# Patient Record
Sex: FEMALE | Race: WHITE | NOT HISPANIC OR LATINO | Employment: FULL TIME | URBAN - METROPOLITAN AREA
[De-identification: names, ages, dates, MRNs, and addresses within clinical notes are randomized per-mention and may not be internally consistent; named-entity substitution may affect disease eponyms.]

---

## 2018-01-05 ENCOUNTER — ALLSCRIPTS OFFICE VISIT (OUTPATIENT)
Dept: OTHER | Facility: OTHER | Age: 38
End: 2018-01-05

## 2018-01-05 DIAGNOSIS — E03.9 HYPOTHYROIDISM: ICD-10-CM

## 2018-01-06 NOTE — CONSULTS
Assessment   1  Thyromegaly (240 9) (E01 0)   2  Hypothyroidism (244 9) (E03 9)   3  Weight gain (783 1) (R63 5)   4  Prediabetes (790 29) (R73 03)    Plan   Hypothyroidism    · (1) IGF-1; Status:Active; Requested CWS:[id number]; Perform:[facility]; OBU:[id number];PZTOEHN;JXV:GXJYMYFZYOVAFV; Ordered By:Marin Montiel;   · (1) SALIVARY CORTISOL, THREE SPECIMENS; Status:Active; Requested CVE:[id number]; Perform:[facility]; TTN:[id number];ONSAMZB;YFT:WCKQUYPQNKEBYO; Ordered By:Guillaume Montiel;   · (1) T4, FREE; Status:Active; Requested RCR:[id number]; Perform:[facility]; [id number];WYTGVCU;ZFG:LQCDGIVFDRQWPL; Ordered By:Marin Montiel;   · (1) TSH; Status:Active; Requested HKQ:[id number]; Perform:[facility]; JCQ:80GFK0574;DYMBFQO;RMO:TFYZPHZJDISWHH; Ordered By:Marin Montiel;   · [facility]) Thyroid Peroxidase (TPO) Ab; Status:Active; Requested SSL:[id number]; Perform:[facility]; NDS:49ECW5181;XVHXRRO;ZAHIRA:THMCEIKBVNNZDS; Ordered By:Marin Montiel;   · US THYROID; Status:Hold For - Scheduling; Requested UYH:[id number]; Perform:[facility]; BGS:[id number];ZMDZFIK;GIV:KXMMHIDMGIMSRJ; Ordered By:Marin Montiel;   · Follow-up visit in 6 months Evaluation and Treatment  Follow-up  Status: Hold For -    Scheduling  Requested for: [id number]   Ordered; For: Hypothyroidism; Ordered By: Chuck Gerardo Performed:  Due: 50PYG4565    Discussion/Summary   Discussion Summary:    1  Thyromegaly: Symptomatic early he does not describe significant depressive symptoms  There were very small thyroid nodules described on her ultrasound report  The multiple tiny nodules bilaterally could be related to her history of Hashimoto's  Will check another ultrasound in  of this year  Hypothyroidism secondary to Hashimoto's: Check TSH free T4 and TPO antibodies today  Will adjust if needed  Patient inquired into brand thyroid hormone and if she wishes she can try Synthroid  Weight gain and history of prediabetes: Screen for Cushing's and acromegaly  If her thyroid tests of appear appropriate and her screens for Cushing's in acromegaly are negative, in the setting of prediabetes can consider medical treatment for weight loss such as saxenda  Also discussed possibility of meeting with the nutritionist versus the medical nutrition therapy weight loss program    in 6 months  Counseling Documentation With Imm: The patient was counseled regarding diagnostic results,-- impressions,-- importance of compliance with treatment  Medication SE Review and Pt Understands Tx: The treatment plan was reviewed with the patient/guardian  The patient/guardian understands and agrees with the treatment plan      Chief Complaint   Chief Complaint Free Text Note Form: consult      History of Present Illness   HPI: 42-year-old female who was found to have thyromegaly and Hashimoto's in January 2016 presents to establish care  She reports thyromegaly and thyroid dysfunction was found after pregnancy in the setting of uncontrolled weight gain  She was started on levothyroxine 50 mcg daily and takes this appropriately  She had a repeat ultrasound done in June of 2017 showed an increase in size of her thyroid as well as some small nodules  Overall, she notes she feels okay  She does report fatigue  One of her main concerns is difficult to control weight gain as well as difficulty with weight loss  She also has a recent diagnosis of prediabetes with an A1c of 5 7  She denies family history of thyroid cancer, but does note her mother had an overactive thyroid had her thyroid removed  She denies dysphagia, odynophagia, hoarseness at baseline  Sometimes when she is lying on her back she does report feeling some compressive symptoms        Review of Systems   Endo Adult ROS Female New Patient:      Constitutional/General: no change in ring size,-- change in shoe size,-- chills,-- dizziness,-- no fainting,-- fatigue,-- no fever,-- forgetfulness,-- headache,-- no loss of sleep,-- no recent weight loss,-- nervousness,-- no numbness,-- no temperature intolerance,-- no excessive sweating-- and-- weight gain  Muscle/Joint/Bone: back pain-- and-- hand weakness, but-- no arm pain,-- no hip pain,-- no leg pain,-- no foot pain,-- no neck pain,-- no hand pain,-- no shoulder pain,-- no arm weakness,-- no back weakness,-- no hip weakness,-- no leg weakness,-- no foot weakness,-- no neck weakness,-- no shoulder weakness,-- no arm numbness,-- no back numbness,-- no hip numbness,-- no leg numbness,-- no foot numbness,-- no neck numbness,-- no hand numbness-- and-- no shoulder numbness  Gastrointestinal: no constipation,-- no diarrhea,-- no excessive hunger,-- no excessive thirst,-- no nausea,-- no poor appetite,-- no rectal bleeding,-- no stomach pain,-- no vomiting-- and-- no vomiting blood  Cardiovascular: no chest pain,-- no hypertension,-- no irregular heart beat,-- no hypotension,-- no poor circulation,-- no rapid heart beat-- and-- no ankle swelling  Eye/Ear/Nose/Throat: bleeding gums, but-- no blurred vision,-- no difficulty swallowing,-- no double vision,-- no gritty eyes,-- no hoarseness,-- no hearing loss,-- no persistent cough,-- no sinus problems,-- not seeing flashes-- and-- not seeing halos  Skin: bruising easily-- and-- itching, but-- no hives,-- no change in a mole,-- no rashes,-- no scar-- and-- no slow healing sores  Genitourinary no blood in urine,-- no frequent urination,-- no night time urination-- and-- no painful urination  Genitourinary - Reproductive 1 Children, but-- normal period,-- no bleeding between periods,-- no breast lump,-- no hot flashes,-- no nipple discharge,-- no vaginal discharge-- and-- not pregnant  date of last menstruation is unknown  the interval of the LMP is unknown periods usually lasts an unspecified duration             LASHELL Reviewed:    LASHELL reviewed  Past Medical History   Active Problems And Past Medical History Reviewed: The active problems and past medical history were reviewed and updated today  Surgical History   Surgical History Reviewed: The surgical history was reviewed and updated today  Family History   Mother    1  Family history of malignant neoplasm (V16 9) (Z80 9)  Grandmother    2  Family history of malignant neoplasm (V16 9) (Z80 9)  Grandfather    3  Family history of malignant neoplasm (V16 9) (Z80 9)  Aunt    4  Family history of diabetes mellitus (V18 0) (Z83 3)  Uncle    5  Family history of diabetes mellitus (V18 0) (Z83 3)   6  Family history of hypertension (V17 49) (Z82 49)  Family History Reviewed: The family history was reviewed and updated today  Social History    · Current smoker on some days (305 1) (F17 200)  Social History Reviewed: The social history was reviewed and updated today  Current Meds    1  Levothyroxine Sodium 50 MCG Oral Tablet; Therapy: (Recorded:05Jan2018) to Recorded   2  Mirena IUD; Therapy: (Recorded:05Jan2018) to Recorded   3  Omeprazole 20 MG Oral Tablet Delayed Release; Therapy: (Recorded:05Jan2018) to Recorded   4  Sertraline HCl - 25 MG Oral Tablet; Therapy: (Recorded:05Jan2018) to Recorded  Medication List Reviewed: The medication list was reviewed and updated today  Allergies   1  No Known Drug Allergies    Vitals   Vital Signs    Recorded: 01YLB8284 08:08AM   Heart Rate 64   Systolic 986   Diastolic 74   Height 5 ft 1 42 in   Weight 285 lb 2 oz   BMI Calculated 53 14   BSA Calculated 2 21     Physical Exam        Constitutional      General appearance: No acute distress, well appearing and well nourished  Eyes      Conjunctiva and lids: No swelling, erythema, or discharge  Pupils: Equal, round and reactive to light  The sclera are anicteric  Extraocular movements are intact         Ears, Nose, Mouth, and Throat      Neck: The neck is supple  The thyroid is normal in size with no palpable nodules  Pulmonary      Respiratory effort: No increased work of breathing or signs of respiratory distress  Auscultation of lungs: Clear to auscultation bilaterally with normal chest expansion  Cardiovascular      Auscultation of heart: Normal rate and rhythm with no murmurs, gallops or rubs  Examination of extremities for edema and/or varicosities: Normal        Abdomen      Abdomen: Abdomen is soft, non-tender with normal bowel sounds  Lymphatic      Palpation of lymph nodes: No supraclavicular or suboccipital lymphadenopathy  Skin      Skin and subcutaneous tissue: Normal skin temperature and color  Psychiatric      Orientation to person, place and time: Normal        Mood and affect: Affect and attention span are normal        Results/Data   Office Record Review: Recent labs show an A1c of 5 7 and normal CMP  CBC normal, CMP normal, free T4 1 12, TSH 1 78    thyroid ultrasound: Thyroid gland is markedly nodular in appearance and inhomogeneous in echogenicity  Gland is markedly enlarged measuring 7 4 x 2 x 1 9 cm on the right knee 8 x 1 8 x 1 8 cm on the left  No focal masses could be measure although there are multiple tiny nodules bilaterally        Future Appointments      Date/Time Provider Specialty Site   07/05/2018 08:15 AM Zander Patino, [address] [facility] 939     Signatures    Electronically signed by : RAMONA Dhillon ; Jan 5 2018  9:07AM EST                       (Author)

## 2018-01-11 ENCOUNTER — GENERIC CONVERSION - ENCOUNTER (OUTPATIENT)
Dept: OTHER | Facility: OTHER | Age: 38
End: 2018-01-11

## 2018-01-12 ENCOUNTER — GENERIC CONVERSION - ENCOUNTER (OUTPATIENT)
Dept: OTHER | Facility: OTHER | Age: 38
End: 2018-01-12

## 2018-01-12 LAB
INSULIN-LIKE GROWTH FACTOR-1 (HISTORICAL): 103 NG/ML (ref 69–227)
T4 FREE SERPL-MCNC: 1.03 NG/DL (ref 0.82–1.77)
THYROID MICROSOMAL ANTIBODY (HISTORICAL): 111 IU/ML (ref 0–34)
TSH SERPL DL<=0.05 MIU/L-ACNC: 2.26 UIU/ML (ref 0.45–4.5)

## 2018-01-15 ENCOUNTER — GENERIC CONVERSION - ENCOUNTER (OUTPATIENT)
Dept: OTHER | Facility: OTHER | Age: 38
End: 2018-01-15

## 2018-01-20 LAB
Lab: 0.02 UG/DL
Lab: 0.03 UG/DL
Lab: 0.04 UG/DL

## 2018-01-22 ENCOUNTER — GENERIC CONVERSION - ENCOUNTER (OUTPATIENT)
Dept: OTHER | Facility: OTHER | Age: 38
End: 2018-01-22

## 2018-01-22 VITALS
SYSTOLIC BLOOD PRESSURE: 114 MMHG | DIASTOLIC BLOOD PRESSURE: 74 MMHG | HEART RATE: 64 BPM | WEIGHT: 285.13 LBS | HEIGHT: 61 IN | BODY MASS INDEX: 53.83 KG/M2

## 2018-01-23 ENCOUNTER — GENERIC CONVERSION - ENCOUNTER (OUTPATIENT)
Dept: OTHER | Facility: OTHER | Age: 38
End: 2018-01-23

## 2018-01-23 LAB
Lab: NORMAL
Lab: NORMAL
Lab: NORMAL UG/DL
QUESTION/PROBLEM (HISTORICAL): NORMAL

## 2018-01-23 NOTE — RESULT NOTES
Verified Results  (1) IGF-1 [id number] 12:11PM Candido Santiago     Test Name Result Flag Reference   Insulin-Like Growth Factor I 103 ng/mL

## 2018-01-24 NOTE — RESULT NOTES
Verified Results  (LC) Salivary Cortisol X3, Timed [id number] 12:00AM [facility]als     Test Name Result Flag Reference   #3 Salivary Cortisol TNP     Test not performed   #2 Salivary Cortisol TNP     Test not performed   #1 Salivary Cortisol NFSR ug/dL     No frozen specimen received  [facility]) Request Problem [id number] 12:00AM [employer] [facility]     Test Name Result Flag Reference   Request Problem NFSR     No frozen specimen received          TEST:  [id number]  Salivary Cortisol X3, Timed

## 2018-01-24 NOTE — RESULT NOTES
Verified Results  (LC) Salivary Cortisol X3, Timed [id number] 10:00PM Mika Waddell     Test Name Result Flag Reference   #3 Salivary Cortisol 0 034 ug/dL     Draw date/time: 01/14/18 - 10:00   #2 Salivary Cortisol 0 040 ug/dL     Draw date/time: 01/13/18 - 10:00   #1 Salivary Cortisol 0 016 ug/dL     Draw date/time: 01/12/18 - 10:00  Reference Range:  Children and Adults:  8:00a  m :   0 025 - 0 600  Noon:      <0 010 - 0 330  4:00p m :   0 010 - 0 200  Midnight:  <0 010 - 0 090

## 2018-02-26 NOTE — RESULT NOTES
Verified Results  (1) TSH [id number] 12:11PM [facility]     Test Name Result Flag Reference   TSH 2 260 uIU/mL  0 450-4 500     [facility]) Thyroxine (T4) Free, Direct, S [id number] 12:11PM [facility]     Test Name Result Flag Reference   T4,Free(Direct) 1 03 ng/dL  0 82-1 77     (LC) Thyroid Peroxidase (TPO) Ab [id number] 12:09PM [facility]     Test Name Result Flag Reference   Thyroid Peroxidase (TPO) Ab 111 IU/mL H 0-34

## 2018-03-05 DIAGNOSIS — E03.9 HYPOTHYROIDISM: ICD-10-CM

## 2018-05-18 LAB
T4 FREE SERPL-MCNC: 1.02 NG/DL (ref 0.82–1.77)
TSH SERPL DL<=0.005 MIU/L-ACNC: 2.23 UIU/ML (ref 0.45–4.5)

## 2018-06-01 DIAGNOSIS — E03.9 HYPOTHYROIDISM: ICD-10-CM

## 2018-06-29 LAB — HBA1C MFR BLD HPLC: 5.8 %

## 2018-07-02 RX ORDER — LEVOTHYROXINE SODIUM 0.05 MG/1
TABLET ORAL
Refills: 5 | COMMUNITY
Start: 2018-05-23 | End: 2018-07-05 | Stop reason: SDUPTHER

## 2018-07-02 RX ORDER — OMEPRAZOLE 20 MG/1
20 CAPSULE, DELAYED RELEASE ORAL DAILY
Refills: 3 | COMMUNITY
Start: 2018-04-25 | End: 2022-04-18

## 2018-07-02 RX ORDER — SERTRALINE HYDROCHLORIDE 25 MG/1
25 TABLET, FILM COATED ORAL DAILY
Refills: 1 | COMMUNITY
Start: 2018-05-23

## 2018-07-05 ENCOUNTER — OFFICE VISIT (OUTPATIENT)
Dept: ENDOCRINOLOGY | Facility: HOSPITAL | Age: 38
End: 2018-07-05
Payer: COMMERCIAL

## 2018-07-05 VITALS
DIASTOLIC BLOOD PRESSURE: 68 MMHG | HEIGHT: 61 IN | BODY MASS INDEX: 55.32 KG/M2 | SYSTOLIC BLOOD PRESSURE: 126 MMHG | HEART RATE: 68 BPM | WEIGHT: 293 LBS

## 2018-07-05 DIAGNOSIS — R73.03 PRE-DIABETES: ICD-10-CM

## 2018-07-05 DIAGNOSIS — IMO0001 CLASS 3 OBESITY WITHOUT SERIOUS COMORBIDITY WITH BODY MASS INDEX (BMI) OF 50.0 TO 59.9 IN ADULT, UNSPECIFIED OBESITY TYPE: ICD-10-CM

## 2018-07-05 DIAGNOSIS — E06.3 HYPOTHYROIDISM DUE TO HASHIMOTO'S THYROIDITIS: Primary | ICD-10-CM

## 2018-07-05 DIAGNOSIS — E03.8 HYPOTHYROIDISM DUE TO HASHIMOTO'S THYROIDITIS: Primary | ICD-10-CM

## 2018-07-05 PROCEDURE — 99214 OFFICE O/P EST MOD 30 MIN: CPT | Performed by: NURSE PRACTITIONER

## 2018-07-05 RX ORDER — LEVOTHYROXINE SODIUM 0.05 MG/1
TABLET ORAL
Qty: 34 TABLET | Refills: 6 | Status: SHIPPED | OUTPATIENT
Start: 2018-07-05 | End: 2019-01-09 | Stop reason: SDUPTHER

## 2018-07-05 NOTE — PATIENT INSTRUCTIONS
Be mindful of diet  Exercise regularly  Stay hydrated  Continue levothyroxine at current dose  Consider follow up with Nutritionist and HNT program     Can discuss medications such as Qsymia, Contrave, Belviq and Saxenda

## 2018-07-05 NOTE — PROGRESS NOTES
Jamesonrandal Co 43 y o  female MRN: [id number]    Encounter: [id number]      Assessment/Plan     Assessment: This is a 43y o -year-old female with hypothyroidism, thyromegaly and weight gain/prediabetes  Plan:  1  Thyromegaly:  She denies any neck compressive symptoms at this time  Her most recent ultrasound of the thyroid shows a heterogeneous thyroid with no discrete nodules      2  Hypothyroidism secondary to Hashimoto's:  Her most recent TSH and free T4 were normal  Overall, she feels well  Continue levothyroxine at current dose of 1 tablet Monday through Saturday and 2 tablets on Sunday  Check thyroid function lab work prior to next visit      3  Weight gain and history of prediabetes:  Discussed the importance of diet and regular exercise  She will follow up with medical nutrition therapy and discuss options with the director of the HNT program and of our [facility] office  Briefly reviewed Qsymia, Belviq, Contrave and Saxenda as possible pharmacological therapies  CC:  Hypothyroidism follow-up    History of Present Illness     HPI:  26-year-old female who was found to have thyromegaly and Hashimoto's in January 2016 presents to establish care  She reports thyromegaly and thyroid dysfunction was found after pregnancy in the setting of uncontrolled weight gain  She takes levothyroxine 50 mcg daily Monday through Saturday with 2 tablets on Sunday consistently and appropriately  Her most recent TSH from May 17, 2018 was 2 230 with a free T4 of 1 02  Her most recent thyroid ultrasound from June 5, 2018 shows a grossly stable thyroid heterogeneous in nature with no discrete nodules  Overall, she notes she feels okay  She does report fatigue  One of her main concerns is difficult to control weight gain as well as difficulty with weight loss  She also has a recent diagnosis of prediabetes with an A1c of 5 7   She denies family history of thyroid cancer, but does note her mother had an overactive thyroid had her thyroid removed  She denies dysphagia, odynophagia, hoarseness at baseline  Sometimes when she is lying on her back she does report feeling some compressive symptoms  Review of Systems   Constitutional: Positive for fatigue (at times)  Negative for chills and fever  HENT: Negative  Eyes: Negative  Respiratory: Negative  Negative for chest tightness and shortness of breath  Cardiovascular: Negative  Negative for chest pain and palpitations  Gastrointestinal: Negative  Negative for abdominal pain, constipation, diarrhea and vomiting  Endocrine: Negative for cold intolerance, heat intolerance, polydipsia, polyphagia and polyuria  Genitourinary: Negative  Musculoskeletal: Negative  Skin: Negative  Allergic/Immunologic: Negative  Neurological: Negative  Negative for dizziness, syncope, light-headedness and headaches  Hematological: Negative  Psychiatric/Behavioral: Negative  All other systems reviewed and are negative  Historical Information   No past medical history on file  No past surgical history on file    Social History   History   Alcohol use Not on file     History   Drug use: Unknown     History   Smoking Status    Former Smoker    Packs/day: 0 25    Years: 10 00    Types: Cigarettes    Quit date: 7/5/2013   Smokeless Tobacco    Never Used     Family History:   Family History   Problem Relation Age of Onset    Hypothyroidism Mother     Lung cancer Mother     Hyperlipidemia Father     Diabetes type II Maternal Aunt     Diabetes type II Maternal Uncle     Diabetes type II Paternal Uncle     Diabetes type II Paternal Grandmother        Meds/Allergies   Current Outpatient Prescriptions   Medication Sig Dispense Refill    levonorgestrel (MIRENA, 52 MG,) 20 MCG/24HR IUD by Intrauterine route      levothyroxine 50 mcg tablet TAKE 1 TABLET BY MOUTH ONCE A DAY IN THE MORNING ON AN EMPTY STOMACH  2 TABLETS ON SUNDAY  5    omeprazole (PriLOSEC) 20 mg delayed release capsule Take 20 mg by mouth daily  3    sertraline (ZOLOFT) 25 mg tablet Take 25 mg by mouth daily  1     No current facility-administered medications for this visit  No Known Allergies    Objective   Vitals: Pulse 68, height 5' 1" (1 549 m), weight 134 kg (295 lb 3 2 oz)  Physical Exam   Constitutional: She is oriented to person, place, and time  She appears well-developed and well-nourished  No distress  Morbidly obese   HENT:   Head: Normocephalic and atraumatic  Mouth/Throat: Oropharynx is clear and moist    Eyes: Conjunctivae and EOM are normal  Pupils are equal, round, and reactive to light  Right eye exhibits no discharge  Left eye exhibits no discharge  Wears glasses   Neck: Normal range of motion  Neck supple  No JVD present  No tracheal deviation present  Thyromegaly (slight) present  Cardiovascular: Normal rate, regular rhythm, normal heart sounds and intact distal pulses  Pulmonary/Chest: Effort normal and breath sounds normal  No stridor  No respiratory distress  She has no wheezes  She has no rales  She exhibits no tenderness  Abdominal: Soft  Bowel sounds are normal  She exhibits no distension  There is no tenderness  Musculoskeletal: Normal range of motion  She exhibits no edema, tenderness or deformity  Lymphadenopathy:     She has no cervical adenopathy  Neurological: She is alert and oriented to person, place, and time  She displays normal reflexes  No cranial nerve deficit  Coordination normal    Skin: Skin is warm and dry  No rash noted  No erythema  No pallor  Psychiatric: She has a normal mood and affect  Her behavior is normal  Judgment and thought content normal    Vitals reviewed        Lab Results:   Lab Results   Component Value Date/Time    [facility] 01/11/2018 12:11 PM    Free T4 1 03 01/11/2018 12:11 PM    Free t4 1 02 05/17/2018 12:07 PM       Imaging Studies:   Results for orders placed in visit on 06/05/18 US thyroid     Portions of the record may have been created with voice recognition software  Occasional wrong word or "sound a like" substitutions may have occurred due to the inherent limitations of voice recognition software  Read the chart carefully and recognize, using context, where substitutions have occurred

## 2018-09-29 LAB
ALBUMIN SERPL-MCNC: 4.2 G/DL (ref 3.5–5.5)
ALBUMIN/GLOB SERPL: 1.8 {RATIO} (ref 1.2–2.2)
ALP SERPL-CCNC: 64 IU/L (ref 39–117)
ALT SERPL-CCNC: 17 IU/L (ref 0–32)
AST SERPL-CCNC: 19 IU/L (ref 0–40)
BILIRUB SERPL-MCNC: 0.3 MG/DL (ref 0–1.2)
BUN SERPL-MCNC: 13 MG/DL (ref 6–20)
BUN/CREAT SERPL: 19 (ref 9–23)
CALCIUM SERPL-MCNC: 9.1 MG/DL (ref 8.7–10.2)
CHLORIDE SERPL-SCNC: 103 MMOL/L (ref 96–106)
CO2 SERPL-SCNC: 22 MMOL/L (ref 20–29)
CREAT SERPL-MCNC: 0.7 MG/DL (ref 0.57–1)
EST. AVERAGE GLUCOSE BLD GHB EST-MCNC: 123 MG/DL
GLOBULIN SER-MCNC: 2.4 G/DL (ref 1.5–4.5)
GLUCOSE SERPL-MCNC: 107 MG/DL (ref 65–99)
HBA1C MFR BLD: 5.9 % (ref 4.8–5.6)
POTASSIUM SERPL-SCNC: 4.4 MMOL/L (ref 3.5–5.2)
PROT SERPL-MCNC: 6.6 G/DL (ref 6–8.5)
SL AMB EGFR AFRICAN AMERICAN: 126 ML/MIN/1.73
SL AMB EGFR NON AFRICAN AMERICAN: 109 ML/MIN/1.73
SODIUM SERPL-SCNC: 138 MMOL/L (ref 134–144)
T4 FREE SERPL-MCNC: 1.02 NG/DL (ref 0.82–1.77)
TSH SERPL DL<=0.005 MIU/L-ACNC: 2.95 UIU/ML (ref 0.45–4.5)

## 2018-10-02 ENCOUNTER — TELEPHONE (OUTPATIENT)
Dept: ENDOCRINOLOGY | Facility: HOSPITAL | Age: 38
End: 2018-10-02

## 2018-10-02 NOTE — TELEPHONE ENCOUNTER
----- Message from Serene Eisenberg, [address] sent at 10/2/2018  4:34 AM EDT -----  Please call the patient regarding abnormal result  TSH and free T4 are normal  Hemoglobin A1c is 5 9  Fasting glucose is elevated on comprehensive metabolic panel

## 2018-10-02 NOTE — PROGRESS NOTES
Please call the patient regarding abnormal result  TSH and free T4 are normal  Hemoglobin A1c is 5 9  Fasting glucose is elevated on comprehensive metabolic panel

## 2018-10-31 ENCOUNTER — OFFICE VISIT (OUTPATIENT)
Dept: ENDOCRINOLOGY | Facility: HOSPITAL | Age: 38
End: 2018-10-31
Payer: COMMERCIAL

## 2018-10-31 VITALS
SYSTOLIC BLOOD PRESSURE: 124 MMHG | HEART RATE: 74 BPM | HEIGHT: 61 IN | DIASTOLIC BLOOD PRESSURE: 70 MMHG | BODY MASS INDEX: 55.32 KG/M2 | WEIGHT: 293 LBS

## 2018-10-31 DIAGNOSIS — E66.01 CLASS 3 SEVERE OBESITY DUE TO EXCESS CALORIES WITHOUT SERIOUS COMORBIDITY WITH BODY MASS INDEX (BMI) OF 50.0 TO 59.9 IN ADULT (HCC): ICD-10-CM

## 2018-10-31 DIAGNOSIS — E03.8 HYPOTHYROIDISM DUE TO HASHIMOTO'S THYROIDITIS: Primary | ICD-10-CM

## 2018-10-31 DIAGNOSIS — E06.3 HYPOTHYROIDISM DUE TO HASHIMOTO'S THYROIDITIS: Primary | ICD-10-CM

## 2018-10-31 DIAGNOSIS — R73.03 PRE-DIABETES: ICD-10-CM

## 2018-10-31 PROCEDURE — 99214 OFFICE O/P EST MOD 30 MIN: CPT | Performed by: NURSE PRACTITIONER

## 2018-10-31 NOTE — PROGRESS NOTES
Randol Scale 43 y o  female MRN: [id number]    Encounter: [id number]      Assessment/Plan     Assessment: This is a 43y o -year-old female with hypothyroidism due to Hashimoto's thyroiditis, pre diabetes and obesity  Plan:  1  Thyromegaly:  She denies any neck compressive symptoms at this time  Her most recent ultrasound of the thyroid shows a heterogeneous thyroid with no discrete nodules      2  Hypothyroidism secondary to Hashimoto's thyroiditis:  She complains of some fatigue  Her most recent TSH is 2 9  I have asked her to increase her dose of levothyroxine slightly to 2 tablets on Saturday and Sunday and continue 50 mcg of levothyroxine Monday through Friday  Recheck TSH and free T4 in 6 weeks to reassess  Further titration of her levothyroxine dose may take place upon review of updated lab work  Goal for her TSH is between 1 0 and 2 0      3  Weight gain and history of prediabetes:  Discussed the importance of diet and regular exercise  She is having difficulty with her insurance and finding a nutritionist   She also is having difficulty with her insurance and procuring Saxenda  She will forward insurance documentation to the office for review  CC:   Hypothyroidism follow-up    History of Present Illness     HPI:  26-year-old female who was found to have thyromegaly and Hashimoto's in January 2016 presents to establish care  She reports thyromegaly and thyroid dysfunction was found after pregnancy in the setting of uncontrolled weight gain  She takes levothyroxine 50 mcg daily Monday through Saturday with 2 tablets on Sunday consistently and appropriately  Her most recent TSH from September 28, 2018 was 2 950 with a free T4 of 1 02  Her most recent thyroid ultrasound from June 5, 2018 shows a grossly stable thyroid heterogeneous in nature with no discrete nodules  Overall, she notes she feels okay  She does report fatigue   One of her main concerns is difficult to control weight gain as well as difficulty with weight loss  She also had diagnosis of prediabetes earlier this year with a recent hemoglobin A1c of 5 9  She denies family history of thyroid cancer, but does note her mother had an overactive thyroid had her thyroid removed  She denies any anterior neck discomfort, dysphagia or dysphonia      Review of Systems   Constitutional: Positive for fatigue (At times)  Negative for chills and fever  HENT: Negative  Negative for trouble swallowing and voice change  Eyes: Negative  Respiratory: Negative  Negative for chest tightness and shortness of breath  Cardiovascular: Negative  Negative for chest pain  Gastrointestinal: Negative  Negative for abdominal pain, constipation, diarrhea and vomiting  Endocrine: Negative for cold intolerance, heat intolerance, polydipsia, polyphagia and polyuria  Genitourinary: Negative  Musculoskeletal: Negative  Skin: Negative  Allergic/Immunologic: Negative  Neurological: Negative  Negative for dizziness, syncope, light-headedness and headaches  Hematological: Negative  Psychiatric/Behavioral: Negative  All other systems reviewed and are negative  Historical Information   No past medical history on file  No past surgical history on file    Social History   History   Alcohol use Not on file     History   Drug use: Unknown     History   Smoking Status    Former Smoker    Packs/day: 0 25    Years: 10 00    Types: Cigarettes    Quit date: 7/5/2013   Smokeless Tobacco    Never Used     Family History:   Family History   Problem Relation Age of Onset    Hypothyroidism Mother     Lung cancer Mother     Hyperlipidemia Father     Diabetes type II Maternal Aunt     Diabetes type II Maternal Uncle     Diabetes type II Paternal Uncle     Diabetes type II Paternal Grandmother        Meds/Allergies   Current Outpatient Prescriptions   Medication Sig Dispense Refill    levonorgestrel (MIRENA, 52 MG,) 20 MCG/24HR IUD by Intrauterine route      levothyroxine 50 mcg tablet Take one tablet in the morning on an empty stomach and 2 tablets on Sunday  34 tablet 6    omeprazole (PriLOSEC) 20 mg delayed release capsule Take 20 mg by mouth daily  3    sertraline (ZOLOFT) 25 mg tablet Take 25 mg by mouth daily  1     No current facility-administered medications for this visit  No Known Allergies    Objective   Vitals: Blood pressure 124/70, pulse 74, height 5' 1" (1 549 m), weight 135 kg (296 lb 12 8 oz)  Physical Exam   Constitutional: She is oriented to person, place, and time  She appears well-developed and well-nourished  No distress  Morbidly obese   HENT:   Head: Normocephalic and atraumatic  Mouth/Throat: Oropharynx is clear and moist    Eyes: Pupils are equal, round, and reactive to light  Conjunctivae and EOM are normal  Right eye exhibits no discharge  Left eye exhibits no discharge  No scleral icterus  Wears glasses   Neck: Normal range of motion  Neck supple  No JVD present  No tracheal deviation present  No thyromegaly present  Cardiovascular: Normal rate, regular rhythm, normal heart sounds and intact distal pulses  Exam reveals no gallop and no friction rub  No murmur heard  Pulmonary/Chest: Effort normal and breath sounds normal  No stridor  No respiratory distress  She has no wheezes  She has no rales  She exhibits no tenderness  Abdominal: Soft  Bowel sounds are normal  She exhibits no distension  There is no tenderness  Musculoskeletal: Normal range of motion  She exhibits no edema, tenderness or deformity  Lymphadenopathy:     She has no cervical adenopathy  Neurological: She is alert and oriented to person, place, and time  She displays normal reflexes  No cranial nerve deficit  She exhibits normal muscle tone  Coordination normal    Skin: Skin is warm and dry  No rash noted  No erythema  No pallor  Dry skin   Psychiatric: She has a normal mood and affect   Her behavior is normal  Judgment and thought content normal    Vitals reviewed  Lab Results:   Lab Results   Component Value Date/Time    TSH 3RD GENERATON 2 260 01/11/2018 12:11 PM    Free T4 1 03 01/11/2018 12:11 PM    Free t4 1 02 09/28/2018 07:37 AM    Free t4 1 02 05/17/2018 12:07 PM       Imaging Studies:   Results for orders placed in visit on 06/05/18   US thyroid       Portions of the record may have been created with voice recognition software  Occasional wrong word or "sound a like" substitutions may have occurred due to the inherent limitations of voice recognition software  Read the chart carefully and recognize, using context, where substitutions have occurred

## 2018-10-31 NOTE — PATIENT INSTRUCTIONS
Be mindful of diet  Exercise regularly  Stay hydrated  Increase her dose of levothyroxine to 2 tablets on Saturday and Sunday with 1 tablet Monday through Friday  Obtain thyroid function lab work in 6 weeks to reassess  Your levothyroxine dose may change upon review of updated lab work  Follow up with Nutritionist and HNT program, as discussed  Send us your insurance paperwork for PAYAL

## 2018-11-01 DIAGNOSIS — E66.01 MORBID OBESITY (HCC): Primary | ICD-10-CM

## 2018-11-01 NOTE — TELEPHONE ENCOUNTER
Pt needs us to send her Saxenda to [facility] so they can run the claim  Then we can try the Prior Auth to get it covered  I set up the refill but please double check it

## 2018-11-05 ENCOUNTER — TELEPHONE (OUTPATIENT)
Dept: ENDOCRINOLOGY | Facility: HOSPITAL | Age: 38
End: 2018-11-05

## 2018-11-05 NOTE — TELEPHONE ENCOUNTER
I did the prior auth for the Saxenda was denied  I'm going to try to do the appeal but I don't know if that will work

## 2018-11-08 NOTE — TELEPHONE ENCOUNTER
We can try the appeal but it does not appear that she has any cardiovascular problems that would preclude her from utilizing a stimulant medication  I will get to the paperwork today    Thank you for your help

## 2018-12-20 ENCOUNTER — TELEPHONE (OUTPATIENT)
Dept: ENDOCRINOLOGY | Facility: HOSPITAL | Age: 38
End: 2018-12-20

## 2019-01-09 DIAGNOSIS — E03.8 HYPOTHYROIDISM DUE TO HASHIMOTO'S THYROIDITIS: ICD-10-CM

## 2019-01-09 DIAGNOSIS — E06.3 HYPOTHYROIDISM DUE TO HASHIMOTO'S THYROIDITIS: ICD-10-CM

## 2019-01-09 RX ORDER — LEVOTHYROXINE SODIUM 0.05 MG/1
TABLET ORAL
Qty: 36 TABLET | Refills: 5 | Status: SHIPPED | OUTPATIENT
Start: 2019-01-09 | End: 2019-02-13 | Stop reason: SDUPTHER

## 2019-02-13 DIAGNOSIS — E03.8 HYPOTHYROIDISM DUE TO HASHIMOTO'S THYROIDITIS: ICD-10-CM

## 2019-02-13 DIAGNOSIS — E03.8 HYPOTHYROIDISM DUE TO HASHIMOTO'S THYROIDITIS: Primary | ICD-10-CM

## 2019-02-13 DIAGNOSIS — E06.3 HYPOTHYROIDISM DUE TO HASHIMOTO'S THYROIDITIS: Primary | ICD-10-CM

## 2019-02-13 DIAGNOSIS — E06.3 HYPOTHYROIDISM DUE TO HASHIMOTO'S THYROIDITIS: ICD-10-CM

## 2019-02-13 LAB
ALBUMIN SERPL-MCNC: 4.3 G/DL (ref 3.5–5.5)
ALBUMIN/GLOB SERPL: 1.7 {RATIO} (ref 1.2–2.2)
ALP SERPL-CCNC: 68 IU/L (ref 39–117)
ALT SERPL-CCNC: 16 IU/L (ref 0–32)
AST SERPL-CCNC: 17 IU/L (ref 0–40)
BILIRUB SERPL-MCNC: 0.3 MG/DL (ref 0–1.2)
BUN SERPL-MCNC: 14 MG/DL (ref 6–20)
BUN/CREAT SERPL: 21 (ref 9–23)
CALCIUM SERPL-MCNC: 9.4 MG/DL (ref 8.7–10.2)
CHLORIDE SERPL-SCNC: 102 MMOL/L (ref 96–106)
CO2 SERPL-SCNC: 24 MMOL/L (ref 20–29)
CREAT SERPL-MCNC: 0.67 MG/DL (ref 0.57–1)
EST. AVERAGE GLUCOSE BLD GHB EST-MCNC: 126 MG/DL
GLOBULIN SER-MCNC: 2.6 G/DL (ref 1.5–4.5)
GLUCOSE SERPL-MCNC: 106 MG/DL (ref 65–99)
HBA1C MFR BLD: 6 % (ref 4.8–5.6)
POTASSIUM SERPL-SCNC: 4.5 MMOL/L (ref 3.5–5.2)
PROT SERPL-MCNC: 6.9 G/DL (ref 6–8.5)
SL AMB EGFR AFRICAN AMERICAN: 128 ML/MIN/1.73
SL AMB EGFR NON AFRICAN AMERICAN: 111 ML/MIN/1.73
SODIUM SERPL-SCNC: 141 MMOL/L (ref 134–144)
T4 FREE SERPL-MCNC: 0.98 NG/DL (ref 0.82–1.77)
TSH SERPL DL<=0.005 MIU/L-ACNC: 4.27 UIU/ML (ref 0.45–4.5)

## 2019-02-13 RX ORDER — LEVOTHYROXINE SODIUM 0.05 MG/1
TABLET ORAL
Qty: 40 TABLET | Refills: 5 | Status: SHIPPED | OUTPATIENT
Start: 2019-02-13 | End: 2019-07-19 | Stop reason: SDUPTHER

## 2019-02-13 NOTE — RESULT ENCOUNTER NOTE
Please call the patient regarding abnormal result  Hemoglobin A1c is stable at 6 0   TSH is in the high normal range at 4 2 with a normal free T4  I would suggest taking levothyroxine 50 mcg Monday through Thursday and 2 tablets on Friday, Saturday and Sunday  Recheck TSH and free T4 in 4 weeks just prior to next office visit  Goal for TSH is between 1 0 and 2  0

## 2019-03-13 LAB
T4 FREE SERPL-MCNC: 0.97 NG/DL (ref 0.82–1.77)
TSH SERPL DL<=0.005 MIU/L-ACNC: 2.23 UIU/ML (ref 0.45–4.5)

## 2019-03-28 ENCOUNTER — TELEPHONE (OUTPATIENT)
Dept: ENDOCRINOLOGY | Facility: HOSPITAL | Age: 39
End: 2019-03-28

## 2019-03-28 DIAGNOSIS — E03.8 HYPOTHYROIDISM DUE TO HASHIMOTO'S THYROIDITIS: Primary | ICD-10-CM

## 2019-03-28 DIAGNOSIS — E06.3 HYPOTHYROIDISM DUE TO HASHIMOTO'S THYROIDITIS: Primary | ICD-10-CM

## 2019-03-28 NOTE — TELEPHONE ENCOUNTER
Patient is being rescheduled 5 weeks out  Can you look at labs that were done and see if there is anything you'd like done before may

## 2019-05-08 LAB
ALBUMIN SERPL-MCNC: 4.4 G/DL (ref 3.5–5.5)
ALBUMIN/GLOB SERPL: 1.7 {RATIO} (ref 1.2–2.2)
ALP SERPL-CCNC: 66 IU/L (ref 39–117)
ALT SERPL-CCNC: 17 IU/L (ref 0–32)
AST SERPL-CCNC: 18 IU/L (ref 0–40)
BILIRUB SERPL-MCNC: 0.2 MG/DL (ref 0–1.2)
BUN SERPL-MCNC: 13 MG/DL (ref 6–24)
BUN/CREAT SERPL: 21 (ref 9–23)
CALCIUM SERPL-MCNC: 9.4 MG/DL (ref 8.7–10.2)
CHLORIDE SERPL-SCNC: 101 MMOL/L (ref 96–106)
CO2 SERPL-SCNC: 24 MMOL/L (ref 20–29)
CREAT SERPL-MCNC: 0.61 MG/DL (ref 0.57–1)
GLOBULIN SER-MCNC: 2.6 G/DL (ref 1.5–4.5)
GLUCOSE SERPL-MCNC: 90 MG/DL (ref 65–99)
POTASSIUM SERPL-SCNC: 4.6 MMOL/L (ref 3.5–5.2)
PROT SERPL-MCNC: 7 G/DL (ref 6–8.5)
SL AMB EGFR AFRICAN AMERICAN: 131 ML/MIN/1.73
SL AMB EGFR NON AFRICAN AMERICAN: 114 ML/MIN/1.73
SODIUM SERPL-SCNC: 139 MMOL/L (ref 134–144)
T4 FREE SERPL-MCNC: 1.12 NG/DL (ref 0.82–1.77)
TSH SERPL DL<=0.005 MIU/L-ACNC: 1.44 UIU/ML (ref 0.45–4.5)

## 2019-05-10 ENCOUNTER — OFFICE VISIT (OUTPATIENT)
Dept: ENDOCRINOLOGY | Facility: HOSPITAL | Age: 39
End: 2019-05-10
Payer: COMMERCIAL

## 2019-05-10 VITALS
BODY MASS INDEX: 55.32 KG/M2 | SYSTOLIC BLOOD PRESSURE: 118 MMHG | WEIGHT: 293 LBS | HEART RATE: 70 BPM | DIASTOLIC BLOOD PRESSURE: 80 MMHG | HEIGHT: 61 IN

## 2019-05-10 DIAGNOSIS — E06.3 HYPOTHYROIDISM DUE TO HASHIMOTO'S THYROIDITIS: Primary | ICD-10-CM

## 2019-05-10 DIAGNOSIS — E03.8 HYPOTHYROIDISM DUE TO HASHIMOTO'S THYROIDITIS: Primary | ICD-10-CM

## 2019-05-10 DIAGNOSIS — E66.01 CLASS 3 SEVERE OBESITY DUE TO EXCESS CALORIES WITHOUT SERIOUS COMORBIDITY WITH BODY MASS INDEX (BMI) OF 50.0 TO 59.9 IN ADULT (HCC): ICD-10-CM

## 2019-05-10 DIAGNOSIS — R73.03 PRE-DIABETES: ICD-10-CM

## 2019-05-10 PROCEDURE — 99214 OFFICE O/P EST MOD 30 MIN: CPT | Performed by: NURSE PRACTITIONER

## 2019-07-19 DIAGNOSIS — E03.8 HYPOTHYROIDISM DUE TO HASHIMOTO'S THYROIDITIS: ICD-10-CM

## 2019-07-19 DIAGNOSIS — E06.3 HYPOTHYROIDISM DUE TO HASHIMOTO'S THYROIDITIS: ICD-10-CM

## 2019-07-19 RX ORDER — LEVOTHYROXINE SODIUM 0.05 MG/1
TABLET ORAL
Qty: 40 TABLET | Refills: 5 | Status: SHIPPED | OUTPATIENT
Start: 2019-07-19 | End: 2020-01-13

## 2019-07-19 NOTE — TELEPHONE ENCOUNTER
Pt needs refill of her Levothyroxine please  She saw you last in May and has a follow up in September

## 2019-10-24 LAB
ALBUMIN SERPL-MCNC: 4.4 G/DL (ref 3.5–5.5)
ALBUMIN/GLOB SERPL: 1.8 {RATIO} (ref 1.2–2.2)
ALP SERPL-CCNC: 66 IU/L (ref 39–117)
ALT SERPL-CCNC: 14 IU/L (ref 0–32)
AST SERPL-CCNC: 16 IU/L (ref 0–40)
BILIRUB SERPL-MCNC: 0.3 MG/DL (ref 0–1.2)
BUN SERPL-MCNC: 13 MG/DL (ref 6–24)
BUN/CREAT SERPL: 18 (ref 9–23)
CALCIUM SERPL-MCNC: 10 MG/DL (ref 8.7–10.2)
CHLORIDE SERPL-SCNC: 100 MMOL/L (ref 96–106)
CO2 SERPL-SCNC: 25 MMOL/L (ref 20–29)
CREAT SERPL-MCNC: 0.73 MG/DL (ref 0.57–1)
EST. AVERAGE GLUCOSE BLD GHB EST-MCNC: 123 MG/DL
GLOBULIN SER-MCNC: 2.5 G/DL (ref 1.5–4.5)
GLUCOSE SERPL-MCNC: 101 MG/DL (ref 65–99)
HBA1C MFR BLD: 5.9 % (ref 4.8–5.6)
POTASSIUM SERPL-SCNC: 4.5 MMOL/L (ref 3.5–5.2)
PROT SERPL-MCNC: 6.9 G/DL (ref 6–8.5)
SL AMB EGFR AFRICAN AMERICAN: 119 ML/MIN/1.73
SL AMB EGFR NON AFRICAN AMERICAN: 103 ML/MIN/1.73
SODIUM SERPL-SCNC: 139 MMOL/L (ref 134–144)
T4 FREE SERPL-MCNC: 1.07 NG/DL (ref 0.82–1.77)
TSH SERPL DL<=0.005 MIU/L-ACNC: 1.66 UIU/ML (ref 0.45–4.5)

## 2020-01-10 DIAGNOSIS — E06.3 HYPOTHYROIDISM DUE TO HASHIMOTO'S THYROIDITIS: ICD-10-CM

## 2020-01-10 DIAGNOSIS — E03.8 HYPOTHYROIDISM DUE TO HASHIMOTO'S THYROIDITIS: ICD-10-CM

## 2020-01-14 RX ORDER — LEVOTHYROXINE SODIUM 0.05 MG/1
TABLET ORAL
Qty: 40 TABLET | Refills: 5 | Status: SHIPPED | OUTPATIENT
Start: 2020-01-14 | End: 2020-06-10 | Stop reason: SDUPTHER

## 2020-05-27 DIAGNOSIS — E06.3 HYPOTHYROIDISM DUE TO HASHIMOTO'S THYROIDITIS: ICD-10-CM

## 2020-05-27 DIAGNOSIS — E03.8 HYPOTHYROIDISM DUE TO HASHIMOTO'S THYROIDITIS: ICD-10-CM

## 2020-05-27 RX ORDER — LEVOTHYROXINE SODIUM 0.05 MG/1
TABLET ORAL
Qty: 40 TABLET | Refills: 5 | OUTPATIENT
Start: 2020-05-27

## 2020-05-29 ENCOUNTER — TELEPHONE (OUTPATIENT)
Dept: ENDOCRINOLOGY | Facility: HOSPITAL | Age: 40
End: 2020-05-29

## 2020-06-02 DIAGNOSIS — E03.8 HYPOTHYROIDISM DUE TO HASHIMOTO'S THYROIDITIS: ICD-10-CM

## 2020-06-02 DIAGNOSIS — E06.3 HYPOTHYROIDISM DUE TO HASHIMOTO'S THYROIDITIS: ICD-10-CM

## 2020-06-02 DIAGNOSIS — R73.03 PRE-DIABETES: Primary | ICD-10-CM

## 2020-06-10 ENCOUNTER — TELEMEDICINE (OUTPATIENT)
Dept: ENDOCRINOLOGY | Facility: HOSPITAL | Age: 40
End: 2020-06-10
Payer: COMMERCIAL

## 2020-06-10 ENCOUNTER — TELEPHONE (OUTPATIENT)
Dept: ENDOCRINOLOGY | Facility: HOSPITAL | Age: 40
End: 2020-06-10

## 2020-06-10 DIAGNOSIS — E06.3 HYPOTHYROIDISM DUE TO HASHIMOTO'S THYROIDITIS: ICD-10-CM

## 2020-06-10 DIAGNOSIS — R73.03 PRE-DIABETES: Primary | ICD-10-CM

## 2020-06-10 DIAGNOSIS — E66.01 CLASS 3 SEVERE OBESITY DUE TO EXCESS CALORIES WITHOUT SERIOUS COMORBIDITY WITH BODY MASS INDEX (BMI) OF 50.0 TO 59.9 IN ADULT (HCC): ICD-10-CM

## 2020-06-10 DIAGNOSIS — E03.8 HYPOTHYROIDISM DUE TO HASHIMOTO'S THYROIDITIS: ICD-10-CM

## 2020-06-10 LAB
ALBUMIN SERPL-MCNC: 4.2 G/DL (ref 3.8–4.8)
ALBUMIN/GLOB SERPL: 1.7 {RATIO} (ref 1.2–2.2)
ALP SERPL-CCNC: 74 IU/L (ref 39–117)
ALT SERPL-CCNC: 17 IU/L (ref 0–32)
AST SERPL-CCNC: 19 IU/L (ref 0–40)
BILIRUB SERPL-MCNC: 0.3 MG/DL (ref 0–1.2)
BUN SERPL-MCNC: 14 MG/DL (ref 6–24)
BUN/CREAT SERPL: 22 (ref 9–23)
CALCIUM SERPL-MCNC: 9.2 MG/DL (ref 8.7–10.2)
CHLORIDE SERPL-SCNC: 101 MMOL/L (ref 96–106)
CO2 SERPL-SCNC: 23 MMOL/L (ref 20–29)
CREAT SERPL-MCNC: 0.65 MG/DL (ref 0.57–1)
EST. AVERAGE GLUCOSE BLD GHB EST-MCNC: 134 MG/DL
GLOBULIN SER-MCNC: 2.5 G/DL (ref 1.5–4.5)
GLUCOSE SERPL-MCNC: 108 MG/DL (ref 65–99)
HBA1C MFR BLD: 6.3 % (ref 4.8–5.6)
POTASSIUM SERPL-SCNC: 4.3 MMOL/L (ref 3.5–5.2)
PROT SERPL-MCNC: 6.7 G/DL (ref 6–8.5)
SL AMB EGFR AFRICAN AMERICAN: 128 ML/MIN/1.73
SL AMB EGFR NON AFRICAN AMERICAN: 111 ML/MIN/1.73
SODIUM SERPL-SCNC: 138 MMOL/L (ref 134–144)
T4 FREE SERPL-MCNC: 1.01 NG/DL (ref 0.82–1.77)
TSH SERPL DL<=0.005 MIU/L-ACNC: 2.08 UIU/ML (ref 0.45–4.5)

## 2020-06-10 PROCEDURE — 99214 OFFICE O/P EST MOD 30 MIN: CPT | Performed by: NURSE PRACTITIONER

## 2020-06-10 RX ORDER — LEVOTHYROXINE SODIUM 0.05 MG/1
TABLET ORAL
Qty: 40 TABLET | Refills: 5 | Status: SHIPPED | OUTPATIENT
Start: 2020-06-10 | End: 2020-12-02

## 2020-12-02 DIAGNOSIS — E03.8 HYPOTHYROIDISM DUE TO HASHIMOTO'S THYROIDITIS: ICD-10-CM

## 2020-12-02 DIAGNOSIS — E06.3 HYPOTHYROIDISM DUE TO HASHIMOTO'S THYROIDITIS: ICD-10-CM

## 2020-12-02 RX ORDER — LEVOTHYROXINE SODIUM 0.05 MG/1
TABLET ORAL
Qty: 40 TABLET | Refills: 5 | Status: SHIPPED | OUTPATIENT
Start: 2020-12-02 | End: 2021-05-24

## 2021-05-21 DIAGNOSIS — E06.3 HYPOTHYROIDISM DUE TO HASHIMOTO'S THYROIDITIS: ICD-10-CM

## 2021-05-21 DIAGNOSIS — E03.8 HYPOTHYROIDISM DUE TO HASHIMOTO'S THYROIDITIS: ICD-10-CM

## 2021-05-24 RX ORDER — LEVOTHYROXINE SODIUM 0.05 MG/1
TABLET ORAL
Qty: 40 TABLET | Refills: 5 | Status: SHIPPED | OUTPATIENT
Start: 2021-05-24 | End: 2021-11-03 | Stop reason: SDUPTHER

## 2021-11-03 ENCOUNTER — TELEPHONE (OUTPATIENT)
Dept: ENDOCRINOLOGY | Facility: HOSPITAL | Age: 41
End: 2021-11-03

## 2021-11-03 DIAGNOSIS — E03.8 HYPOTHYROIDISM DUE TO HASHIMOTO'S THYROIDITIS: ICD-10-CM

## 2021-11-03 DIAGNOSIS — E06.3 HYPOTHYROIDISM DUE TO HASHIMOTO'S THYROIDITIS: ICD-10-CM

## 2021-11-03 RX ORDER — LEVOTHYROXINE SODIUM 0.05 MG/1
TABLET ORAL
Qty: 40 TABLET | Refills: 5 | Status: SHIPPED | OUTPATIENT
Start: 2021-11-03 | End: 2022-01-10 | Stop reason: SDUPTHER

## 2021-11-04 DIAGNOSIS — R73.03 PRE-DIABETES: ICD-10-CM

## 2021-11-04 DIAGNOSIS — E03.8 HYPOTHYROIDISM DUE TO HASHIMOTO'S THYROIDITIS: Primary | ICD-10-CM

## 2021-11-04 DIAGNOSIS — E06.3 HYPOTHYROIDISM DUE TO HASHIMOTO'S THYROIDITIS: Primary | ICD-10-CM

## 2021-11-13 LAB
ALBUMIN SERPL-MCNC: 4.2 G/DL (ref 3.8–4.8)
ALBUMIN/GLOB SERPL: 1.6 {RATIO} (ref 1.2–2.2)
ALP SERPL-CCNC: 72 IU/L (ref 44–121)
ALT SERPL-CCNC: 18 IU/L (ref 0–32)
AST SERPL-CCNC: 19 IU/L (ref 0–40)
BASOPHILS # BLD AUTO: 0.1 X10E3/UL (ref 0–0.2)
BASOPHILS NFR BLD AUTO: 1 %
BILIRUB SERPL-MCNC: 0.2 MG/DL (ref 0–1.2)
BUN SERPL-MCNC: 15 MG/DL (ref 6–24)
BUN/CREAT SERPL: 20 (ref 9–23)
CALCIUM SERPL-MCNC: 9.5 MG/DL (ref 8.7–10.2)
CHLORIDE SERPL-SCNC: 101 MMOL/L (ref 96–106)
CHOLEST SERPL-MCNC: 187 MG/DL (ref 100–199)
CO2 SERPL-SCNC: 26 MMOL/L (ref 20–29)
CREAT SERPL-MCNC: 0.76 MG/DL (ref 0.57–1)
EOSINOPHIL # BLD AUTO: 0.3 X10E3/UL (ref 0–0.4)
EOSINOPHIL NFR BLD AUTO: 4 %
ERYTHROCYTE [DISTWIDTH] IN BLOOD BY AUTOMATED COUNT: 13 % (ref 11.7–15.4)
GLOBULIN SER-MCNC: 2.6 G/DL (ref 1.5–4.5)
GLUCOSE SERPL-MCNC: 121 MG/DL (ref 65–99)
HCT VFR BLD AUTO: 41.1 % (ref 34–46.6)
HDLC SERPL-MCNC: 40 MG/DL
HGB BLD-MCNC: 13.7 G/DL (ref 11.1–15.9)
IMM GRANULOCYTES # BLD: 0 X10E3/UL (ref 0–0.1)
IMM GRANULOCYTES NFR BLD: 0 %
LDLC SERPL CALC-MCNC: 126 MG/DL (ref 0–99)
LYMPHOCYTES # BLD AUTO: 2.1 X10E3/UL (ref 0.7–3.1)
LYMPHOCYTES NFR BLD AUTO: 32 %
MCH RBC QN AUTO: 28.5 PG (ref 26.6–33)
MCHC RBC AUTO-ENTMCNC: 33.3 G/DL (ref 31.5–35.7)
MCV RBC AUTO: 85 FL (ref 79–97)
MONOCYTES # BLD AUTO: 0.4 X10E3/UL (ref 0.1–0.9)
MONOCYTES NFR BLD AUTO: 6 %
NEUTROPHILS # BLD AUTO: 3.8 X10E3/UL (ref 1.4–7)
NEUTROPHILS NFR BLD AUTO: 57 %
PLATELET # BLD AUTO: 289 X10E3/UL (ref 150–450)
POTASSIUM SERPL-SCNC: 4.7 MMOL/L (ref 3.5–5.2)
PROT SERPL-MCNC: 6.8 G/DL (ref 6–8.5)
RBC # BLD AUTO: 4.81 X10E6/UL (ref 3.77–5.28)
SL AMB EGFR AFRICAN AMERICAN: 112 ML/MIN/1.73
SL AMB EGFR NON AFRICAN AMERICAN: 97 ML/MIN/1.73
SL AMB VLDL CHOLESTEROL CALC: 21 MG/DL (ref 5–40)
SODIUM SERPL-SCNC: 139 MMOL/L (ref 134–144)
T4 FREE SERPL-MCNC: 1.18 NG/DL (ref 0.82–1.77)
TRIGL SERPL-MCNC: 117 MG/DL (ref 0–149)
TSH SERPL DL<=0.005 MIU/L-ACNC: 2.33 UIU/ML (ref 0.45–4.5)
WBC # BLD AUTO: 6.6 X10E3/UL (ref 3.4–10.8)

## 2021-11-17 ENCOUNTER — OFFICE VISIT (OUTPATIENT)
Dept: ENDOCRINOLOGY | Facility: HOSPITAL | Age: 41
End: 2021-11-17
Payer: COMMERCIAL

## 2021-11-17 VITALS — HEART RATE: 70 BPM | WEIGHT: 293 LBS | HEIGHT: 61 IN | BODY MASS INDEX: 55.32 KG/M2

## 2021-11-17 DIAGNOSIS — E03.8 HYPOTHYROIDISM DUE TO HASHIMOTO'S THYROIDITIS: Primary | ICD-10-CM

## 2021-11-17 DIAGNOSIS — E06.3 HYPOTHYROIDISM DUE TO HASHIMOTO'S THYROIDITIS: Primary | ICD-10-CM

## 2021-11-17 DIAGNOSIS — E66.01 CLASS 3 SEVERE OBESITY DUE TO EXCESS CALORIES WITHOUT SERIOUS COMORBIDITY WITH BODY MASS INDEX (BMI) OF 50.0 TO 59.9 IN ADULT (HCC): ICD-10-CM

## 2021-11-17 DIAGNOSIS — R73.03 PRE-DIABETES: ICD-10-CM

## 2021-11-17 LAB — SL AMB POCT HEMOGLOBIN AIC: 6 (ref ?–6.5)

## 2021-11-17 PROCEDURE — 83036 HEMOGLOBIN GLYCOSYLATED A1C: CPT | Performed by: NURSE PRACTITIONER

## 2021-11-17 PROCEDURE — 99214 OFFICE O/P EST MOD 30 MIN: CPT | Performed by: NURSE PRACTITIONER

## 2022-01-10 DIAGNOSIS — E06.3 HYPOTHYROIDISM DUE TO HASHIMOTO'S THYROIDITIS: Primary | ICD-10-CM

## 2022-01-10 DIAGNOSIS — E03.8 HYPOTHYROIDISM DUE TO HASHIMOTO'S THYROIDITIS: Primary | ICD-10-CM

## 2022-01-10 RX ORDER — LEVOTHYROXINE SODIUM 0.05 MG/1
TABLET ORAL
Qty: 120 TABLET | Refills: 3 | Status: SHIPPED | OUTPATIENT
Start: 2022-01-10 | End: 2022-05-17 | Stop reason: SDUPTHER

## 2022-04-18 ENCOUNTER — CONSULT (OUTPATIENT)
Dept: BARIATRICS | Facility: CLINIC | Age: 42
End: 2022-04-18
Payer: COMMERCIAL

## 2022-04-18 VITALS
RESPIRATION RATE: 14 BRPM | DIASTOLIC BLOOD PRESSURE: 78 MMHG | TEMPERATURE: 98 F | SYSTOLIC BLOOD PRESSURE: 122 MMHG | WEIGHT: 293 LBS | BODY MASS INDEX: 53.92 KG/M2 | HEIGHT: 62 IN | HEART RATE: 83 BPM

## 2022-04-18 DIAGNOSIS — E03.9 HYPOTHYROIDISM, UNSPECIFIED TYPE: ICD-10-CM

## 2022-04-18 DIAGNOSIS — R73.03 PREDIABETES: ICD-10-CM

## 2022-04-18 DIAGNOSIS — Z91.89 RISK FACTORS FOR OBSTRUCTIVE SLEEP APNEA: ICD-10-CM

## 2022-04-18 DIAGNOSIS — R73.03 PRE-DIABETES: ICD-10-CM

## 2022-04-18 DIAGNOSIS — E66.01 CLASS 3 SEVERE OBESITY DUE TO EXCESS CALORIES WITHOUT SERIOUS COMORBIDITY WITH BODY MASS INDEX (BMI) OF 50.0 TO 59.9 IN ADULT (HCC): Primary | ICD-10-CM

## 2022-04-18 PROBLEM — E66.813 CLASS 3 SEVERE OBESITY DUE TO EXCESS CALORIES WITHOUT SERIOUS COMORBIDITY WITH BODY MASS INDEX (BMI) OF 50.0 TO 59.9 IN ADULT (HCC): Status: ACTIVE | Noted: 2022-04-18

## 2022-04-18 PROBLEM — E78.5 HYPERLIPIDEMIA: Status: ACTIVE | Noted: 2022-04-18

## 2022-04-18 PROBLEM — F41.9 ANXIETY: Status: ACTIVE | Noted: 2022-04-18

## 2022-04-18 PROCEDURE — 99204 OFFICE O/P NEW MOD 45 MIN: CPT | Performed by: PHYSICIAN ASSISTANT

## 2022-04-18 NOTE — PROGRESS NOTES
Assessment/Plan:    Prediabetes  -should improve with weight loss, dietary, and lifestyle changes      Class 3 severe obesity due to excess calories without serious comorbidity with body mass index (BMI) of 50 0 to 59 9 in [facility])  -Discussed options of HealthyCORE-Intensive Lifestyle Intervention Program, Very Low Calorie Diet-VLCD, Conservative Program, Yesenia-En-Y Gastric Bypass and Vertical Sleeve Gastrectomy and the role of weight loss medications   -Initial weight loss goal of 5-10% weight loss for improved health  -Screening labs  Recommend checking lab coverage before having labs drawn  -NEEDS LABS or Labs reviewed from 10 all within acceptable limits  - STOP BANG--sleep study referred    -Patient is interested in pursuing healthy core        Hypothyroidism  On levothyroxine   Followed by endo    Risk factors for obstructive sleep apnea  STOP BAN/6  - Sleep medicine referral  -Discussed risks of untreated sleep apnea such as sudden cardiac death by arrhythmia, uncontrolled hypertension, difficulty with weight loss, decreased quality sleep, increased insulin resistance, and stroke  -Should improve with dietary and lifestyle changes          Follow up in approximately 2 weeks with Non-Surgical Dietician  Diagnoses and all orders for this visit:    Class 3 severe obesity due to excess calories without serious comorbidity with body mass index (BMI) of 50 0 to 59 9 in [facility])  -     Ambulatory referral to Weight Management  -     Ambulatory Referral to Sleep Medicine; Future    Pre-diabetes  -     Ambulatory referral to Weight Management  -     Ambulatory Referral to Sleep Medicine; Future    Prediabetes    Hypothyroidism, unspecified type    Risk factors for obstructive sleep apnea  -     Ambulatory Referral to Sleep Medicine; Future          Subjective:   Chief Complaint   Patient presents with    Consult     MWM Consult         Patient ID: Cornelius Marc  is a 36 y o  female with excess weight/obesity here to pursue weight management  Started noom 3/29 about 6 weeks ago  She is doing 1400 calories on noom  She avoids soy due to hypothyroidism    Her endo had recommended saxenda years ago but insurance did not cover it  History reviewed  No pertinent past medical history  HPI:  Obesity/Excess Weight:  Severity: Very Severe  Onset:  Whole life    Modifiers: Diet and Exercise and noom  Contributing factors: hypothyroidism  Associated symptoms: comorbid conditions and increased joint pain    Goals:180  Hydration:min  44 ounces water, 1 coffee with 2 splenda and 1 cream, sometimes green tea w/ sugar  Alcohol: less than monthly  Exercise:none  Occupation:    Colonoscopy-Not applicable    The following portions of the patient's history were reviewed and updated as appropriate:   She  has no past medical history on file  She   Patient Active Problem List    Diagnosis Date Noted    Anxiety 2022    Hyperlipidemia 2022    Prediabetes 2022    Class 3 severe obesity due to excess calories without serious comorbidity with body mass index (BMI) of 50 0 to 59 9 in adult [facility]) 2022    Risk factors for obstructive sleep apnea 2022    Hypothyroidism 2018     She  has a past surgical history that includes  section  Her family history includes Diabetes type II in her maternal aunt, maternal uncle, paternal grandmother, and paternal uncle; Hyperlipidemia in her father; Hypothyroidism in her mother; Lung cancer in her mother  She  reports that she quit smoking about 8 years ago  Her smoking use included cigarettes  She has a 2 50 pack-year smoking history  She has never used smokeless tobacco  She reports current alcohol use  She reports that she does not use drugs    Current Outpatient Medications   Medication Sig Dispense Refill    levonorgestrel (MIRENA, 52 MG,) 20 MCG/24HR IUD by Intrauterine route      levothyroxine 50 mcg tablet Take 1 tablet daily and take 2 pills Friday, Saturday and Sunday  120 tablet 3    sertraline (ZOLOFT) 25 mg tablet Take 25 mg by mouth daily  1     No current facility-administered medications for this visit  Current Outpatient Medications on File Prior to Visit   Medication Sig    levonorgestrel (MIRENA, 52 MG,) 20 MCG/24HR IUD by Intrauterine route    levothyroxine 50 mcg tablet Take 1 tablet daily and take 2 pills Friday, Saturday and Sunday   sertraline (ZOLOFT) 25 mg tablet Take 25 mg by mouth daily    [DISCONTINUED] liraglutide (SAXENDA) injection Inject 0 1 mL (0 6 mg total) under the skin daily Week 1 at 0 6 mg, week 2 at 1 2 mg, week 3 at 1 8 mg, week for at 2 4 mg and week 5 full dose of 3 0 mg (Patient not taking: Reported on 4/18/2022 )    [DISCONTINUED] omeprazole (PriLOSEC) 20 mg delayed release capsule Take 20 mg by mouth daily (Patient not taking: Reported on 11/17/2021 )     No current facility-administered medications on file prior to visit  She has No Known Allergies       Review of Systems   Constitutional: Negative for chills and fever  Respiratory: Positive for shortness of breath (sometimes awith exertion)  Cardiovascular: Positive for chest pain (anxiety related)  Negative for palpitations  Gastrointestinal: Negative for abdominal pain, constipation, diarrhea and vomiting  History GERD-controlled   Genitourinary: Negative for difficulty urinating  Musculoskeletal: Negative for arthralgias and back pain  Skin: Negative for rash  Neurological: Negative for headaches  Psychiatric/Behavioral: Negative for dysphoric mood  The patient is nervous/anxious  Objective:    /78   Pulse 83   Temp 98 °F (36 7 °C) (Tympanic)   Resp 14   Ht 5' 1 5" (1 562 m)   Wt (!) 144 kg (316 lb 11 2 oz)   BMI 58 87 kg/m²     Physical Exam  Vitals and nursing note reviewed  Constitutional:       General: She is not in acute distress  Appearance: She is well-developed   She is obese    HENT:      Head: Normocephalic and atraumatic  Eyes:      Conjunctiva/sclera: Conjunctivae normal    Neck:      Thyroid: No thyromegaly  Pulmonary:      Effort: Pulmonary effort is normal  No respiratory distress  Skin:     Findings: No rash (visible)  Neurological:      Mental Status: She is alert and oriented to person, place, and time     Psychiatric:         Mood and Affect: Mood normal          Behavior: Behavior normal

## 2022-04-18 NOTE — ASSESSMENT & PLAN NOTE
STOP BAN/6  - Sleep medicine referral  -Discussed risks of untreated sleep apnea such as sudden cardiac death by arrhythmia, uncontrolled hypertension, difficulty with weight loss, decreased quality sleep, increased insulin resistance, and stroke    -Should improve with dietary and lifestyle changes

## 2022-04-18 NOTE — ASSESSMENT & PLAN NOTE
-Discussed options of HealthyCORE-Intensive Lifestyle Intervention Program, Very Low Calorie Diet-VLCD, Conservative Program, Yesenia-En-Y Gastric Bypass and Vertical Sleeve Gastrectomy and the role of weight loss medications   -Initial weight loss goal of 5-10% weight loss for improved health  -Screening labs  Recommend checking lab coverage before having labs drawn    -NEEDS LABS or Labs reviewed from 10 all within acceptable limits  - STOP BANG-4/8-sleep study referred    -Patient is interested in pursuing healthy core

## 2022-05-09 LAB
ALBUMIN SERPL-MCNC: 4 G/DL (ref 3.6–5.1)
ALBUMIN/GLOB SERPL: 1.6 (CALC) (ref 1–2.5)
ALP SERPL-CCNC: 63 U/L (ref 31–125)
ALT SERPL-CCNC: 18 U/L (ref 6–29)
AST SERPL-CCNC: 16 U/L (ref 10–30)
BILIRUB SERPL-MCNC: 0.3 MG/DL (ref 0.2–1.2)
BUN SERPL-MCNC: 11 MG/DL (ref 7–25)
BUN/CREAT SERPL: ABNORMAL (CALC) (ref 6–22)
CALCIUM SERPL-MCNC: 8.9 MG/DL (ref 8.6–10.2)
CHLORIDE SERPL-SCNC: 104 MMOL/L (ref 98–110)
CHOLEST SERPL-MCNC: 195 MG/DL
CHOLEST/HDLC SERPL: 4.1 (CALC)
CO2 SERPL-SCNC: 26 MMOL/L (ref 20–32)
CREAT SERPL-MCNC: 0.59 MG/DL (ref 0.5–1.1)
EST. AVERAGE GLUCOSE BLD GHB EST-MCNC: 120 MG/DL
EST. AVERAGE GLUCOSE BLD GHB EST-SCNC: 6.6 MMOL/L
GLOBULIN SER CALC-MCNC: 2.5 G/DL (CALC) (ref 1.9–3.7)
GLUCOSE SERPL-MCNC: 111 MG/DL (ref 65–99)
HBA1C MFR BLD: 5.8 % OF TOTAL HGB
HDLC SERPL-MCNC: 48 MG/DL
LDLC SERPL CALC-MCNC: 120 MG/DL (CALC)
NONHDLC SERPL-MCNC: 147 MG/DL (CALC)
POTASSIUM SERPL-SCNC: 4.4 MMOL/L (ref 3.5–5.3)
PROT SERPL-MCNC: 6.5 G/DL (ref 6.1–8.1)
SL AMB EGFR AFRICAN AMERICAN: 130 ML/MIN/1.73M2
SL AMB EGFR NON AFRICAN AMERICAN: 112 ML/MIN/1.73M2
SODIUM SERPL-SCNC: 139 MMOL/L (ref 135–146)
T4 FREE SERPL-MCNC: 1.2 NG/DL (ref 0.8–1.8)
TRIGL SERPL-MCNC: 155 MG/DL
TSH SERPL-ACNC: 1.8 MIU/L

## 2022-05-17 ENCOUNTER — OFFICE VISIT (OUTPATIENT)
Dept: ENDOCRINOLOGY | Facility: HOSPITAL | Age: 42
End: 2022-05-17
Payer: COMMERCIAL

## 2022-05-17 VITALS
DIASTOLIC BLOOD PRESSURE: 74 MMHG | HEIGHT: 62 IN | WEIGHT: 293 LBS | BODY MASS INDEX: 53.92 KG/M2 | SYSTOLIC BLOOD PRESSURE: 124 MMHG | HEART RATE: 84 BPM

## 2022-05-17 DIAGNOSIS — R73.03 PRE-DIABETES: ICD-10-CM

## 2022-05-17 DIAGNOSIS — E06.3 HYPOTHYROIDISM DUE TO HASHIMOTO'S THYROIDITIS: Primary | ICD-10-CM

## 2022-05-17 DIAGNOSIS — E03.8 HYPOTHYROIDISM DUE TO HASHIMOTO'S THYROIDITIS: Primary | ICD-10-CM

## 2022-05-17 DIAGNOSIS — E66.01 CLASS 3 SEVERE OBESITY DUE TO EXCESS CALORIES WITHOUT SERIOUS COMORBIDITY WITH BODY MASS INDEX (BMI) OF 50.0 TO 59.9 IN ADULT (HCC): ICD-10-CM

## 2022-05-17 PROCEDURE — 99214 OFFICE O/P EST MOD 30 MIN: CPT | Performed by: NURSE PRACTITIONER

## 2022-05-17 RX ORDER — LEVOTHYROXINE SODIUM 0.05 MG/1
TABLET ORAL
Qty: 120 TABLET | Refills: 3 | Status: SHIPPED | OUTPATIENT
Start: 2022-05-17

## 2022-05-17 NOTE — PROGRESS NOTES
Weight Management Medical Nutrition Assessment  Mily Campos is here for menu planning  Seen by PA last month  Current wt: 316 4 lbs  Her weight is stable over the last month  Reports she has been using Noom for the last 2 months at around 1700 calories/day  She did initially lose 15 lbs at this amount  Based on recall she is actually not consuming near this on some days or is back ending the calories  Discussed importance of adequate intake as well as being consistent throughout the day  She would likely benefit from a protein shake in the morning due typically being rushed  Also identified that hydration is lacking  Meal plan along with other resources and food scale provided  She will f/u with a bundle in 1 month        Dislikes: fish, cottage cheese    Patient seen by Medical Provider in past 6 months:  yes  Requested to schedule appointment with Medical Provider: Yes    Anthropometric Measurements  Start Weight (#): 316 7 lbs   Current Weight (#):  316 4 lbs   TBW % Change from start weight: -  Ideal Body Weight (#): 134 1 lbs BMI 25 (61 5")  Goal Weight (#): STG <300 lbs;  lbs   Highest: 329 lbs   Lowest:  145 lbs early 20's    Weight Loss History  Previous weight loss attempts: Exercise  Self Created Diets (Portion Control, Healthy Food Choices, etc )  OTC medications  Weight Watchers     Food and Nutrition Related History  Wake up: 6:30   Bed Time: 9-11    Food Recall  Breakfast: 8:30-9:00: [address] [employer]   Snack: 11:00 skip OR sf jello OR fruit cup OR sf pudding  Lunch:12:30: salad w/ham/chicken, cheese, light caesar OR ~6 oz chicken  OR 2 egg omelet on rice cakes  Snack: as above   Dinner: 5:30-6:00: ~6 oz protein, 1/2-1 cup carb, ~1 cup veggies   Snack: sometimes ice cream OR skip     Beverages: water and coffee  Volume of beverage intake: 44 oz water, 1 cup coffee    Weekends: Worse, less structure  Cravings: ice cream, carbs  Trouble area of day: sometimes lunch/dinner     Frequency of Eating out: 1x/wk  Food restrictions: soy  Cooking: self   Food Shopping: self    Physical Activity Intake  Activity:just started walking  Frequency:3x/wk 30 minutes  Physical limitations/barriers to exercise: n/a    Estimated Needs  Energy  [place] Energy Needs: BMR : 2035  1-2# loss weekly sedentary:  8168-1260            1-2# loss weekly lightly active: 8199-0953  Maintenance calories for sedentary activity level: 2443  Protein: 59-73 gm      (1 2-1 5g/kg IBW)  Fluid: 85 oz     (35mL/kg adjusted IBW)    Nutrition Diagnosis  Yes;     Overweight/obesity  related to Excess energy intake as evidenced by  BMI more than normative standard for age and sex (obesity-grade III 36+)       Nutrition Intervention    Nutrition Prescription  Calories: 3520-7629  Protein:  gm  Carbs:  gm    Meal Plan (Esvin/Pro/Carb)  Breakfast: 200-250, 25, 22  Snack: 100-150, 5-10, 10-20  Lunch: 325-400, 25-30, 20-25  Snack: 100-150, 5-10, 10-20  Dinner: 350-400, 30, 30-40  Snack: 0-150, 0-10, 0-20    Nutrition Education:    Calorie controlled menu  Lean protein food choices  Healthy snack options  Food journaling tips    Nutrition Counseling:  Strategies: meal planning, portion sizes, healthy snack choices, hydration, fiber intake, protein intake, exercise, food journal      Monitoring and Evaluation:  Evaluation criteria:  Energy Intake  Meet protein needs  Maintain adequate hydration  Monitor weekly weight  Meal planning/preparation  Food journal   Decreased portions at mealtimes and snacks  Physical activity     Barriers to learning:none  Readiness to change: Action:  (Changing behavior)  Comprehension: very good  Expected Compliance: very good

## 2022-05-17 NOTE — PATIENT INSTRUCTIONS
Be mindful of diet  Exercise regularly  Stay hydrated  Continue levothyroxine 50 mcg daily Monday through Thursday with 2 tablets on Friday, Saturday and Sunday  Obtain thyroid function lab work prior to next office visit  Continue to follow up with weight management

## 2022-05-17 NOTE — PROGRESS NOTES
Marissa Pulido 36 y o  female MRN: [id number]    Encounter: [id number]      Assessment/Plan     Assessment: This is a 36y o -year-old female with hypothyroidism due to Hashimoto's thyroiditis, pre diabetes and obesity      Plan:  1  Thyromegaly:  She denies any neck compressive symptoms at this time   Her most recent ultrasound of the thyroid showed a heterogeneous thyroid with no discrete nodules      2  Hypothyroidism secondary to Hashimoto's thyroiditis:  She complains of some fatigue   Her most recent TSH is normal   For now, continue levothyroxine at current dose    Recheck TSH and free T4 prior to next office visit       3  Weight gain and history of prediabetes: Her hemoglobin A1c hemoglobin A1c was 5 8   Discussed the importance of diet and regular exercise  She is continuing to following up with Ashley Lara's weight management at length during the time of the visit  Check hemoglobin A1c and comprehensive metabolic panel prior to next visit  CC: Hypothyroidism due to Hashimoto's thyroiditis    History of Present Illness     HPI:  36 y o  female for follow-up of Hashimoto's thyroiditis, pre diabetes and obesity   She reports thyromegaly and thyroid dysfunction was found after pregnancy in the setting of uncontrolled weight gain  She takes levothyroxine 50 mcg daily Monday through Thursday with 2 tablets on Friday, Saturday and Sunday consistently and appropriately  Rondi Heading most recent TSH from May 9, 2022 was 1 80 with a free T4 of 1 2  Rondi Heading most recent thyroid ultrasound from June 5, 2018 shows a grossly stable thyroid heterogeneous in nature with no discrete nodules  Overall, she notes she feels okay  She does report fatigue  Carey Perez is concerned about her difficulty with weight loss   She also had diagnosis of prediabetes in 2018  She has recently followed up with weight management  Her hemoglobin A1c performed on May 9, 2022 is 5 8  She denies any polydipsia, polyuria or polyphagia   Helio Ann denies family history of thyroid cancer, but does note her mother had an overactive thyroid had her thyroid removed  She denies any anterior neck discomfort, dysphagia or dysphonia      Review of Systems   Constitutional: Positive for fatigue  Negative for chills and fever  HENT: Negative  Negative for trouble swallowing and voice change  Eyes: Negative  Negative for visual disturbance  Respiratory: Negative  Negative for chest tightness and shortness of breath  Cardiovascular: Negative  Negative for chest pain  Gastrointestinal: Negative  Negative for abdominal pain, constipation, diarrhea and vomiting  Endocrine: Negative for cold intolerance, heat intolerance, polydipsia, polyphagia and polyuria  Genitourinary: Negative  Musculoskeletal: Positive for arthralgias (Bilateral knees)  Skin: Negative  Allergic/Immunologic: Negative  Neurological: Negative  Negative for dizziness, syncope, light-headedness and headaches  Hematological: Negative  Psychiatric/Behavioral: Negative  All other systems reviewed and are negative  Historical Information   No past medical history on file    Past Surgical History:   Procedure Laterality Date     SECTION       Social History   Social History     Substance and Sexual Activity   Alcohol Use Yes    Comment: Social     Social History     Substance and Sexual Activity   Drug Use Never     Social History     Tobacco Use   Smoking Status Former Smoker    Packs/day: 0 25    Years: 10 00    Pack years: 2 50    Types: Cigarettes    Quit date: 2013    Years since quittin 8   Smokeless Tobacco Never Used     Family History:   Family History   Problem Relation Age of Onset    Hypothyroidism Mother     Lung cancer Mother     Hyperlipidemia Father     Diabetes type II Maternal Aunt     Diabetes type II Maternal Uncle     Diabetes type II Paternal Uncle     Diabetes type II Paternal Grandmother        Meds/Allergies   Current Outpatient Medications   Medication Sig Dispense Refill    levonorgestrel (MIRENA) 20 MCG/24HR IUD by Intrauterine route      levothyroxine 50 mcg tablet Take 1 tablet daily and take 2 pills Friday, Saturday and Sunday  120 tablet 3    sertraline (ZOLOFT) 25 mg tablet Take 25 mg by mouth daily  1     No current facility-administered medications for this visit  No Known Allergies    Objective   Vitals: Blood pressure 124/74, pulse 84, height 5' 1 5" (1 562 m), weight (!) 144 kg (318 lb 6 4 oz)  Physical Exam  Vitals reviewed  Constitutional:       Appearance: She is well-developed  She is obese  HENT:      Head: Normocephalic and atraumatic  Eyes:      Conjunctiva/sclera: Conjunctivae normal       Pupils: Pupils are equal, round, and reactive to light  Comments: Wears glasses   Cardiovascular:      Rate and Rhythm: Normal rate and regular rhythm  Heart sounds: Normal heart sounds  Pulmonary:      Effort: Pulmonary effort is normal       Breath sounds: Normal breath sounds  Abdominal:      General: Bowel sounds are normal       Palpations: Abdomen is soft  Musculoskeletal:         General: Normal range of motion  Cervical back: Normal range of motion and neck supple  Skin:     General: Skin is warm and dry  Neurological:      Mental Status: She is alert and oriented to person, place, and time  Psychiatric:         Behavior: Behavior normal          Thought Content: Thought content normal          Judgment: Judgment normal        Lab Results:   Lab Results   Component Value Date/Time    Free t4 1 2 05/09/2022 07:15 AM    Free t4 1 18 11/12/2021 07:37 AM       Imaging Studies:   Results for orders placed in visit on 06/05/18     thyroid    Portions of the record may have been created with voice recognition software  Occasional wrong word or "sound a like" substitutions may have occurred due to the inherent limitations of voice recognition software   Read the chart carefully and recognize, using context, where substitutions have occurred

## 2022-05-25 ENCOUNTER — OFFICE VISIT (OUTPATIENT)
Dept: BARIATRICS | Facility: CLINIC | Age: 42
End: 2022-05-25

## 2022-05-25 VITALS — HEIGHT: 62 IN | BODY MASS INDEX: 53.92 KG/M2 | WEIGHT: 293 LBS

## 2022-05-25 DIAGNOSIS — R63.5 ABNORMAL WEIGHT GAIN: ICD-10-CM

## 2022-05-25 PROCEDURE — WMDI60

## 2022-05-25 PROCEDURE — RECHECK

## 2022-06-21 NOTE — PROGRESS NOTES
Weight Management Medical Nutrition Assessment  Keysha Pratt is here for meal planning  Current wt: 319  lbs  She has gained 2 6 lbs over the last month  She is frustrated with this initially but then admits she has not been following recommendations  Tracking but doesn't feel its as she should, not measuring portions, poor water intake, less walking  Finances have been a concern lately so we addressed shopping on a budget and different ways she can save  Reinforced prior recommendations  She will f/u in 1 month       Dislikes: fish, cottage cheese    Patient seen by Medical Provider in past 6 months:  yes  Requested to schedule appointment with Medical Provider: No    Anthropometric Measurements  Start Weight (#): 316 7 lbs   Current Weight (#):  319 lbs   TBW % Change from start weight: -  Ideal Body Weight (#): 134 1 lbs BMI 25 (61 5")  Goal Weight (#): STG <300 lbs;  lbs   Highest: 329 lbs   Lowest:  145 lbs early 20's    Weight Loss History  Previous weight loss attempts: Exercise  Self Created Diets (Portion Control, Healthy Food Choices, etc )  OTC medications  Weight Watchers     Food and Nutrition Related History  Wake up: 6:30   Bed Time: 9-11    Food Recall  Breakfast: 7:30: premier, sometimes apple   Snack: 9:30-11:00  Apple  martinez peanut OR skip   Lunch:12:30: hot dog on bun, 130 chewy granola bar OR chicken, sometimes chicken   Snack: skip   Dinner: 5:30-6:00: ~6 oz protein, 1/2-1 cup carb, ~1 cup veggies   Snack: sometimes ice cream OR skip     Beverages: water and coffee  Volume of beverage intake: 44 oz water, 1 cup coffee    Weekends: Worse, less structure  Cravings: ice cream, carbs  Trouble area of day: sometimes lunch/dinner     Frequency of Eating out: 1x/wk  Food restrictions: soy  Cooking: self   Food Shopping: self    Physical Activity Intake  Activity: walking  Frequency:1-2x/wk, 30 min  Physical limitations/barriers to exercise: n/a    Estimated Needs  Energy  [employer] Needs: BMR : 2035  1-2# loss weekly sedentary:  8193-1590            1-2# loss weekly lightly active: 9333-4962  Maintenance calories for sedentary activity level: 2443  Protein: 59-73 gm      (1 2-1 5g/kg IBW)  Fluid: 85 oz     (35mL/kg adjusted IBW)    Nutrition Diagnosis  Yes;     Overweight/obesity  related to Excess energy intake as evidenced by  BMI more than normative standard for age and sex (obesity-grade III 36+)       Nutrition Intervention    Nutrition Prescription  Calories: 2454-7291  Protein:  gm  Carbs:  gm    Meal Plan (Esvin/Pro/Carb)  Breakfast: 200-250, 25, 22  Snack: 100-150, 5-10, 10-20  Lunch: 325-400, 25-30, 20-25  Snack: 100-150, 5-10, 10-20  Dinner: 350-400, 30, 30-40  Snack: 0-150, 0-10, 0-20    Nutrition Education:    Calorie controlled menu  Lean protein food choices  Healthy snack options  Food journaling tips    Nutrition Counseling:  Strategies: meal planning, portion sizes, healthy snack choices, hydration, fiber intake, protein intake, exercise, food journal      Monitoring and Evaluation:  Evaluation criteria:  Energy Intake  Meet protein needs  Maintain adequate hydration  Monitor weekly weight  Meal planning/preparation  Food journal   Decreased portions at mealtimes and snacks  Physical activity     Barriers to learning:none  Readiness to change: Preparation:  (Getting ready to change)  and Action:  (Changing behavior)  Comprehension: very good  Expected Compliance: good

## 2022-06-29 ENCOUNTER — OFFICE VISIT (OUTPATIENT)
Dept: BARIATRICS | Facility: CLINIC | Age: 42
End: 2022-06-29

## 2022-06-29 VITALS — WEIGHT: 293 LBS | HEIGHT: 62 IN | BODY MASS INDEX: 53.92 KG/M2

## 2022-06-29 DIAGNOSIS — R63.5 ABNORMAL WEIGHT GAIN: Primary | ICD-10-CM

## 2022-06-29 DIAGNOSIS — R63.5 ABNORMAL WEIGHT GAIN: ICD-10-CM

## 2022-06-29 PROCEDURE — WMDI30

## 2022-06-29 PROCEDURE — RECHECK

## 2022-06-29 NOTE — PROGRESS NOTES
Weight management bundle  Behavorial Health Support      Daughter (8) in day care and finances are impacted due to the extra expense  Daughter takes a packed lunch to day care with water  Dinner last night was hot doge on a bun  Tonight will be hambuger on a bun  Glenice Parkinson for  and daughter  She plans on having a salad  Works 5 mins from her home  multitasking at work but forgets a lot of things through out the day  Feels a lot is on her schedule for responsibility  Started on Wellbutrin  Stopped smoking  Goal is to lose weight by following the program      Discussed time management  Possible considering the bariatric surgery

## 2022-07-19 ENCOUNTER — OFFICE VISIT (OUTPATIENT)
Dept: BARIATRICS | Facility: CLINIC | Age: 42
End: 2022-07-19
Payer: COMMERCIAL

## 2022-07-19 VITALS
HEART RATE: 96 BPM | RESPIRATION RATE: 16 BRPM | WEIGHT: 293 LBS | BODY MASS INDEX: 55.32 KG/M2 | HEIGHT: 61 IN | SYSTOLIC BLOOD PRESSURE: 134 MMHG | DIASTOLIC BLOOD PRESSURE: 80 MMHG

## 2022-07-19 DIAGNOSIS — E66.01 CLASS 3 SEVERE OBESITY DUE TO EXCESS CALORIES WITHOUT SERIOUS COMORBIDITY WITH BODY MASS INDEX (BMI) OF 50.0 TO 59.9 IN ADULT (HCC): Primary | ICD-10-CM

## 2022-07-19 DIAGNOSIS — E06.3 HYPOTHYROIDISM DUE TO HASHIMOTO'S THYROIDITIS: ICD-10-CM

## 2022-07-19 DIAGNOSIS — R73.03 PREDIABETES: ICD-10-CM

## 2022-07-19 DIAGNOSIS — E03.8 HYPOTHYROIDISM DUE TO HASHIMOTO'S THYROIDITIS: ICD-10-CM

## 2022-07-19 PROCEDURE — 99214 OFFICE O/P EST MOD 30 MIN: CPT | Performed by: PHYSICIAN ASSISTANT

## 2022-07-19 RX ORDER — BUPROPION HYDROCHLORIDE 150 MG/1
150 TABLET ORAL EVERY MORNING
COMMUNITY
Start: 2022-07-05

## 2022-07-19 RX ORDER — METFORMIN HYDROCHLORIDE 750 MG/1
750 TABLET, EXTENDED RELEASE ORAL DAILY
Qty: 30 TABLET | Refills: 1 | Status: SHIPPED | OUTPATIENT
Start: 2022-07-19 | End: 2022-09-07

## 2022-07-19 NOTE — ASSESSMENT & PLAN NOTE
-Patient is pursuing Conservative Program with RD meal planning  -Initial weight loss goal of 5-10% weight loss for improved health  -Screening labs 5/9/22    Initial:316 8  Current:319 2  Change:+2 4  Goal:    Currently on wellbutrin but having mood changes  She is planning to contact PCP to restart zoloft  Will start metformin 750XR to help with weight loss  Side effects discussed  Also discussed possible phentermine and would need ekg prior  NO AOM coverage    She is interested in surgical  Consult since she has been unsuccessful so far    Did discuss so of the weight gain may be related to quitting smoking

## 2022-07-19 NOTE — PROGRESS NOTES
Assessment/Plan:    Class 3 severe obesity due to excess calories without serious comorbidity with body mass index (BMI) of 50 0 to 59 9 in adult [facility])  -Patient is pursuing Conservative Program with RD meal planning  -Initial weight loss goal of 5-10% weight loss for improved health  -Screening labs 5/9/22    Initial:316 8  Current:319 2  Change:+2 4  Goal:    Currently on wellbutrin but having mood changes  She is planning to contact PCP to restart zoloft  Will start metformin 750XR to help with weight loss  Side effects discussed  Also discussed possible phentermine and would need ekg prior  NO AOM coverage    She is interested in surgical  Consult since she has been unsuccessful so far  Did discuss so of the weight gain may be related to quitting smoking    Hypothyroidism  On levothyroxine   Followed by endo    Prediabetes  To start metformin         Follow up in approximately 1 month with Surgical Dietician, Surgical  and Surgical   Diagnoses and all orders for this visit:    Class 3 severe obesity due to excess calories without serious comorbidity with body mass index (BMI) of 50 0 to 59 9 in adult ([facility])  -     metFORMIN (GLUCOPHAGE-XR) 750 mg 24 hr tablet; Take 1 tablet (750 mg total) by mouth in the morning    Prediabetes  -     metFORMIN (GLUCOPHAGE-XR) 750 mg 24 hr tablet; Take 1 tablet (750 mg total) by mouth in the morning    Hypothyroidism due to Hashimoto's thyroiditis    Other orders  -     buPROPion (WELLBUTRIN XL) 150 mg 24 hr tablet; Take 150 mg by mouth every morning          Subjective:   Chief Complaint   Patient presents with    Follow-up     MWM 3mth f/u; waist 58in        Patient ID: Jim Wen  is a 36 y o  female with excess weight/obesity here to pursue weight managment  Patient is pursuing Conservative Program      HPI  She was switched from zoloft to wellbutrin  It helped her quit smoking but is not helping with weight loss or her mood   She is interested in surgery but it makes her nervous due to hearing complications  Wt Readings from Last 10 Encounters:   22 (!) 145 kg (319 lb 3 2 oz)   22 (!) 145 kg (319 lb)   22 (!) 144 kg (316 lb 6 4 oz)   22 (!) 144 kg (318 lb 6 4 oz)   22 (!) 144 kg (316 lb 11 2 oz)   21 (!) 147 kg (323 lb)   05/10/19 (!) 139 kg (305 lb 9 6 oz)   10/31/18 135 kg (296 lb 12 8 oz)   18 134 kg (295 lb 3 2 oz)   18 129 kg (285 lb 2 oz)       Food logging:yes but not measuring and just estimating portions  Increased appetite/cravings:no  Fruit/Vegetable servings:  Exercise:walking  Hydration:water 40-50 oz and coffee    Colonoscopy-Not applicable    The following portions of the patient's history were reviewed and updated as appropriate:   She  has no past medical history on file  She   Patient Active Problem List    Diagnosis Date Noted    Anxiety 2022    Hyperlipidemia 2022    Prediabetes 2022    Class 3 severe obesity due to excess calories without serious comorbidity with body mass index (BMI) of 50 0 to 59 9 in adult [facility]) 2022    Risk factors for obstructive sleep apnea 2022    Hypothyroidism 2018     She  has a past surgical history that includes  section  Her family history includes Diabetes type II in her maternal aunt, maternal uncle, paternal grandmother, and paternal uncle; Hyperlipidemia in her father; Hypothyroidism in her mother; Lung cancer in her mother  She  reports that she quit smoking about 9 years ago  Her smoking use included cigarettes  She has a 2 50 pack-year smoking history  She has never used smokeless tobacco  She reports current alcohol use  She reports that she does not use drugs    Current Outpatient Medications   Medication Sig Dispense Refill    buPROPion (WELLBUTRIN XL) 150 mg 24 hr tablet Take 150 mg by mouth every morning      levonorgestrel (MIRENA) 20 MCG/24HR IUD by Intrauterine route      levothyroxine 50 mcg tablet Take 1 tablet daily and take 2 pills Friday, Saturday and Sunday  120 tablet 3    metFORMIN (GLUCOPHAGE-XR) 750 mg 24 hr tablet Take 1 tablet (750 mg total) by mouth in the morning 30 tablet 1    sertraline (ZOLOFT) 25 mg tablet Take 25 mg by mouth daily (Patient not taking: Reported on 7/19/2022)  1     No current facility-administered medications for this visit  Current Outpatient Medications on File Prior to Visit   Medication Sig    buPROPion (WELLBUTRIN XL) 150 mg 24 hr tablet Take 150 mg by mouth every morning    levonorgestrel (MIRENA) 20 MCG/24HR IUD by Intrauterine route    levothyroxine 50 mcg tablet Take 1 tablet daily and take 2 pills Friday, Saturday and Sunday   sertraline (ZOLOFT) 25 mg tablet Take 25 mg by mouth daily (Patient not taking: Reported on 7/19/2022)     No current facility-administered medications on file prior to visit  She has No Known Allergies       Review of Systems   Constitutional: Negative for chills and fever  Respiratory: Negative for shortness of breath  Cardiovascular: Negative for chest pain and palpitations  Gastrointestinal: Negative for abdominal pain, constipation, diarrhea and vomiting  Rare GERD-controlled   Genitourinary: Negative for difficulty urinating  Musculoskeletal: Negative for arthralgias and back pain  Skin: Negative for rash  Neurological: Negative for headaches  Psychiatric/Behavioral: Positive for agitation (feels more easily irritated on wellbutrin) and dysphoric mood (more depressed on wellbutrin)  The patient is not nervous/anxious  Objective:    /80   Pulse 96   Resp 16   Ht 5' 1 25" (1 556 m)   Wt (!) 145 kg (319 lb 3 2 oz)   Breastfeeding No   BMI 59 82 kg/m²      Physical Exam  Vitals and nursing note reviewed  Constitutional:       General: She is not in acute distress  Appearance: She is well-developed  She is obese     HENT:      Head: Normocephalic and atraumatic  Eyes:      Conjunctiva/sclera: Conjunctivae normal    Neck:      Thyroid: No thyromegaly  Pulmonary:      Effort: Pulmonary effort is normal  No respiratory distress  Skin:     Findings: No rash (visible)  Neurological:      Mental Status: She is alert and oriented to person, place, and time     Psychiatric:         Behavior: Behavior normal

## 2022-08-22 NOTE — PROGRESS NOTES
Bariatric Nutrition Assessment Note    Type of surgery    Preop  Surgery Date: TBD  6 months   Surgeon: Dr Bertha Laureano  36 y o   female     Wt with BMI of 25: 133 4 lb  Pre-Op Excess Wt: 182 25  /90 (BP Location: Left arm, Patient Position: Sitting, Cuff Size: Large) Comment (BP Location): forearm  Pulse 83   Temp (!) 97 1 °F (36 2 °C) (Tympanic)   Ht 5' 1 5" (1 562 m)   Wt (!) 143 kg (315 lb 8 oz)   SpO2 97%   BMI 58 65 kg/m²      [facility]  Equation:    Estimated calories for weight loss 8276-8664 ( 1-2# per wk wt loss - sedentary )  Estimated protein needs 73-91 g (1 2-1 5 gms/kg IBW )   Estimated fluid needs 2118 mL/70 oz (35 ml/kg IBW )      Weight History   Onset of Obesity: Childhood -has increased since birth of luz  Family history of obesity: Yes-parents  Wt Loss Attempts: Exercise  Nutrition Counseling with RD  OTC meds/supplements  Weight watchers  Patient has tried the above for 6 months or more with insufficient weight loss or weight regain, which is why patient has requested to be evaluated for weight loss surgery today  Maximum Wt Lost: 60 lbs      Review of History and Medications   No past medical history on file    Past Surgical History:   Procedure Laterality Date     SECTION       Social History     Socioeconomic History    Marital status: /Civil Union     Spouse name: Not on file    Number of children: Not on file    Years of education: Not on file    Highest education level: Not on file   Occupational History    Not on file   Tobacco Use    Smoking status: Former Smoker     Packs/day: 0 25     Years: 10 00     Pack years: 2 50     Types: Cigarettes     Quit date: 2013     Years since quittin 1    Smokeless tobacco: Never Used   Vaping Use    Vaping Use: Never used   Substance and Sexual Activity    Alcohol use: Yes     Comment: Social    Drug use: Never    Sexual activity: Not on file   Other Topics Concern    Not on file   Social History Narrative    Not on file     Social Determinants of Health     Financial Resource Strain: Not on file   Food Insecurity: Not on file   Transportation Needs: Not on file   Physical Activity: Not on file   Stress: Not on file   Social Connections: Not on file   Intimate Partner Violence: Not on file   Housing Stability: Not on file       Current Outpatient Medications:     buPROPion (WELLBUTRIN XL) 150 mg 24 hr tablet, Take 150 mg by mouth every morning, Disp: , Rfl:     levonorgestrel (MIRENA) 20 MCG/24HR IUD, by Intrauterine route, Disp: , Rfl:     levothyroxine 50 mcg tablet, Take 1 tablet daily and take 2 pills Friday, Saturday and Sunday  , Disp: 120 tablet, Rfl: 3    metFORMIN (GLUCOPHAGE-XR) 750 mg 24 hr tablet, Take 1 tablet (750 mg total) by mouth in the morning, Disp: 30 tablet, Rfl: 1    sertraline (ZOLOFT) 25 mg tablet, Take 25 mg by mouth daily (Patient not taking: Reported on 7/19/2022), Disp: , Rfl: 1  Food Intake and Lifestyle Assessment   Food Intake Assessment completed via usual diet recall  Breakfast: 8 am premier protein shake, 12 oz coffee with two splenda and 1 creamer  Snack: 2x/week 11 am yogurt, fruit  Lunch: 12 pm salad, hot dog, whatever is convenient  Snack: occasionally- 4 pm no sugar added cup of fruit, or yogurt  Dinner: 5:30 leonel roast with potatoes (5 baby potatoes), corn  Chicken  Spaghetti   Snack: 0  Beverage intake: water and trina tea flavoring or another water flavor  Protein supplement: premier protein 1/day  Estimated protein intake per day: >68 g/d  Estimated fluid intake per day: >64 oz/d, has 32 oz water bottle refilled twice  Meals eaten away from home: minimal, maybe once in a great while  Typical meal pattern: 3 meals per day and 0-1 snacks per day  Eating Behaviors: Large portion sizes and Craves sweet foods  Food allergies or intolerances: No Known Allergies  Cultural or Faith considerations: No    Physical Assessment  Physical Activity  Types of exercise: Walking at lunch when weather permits  mowing lawn  Current physical limitations: joint pain    Psychosocial Assessment   Support systems: spouse  Socioeconomic factors: Lives a busy lifestyle between working full time and caring for daughter, leading to variable/infrequent eating habits  Struggles with money  Specifically mentioned in regards to eating out and being able to pay for surgery  Nutrition Diagnosis  Diagnosis: Overweight / Obesity (NC-3 3)  Related to: Physical inactivity and Excessive energy intake  As Evidenced by: BMI >25 and Unintentional weight gain     Nutrition Prescription: Recommend the following diet  1700 calories per day, 85 gm protein per day, 190 gm carbohydrate, 66 gm fat, 70 oz fluid per day, 20 gm fiber per day  Interventions and Teaching   Discussed pre-op and post-op nutrition guidelines  Patient educated and handouts provided    Capacity of post-surgery stomach  Diet progression  Adequate hydration  Sugar and fat restriction to decrease "dumping syndrome"  Fat restriction to decrease steatorrhea  Expected weight loss  Weight loss plateaus/ possibility of weight regain  Exercise  Suggestions for pre-op diet  Nutrition considerations after surgery  Protein supplements  Meal planning and preparation  Appropriate carbohydrate, protein, and fat intake, and food/fluid choices to maximize safe weight loss, nutrient intake, and tolerance   Dietary and lifestyle changes  Possible problems with poor eating habits  Intuitive eating  Techniques for self monitoring and keeping daily food journal  Potential for food intolerance after surgery, and ways to deal with them including: lactose intolerance, nausea, reflux, vomiting, diarrhea, food intolerance, appetite changes, gas  Vitamin / Mineral supplementation of Multivitamin with minerals, Calcium, Vitamin B12, Iron and Vitamin D    Patient is not currently pregnant and doesn't desire to become pregnant a minimum of one year post-op-pt has Mirena IUD    Education provided to: patient    Barriers to learning: No barriers identified    Readiness to change: contemplation    Prior research on procedure: discussed with provider and friends or family    Comprehension: verbalizes understanding     Expected Compliance: good  Recommendations  Pt is an appropriate candidate for surgery   Yes  Pt should make the following changes and then be reassessed for weight loss surgery: NA    Evaluation / Monitoring  Dietitian to Monitor: Eating pattern as discussed Tolerance of nutrition prescription Body weight Lab values Physical activity Bowel pattern    Goals  Eliminate sugar sweetened beverages, Food journal, Complete lession plans 1-6, Eat 3 meals per day and Eliminate mindless snacking   Take 2000 IU vit D along with multivitamin    Time Spent:   1 Hour

## 2022-08-23 ENCOUNTER — OFFICE VISIT (OUTPATIENT)
Dept: BARIATRICS | Facility: CLINIC | Age: 42
End: 2022-08-23

## 2022-08-23 VITALS
OXYGEN SATURATION: 97 % | HEIGHT: 62 IN | DIASTOLIC BLOOD PRESSURE: 90 MMHG | HEART RATE: 83 BPM | BODY MASS INDEX: 53.92 KG/M2 | SYSTOLIC BLOOD PRESSURE: 130 MMHG | WEIGHT: 293 LBS | TEMPERATURE: 97.1 F

## 2022-08-23 DIAGNOSIS — E66.01 OBESITY, CLASS III, BMI 40-49.9 (MORBID OBESITY) (HCC): Primary | ICD-10-CM

## 2022-08-23 DIAGNOSIS — E66.01 MORBID OBESITY (HCC): Primary | ICD-10-CM

## 2022-08-23 PROCEDURE — RECHECK

## 2022-08-23 RX ORDER — BUPROPION HYDROCHLORIDE 150 MG/1
150 TABLET ORAL EVERY 24 HOURS
COMMUNITY
Start: 2022-06-02

## 2022-08-23 NOTE — PATIENT INSTRUCTIONS
Goals   Keep food log   Recommend 2000 IU of vitamin D3 and one Flinestones complete    Bring your book to appointment in October with dietitian

## 2022-08-23 NOTE — PROGRESS NOTES
Bariatric Behavioral Health Evaluation    Presenting Problem:  Yola Haddad  is a 36 y o    female    :  1980   Patient presented with overall concerns of obesity  Stated that weight has impacted quality of life and concerned with lack of mobility, chronic pain, and overall health  Has attempted various weight loss plans in the past    Patient is Interested in exploring bariatric surgery as an option for  weight loss goals to improve health, increase activity and prevent family disease  Is the patient seeking Bariatric Surgery Eval? Yes  Realizes Post- Op Requirements? Yes  Pre-morbid level of function and history of present illness: Patient has health issues  Psychiatric/Psychological Treatment Diagnosis: Patient reports Mental Health diagnosis of Anxiety and Depression  Currently on Wellbutrin  Prescribed by her PCP at this time  Symptoms are stable at this time  Encouraged to discuss medication with practitioner post surgery  Outpatient Counselor no    Psychiatrist no    Have you had Inpatient Treatment? no    Risk Assessment: Patient denies any SI/HI, no audio or visual hallucination    Observation: This interview only  Access to weapons : no  Drug and/or Alcohol treatment history: no   Tobacco History: no stopped three months ago       Domestic Violence No    Abuse History: no       Physical/Psychological Assessment:     Appearance: appropriate  Sociability: average  Affect: appropriate  Mood: calm  Thought Process: coherent  Speech: normal  Content: no impairment  Orientation: person  Yes, place  Yes, time  Yes, normal attention span  Yes, normal memory  Yes   and normal judgement  Yes   Insight: emotional  good      Family constellation: The patient resides with her spouse and drt  Cooking is alternated she is aware she  craves sweets, but working on being more observant of what she is eating   This is the heaviest she has been in a long time and now that she has her thyroid under control she still has not been able to lose the weight  Family hx of MH/Substance abuse/medical : none reported     Work and work hours:  7:30-5 for an [employer] as an      Activity:  Mows the lawn     Additional comments/stressors related to family/relationships/peer support:  Family is supportive of her surgery  Finances are a concern  Review  Educated and handouts provided  Adequate hydration  Expected weight loss  Weight loss plateaus/ possibility of weight regain  Exercise  Nutrition considerations after surgery  Meal planning and preparation  Dietary and lifestyle changes  Possible problems with poor eating habits  Techniques for self monitoring and keeping daily food journal  Workflow        Recommendations: Recommended for surgery  yes     Note :  Patient presented for behavioral health evaluation for the bariatric program  Patient has Axis I diagnosis and treatment  Patient educated regarding the impact of nicotine and alcohol on the post bariatric surgery patient  Discussed preventing pregnancy for 18 months after bariatric surgery  Patient has a positive family history of obesity  Workflow reviewed  Patient meets criteria  for surgery at this program and is referred to the physician  The patient explained she was referred by her practitioner and was looking for options as she has been trying hard to lose weight and feels that all the diets and exercise activities she has tries have not worked  She knows acquaintances who have had the surgery and has heard different feedback from them  The patient explained she is currently not sure if she is pursuing to have the surgery, but was open to exploring what it could offer as she has tried hard to lose  the weight and has not been successful         Next appointment: 9/21/22 Dr Tiffany ALVA W   L S W   _____________________________      BARIATRIC SURGERY EDUCATION CHECKLIST     I have received education related to my bariatric surgery process and understand:     Patients may be required to complete a psychiatric evaluation and receive clearance for surgery from their psychiatrist      Patients who undergo weight loss surgery are at higher risk of increased mental health concerns and suicide attempts  Patients may be required to complete a full substance abuse evaluation and then complete all treatment recommendations prior to surgery  If diagnosis of abuse/dependence results, patient may be required to remain sober for one (1) year before having bariatric surgery  Patients on psychiatric medications should check with their provider to discuss psychiatric medications and the changes in absorption  Patient should discuss all time release medications with provider and take all medications as prescribed  The recommendation is that there is no use of  any tobacco products, Hookah or  vapes for the bariatric post-operation patient  Bariatric surgery patients should not consume alcohol as a post-operative patient as it may increase risk of numerous health conditions including but not limited to alcohol abuse and ulcers  There is a possibility of weight regain if patient does not follow all program guidelines and recommendations  Bariatric surgery patients should exercise thirty (30) to sixty (60) minutes per day to maintain post-surgical weight loss  Research indicates that bariatric patients are more successful when they see a therapist for up to two (2) years post-op  Patients will follow all medical and dietary recommendations provided  Patient will keep all scheduled appointments and follow up with their physician for a minimum of five (5) years  Patient will take all vitamins as recommended  Post-operative vitamins are life-long  Patient reviewed Bariatric Surgery Education Checklist and agrees they have received education on these issues

## 2022-09-07 DIAGNOSIS — R73.03 PREDIABETES: ICD-10-CM

## 2022-09-07 DIAGNOSIS — E66.01 CLASS 3 SEVERE OBESITY DUE TO EXCESS CALORIES WITHOUT SERIOUS COMORBIDITY WITH BODY MASS INDEX (BMI) OF 50.0 TO 59.9 IN ADULT (HCC): ICD-10-CM

## 2022-09-07 RX ORDER — METFORMIN HYDROCHLORIDE 750 MG/1
TABLET, EXTENDED RELEASE ORAL
Qty: 30 TABLET | Refills: 1 | Status: SHIPPED | OUTPATIENT
Start: 2022-09-07

## 2022-09-21 ENCOUNTER — OFFICE VISIT (OUTPATIENT)
Dept: BARIATRICS | Facility: CLINIC | Age: 42
End: 2022-09-21
Payer: COMMERCIAL

## 2022-09-21 VITALS
DIASTOLIC BLOOD PRESSURE: 76 MMHG | WEIGHT: 293 LBS | HEIGHT: 62 IN | SYSTOLIC BLOOD PRESSURE: 130 MMHG | BODY MASS INDEX: 53.92 KG/M2 | TEMPERATURE: 97.8 F | HEART RATE: 94 BPM

## 2022-09-21 DIAGNOSIS — E66.01 MORBID (SEVERE) OBESITY DUE TO EXCESS CALORIES (HCC): Primary | ICD-10-CM

## 2022-09-21 PROCEDURE — 99203 OFFICE O/P NEW LOW 30 MIN: CPT | Performed by: SURGERY

## 2022-09-21 RX ORDER — METFORMIN HYDROCHLORIDE 750 MG/1
TABLET, EXTENDED RELEASE ORAL EVERY 24 HOURS
COMMUNITY

## 2022-09-21 RX ORDER — BUPROPION HYDROCHLORIDE 150 MG/1
TABLET ORAL EVERY 24 HOURS
COMMUNITY

## 2022-09-21 NOTE — PROGRESS NOTES
BARIATRIC CONSULT-INITIAL - BARIATRIC SURGERY  Monalisa Daugherty 36 y o  female MRN: [id number]  Unit/Bed#:  Encounter: [id number]      HPI:  Monalisa Daugherty is a 36 y o  female who presents with morbid obesity to discuss weight loss options  Review of Systems    Historical Information   Past Medical History:   Diagnosis Date    Anxiety     Hashimoto's thyroiditis     Hyperlipidemia     Obesity      Past Surgical History:   Procedure Laterality Date     SECTION      WISDOM TOOTH EXTRACTION       Social History   Social History     Substance and Sexual Activity   Alcohol Use Yes    Comment: Social     Social History     Substance and Sexual Activity   Drug Use Never     Social History     Tobacco Use   Smoking Status Former Smoker    Packs/day: 0 25    Years: 10 00    Pack years: 2 50    Types: Cigarettes    Quit date: 2013    Years since quittin 2   Smokeless Tobacco Never Used     Family History: non-contributory    Meds/Allergies   all medications and allergies reviewed  No Known Allergies    Objective       Current Vitals:   Blood Pressure: 130/76 (22)  Pulse: 94 (22)  Temperature: 97 8 °F (36 6 °C) (22)  Temp Source: Tympanic (22)  Height: 5' 1 5" (156 2 cm) (22)  Weight - Scale: (!) 143 kg (315 lb 8 oz) (22)  Body mass index is 58 65 kg/m²  Invasive Devices  Report    None                 Physical Exam    Lab Results: I have personally reviewed pertinent lab results  Imaging: I have personally reviewed pertinent reports  EKG, Pathology, and Other Studies: I have personally reviewed pertinent reports  Code Status: [unfilled]  Advance Directive and Living Will:      Power of :    POLST:      Assessment/PLAN:            Patient has a long history of morbid obesity and is presenting to discuss the surgical weight loss options   Despite the patient best efforts patient was unable to lose any meaningful or sustainable weight using nonsurgical means  We had a long discussion regarding all the surgical weight-loss options at our disposal at this point and reviewed the risks and benefits of each procedure in details as it relates to her age, BMI and medical conditions  Patient elected to undergo AARON in one stage or two stages vs RYGB    Risks and benefits were explained to the patient  We also discussed the importance and need of a preoperative workup to make sure that the patient can undergo the procedure safely  Preoperative workup includes sleep apnea screening, cardiac evaluation, nutrition/psych and preoperative EGD  Risks and benefits of all the preoperative diagnostic tests were discussed with the patient including but not limited to the upper endoscopy  Alternatives to surgery and alternative forms of surgery were also explained  Postsurgical commitment and aftercare programs were discussed and explained to the patient in details   In terms of comorbidities patient suffers mostly of   Past Medical History:   Diagnosis Date    Anxiety     Hashimoto's thyroiditis     Hyperlipidemia     Obesity        I informed the patient that the rate of resolution of comorbid conditions following weight loss surgery is between 60 and 90% depending on the severity of the specific medical condition  I discussed and educated the patient regarding the different components of our multidisciplinary program and the importance of compliance and follow-up in the postoperative period  All questions answered  Patient understands risks and benefits  An image of the procedure was also shown to the patient  After showing the image we discussed all the technical aspects of the procedure and also the potential complications including but not limited to gastrointestinal perforation, leak, obstruction, stricture and hemorrhage   I spent 30 min with the patient more than 50% of the time was spent educating the patient and coordinating care

## 2022-10-21 ENCOUNTER — OFFICE VISIT (OUTPATIENT)
Dept: BARIATRICS | Facility: CLINIC | Age: 42
End: 2022-10-21

## 2022-10-21 VITALS — WEIGHT: 293 LBS | BODY MASS INDEX: 53.92 KG/M2 | HEIGHT: 62 IN

## 2022-10-21 DIAGNOSIS — E66.01 CLASS 3 SEVERE OBESITY DUE TO EXCESS CALORIES WITHOUT SERIOUS COMORBIDITY WITH BODY MASS INDEX (BMI) OF 50.0 TO 59.9 IN ADULT (HCC): Primary | ICD-10-CM

## 2022-10-21 PROCEDURE — RECHECK: Performed by: DIETITIAN, REGISTERED

## 2022-10-21 NOTE — PROGRESS NOTES
Bariatric Nutrition Assessment Note    Type of surgery    Preop  Surgery Date: TBD- pt has  6 months program requirement   Today is / of program   Surgeon: Dr Hazel Rojas  36 y o   female     Wt with BMI of 25: 133 4 lb  Pre-Op Excess Wt: 182 25  There were no vitals taken for this visit       Wt Readings from Last 3 Encounters:   22 (!) 143 kg (315 lb 8 oz)   22 (!) 143 kg (315 lb 8 oz)   22 (!) 145 kg (319 lb 3 2 oz)       [place]- [place] Equation:    Estimated calories for weight loss 1107-4138 ( 1-2# per wk wt loss - sedentary )  Estimated protein needs 73-91 g (1 2-1 5 gms/kg IBW )   Estimated fluid needs 2118 mL/70 oz (35 ml/kg IBW )      Weight History   Onset of Obesity: Childhood -has increased since birth of luz  Family history of obesity: Yes-parents  Wt Loss Attempts: Exercise  Nutrition Counseling with RD  OTC meds/supplements  Weight watchers  Patient has tried the above for 6 months or more with insufficient weight loss or weight regain, which is why patient has requested to be evaluated for weight loss surgery today  Maximum Wt Lost: 60 lbs      Review of History and Medications   Past Medical History:   Diagnosis Date   • Anxiety    • Hashimoto's thyroiditis    • Hyperlipidemia    • Obesity      Past Surgical History:   Procedure Laterality Date   •  SECTION     • WISDOM TOOTH EXTRACTION       Social History     Socioeconomic History   • Marital status: /Civil Union     Spouse name: Not on file   • Number of children: Not on file   • Years of education: Not on file   • Highest education level: Not on file   Occupational History   • Not on file   Tobacco Use   • Smoking status: Former Smoker     Packs/day: 0 25     Years: 10 00     Pack years: 2 50     Types: Cigarettes     Quit date: 2013     Years since quittin 3   • Smokeless tobacco: Never Used   Vaping Use   • Vaping Use: Never used   Substance and Sexual Activity   • Alcohol use: Yes     Comment: Social   • Drug use: Never   • Sexual activity: Not on file   Other Topics Concern   • Not on file   Social History Narrative   • Not on file     Social Determinants of Health     Financial Resource Strain: Not on file   Food Insecurity: Not on file   Transportation Needs: Not on file   Physical Activity: Not on file   Stress: Not on file   Social Connections: Not on file   Intimate Partner Violence: Not on file   Housing Stability: Not on file       Current Outpatient Medications:   •  buPROPion (WELLBUTRIN XL) 150 mg 24 hr tablet, Take 150 mg by mouth every morning, Disp: , Rfl:   •  buPROPion (WELLBUTRIN XL) 150 mg 24 hr tablet, Take 150 mg by mouth every 24 hours, Disp: , Rfl:   •  buPROPion (WELLBUTRIN XL) 150 mg 24 hr tablet, every 24 hours, Disp: , Rfl:   •  levonorgestrel (MIRENA) 20 MCG/24HR IUD, by Intrauterine route, Disp: , Rfl:   •  levothyroxine 50 mcg tablet, Take 1 tablet daily and take 2 pills Friday, Saturday and Sunday  , Disp: 120 tablet, Rfl: 3  •  metFORMIN (GLUCOPHAGE-XR) 750 mg 24 hr tablet, TAKE 1 TABLET BY MOUTH IN THE MORNING, Disp: 30 tablet, Rfl: 1  •  metFORMIN (GLUCOPHAGE-XR) 750 mg 24 hr tablet, every 24 hours, Disp: , Rfl:   •  sertraline (ZOLOFT) 25 mg tablet, Take 25 mg by mouth daily (Patient not taking: No sig reported), Disp: , Rfl: 1  Food Intake and Lifestyle Assessment   Food Intake Assessment completed via usual diet recall  Breakfast: 8 am premier protein shake, 12 oz coffee with two splenda and 1 creamer  Snack: 11 am , fruit  Lunch: 12 premier and one serving of baby carrots with 80 calories [place] Yogurt   Snack: 3 pm Quest bars   Dinner: 5:30 leonel roast with potatoes (5 baby potatoes), corn  Chicken or other lean protein   Snack: occasional snacking - mini PB cup   Beverage intake: water and trina tea flavoring or another water flavor  Protein supplement: premier protein 1/day  Estimated protein intake per day: >68 g/d  Estimated fluid intake per day: >55 oz/d, has 32 oz water bottle refilled twice  Meals eaten away from home: minimal, maybe once in a great while  Typical meal pattern: 3 meals per day and 0-1 snacks per day  Eating Behaviors: Large portion sizes and Craves sweet foods  Food allergies or intolerances: No Known Allergies  Cultural or Denominational considerations: No    Physical Assessment  Physical Activity  Types of exercise: Walking at lunch when weather permits- 30 minutes three times per week   Yard work - mowing lawn   Current physical limitations: joint pain    Psychosocial Assessment   Support systems: spouse  Socioeconomic factors: Lives a busy lifestyle between working full time and caring for daughter, leading to variable/infrequent eating habits  Struggles with money  Specifically mentioned in regards to eating out and being able to pay for surgery  Nutrition Diagnosis-continued   Diagnosis: Overweight / Obesity (NC-3 3)  Related to: Physical inactivity and Excessive energy intake  As Evidenced by: BMI >25 and Unintentional weight gain     Nutrition Prescription: Recommend the following diet  1700 calories per day, 85 gm protein per day, 190 gm carbohydrate, 66 gm fat, 70 oz fluid per day, 20 gm fiber per day  Interventions and Teaching   Discussed pre-op and post-op nutrition guidelines  Patient educated and handouts provided    Capacity of post-surgery stomach  Diet progression  Adequate hydration  Sugar and fat restriction to decrease "dumping syndrome"  Fat restriction to decrease steatorrhea  Expected weight loss  Weight loss plateaus/ possibility of weight regain  Exercise  Suggestions for pre-op diet  Nutrition considerations after surgery  Protein supplements  Meal planning and preparation  Appropriate carbohydrate, protein, and fat intake, and food/fluid choices to maximize safe weight loss, nutrient intake, and tolerance   Dietary and lifestyle changes  Possible problems with poor eating habits  Intuitive eating  Techniques for self monitoring and keeping daily food journal  Potential for food intolerance after surgery, and ways to deal with them including: lactose intolerance, nausea, reflux, vomiting, diarrhea, food intolerance, appetite changes, gas  Vitamin / Mineral supplementation of Multivitamin with minerals, Calcium, Vitamin B12, Iron and Vitamin D    Patient is not currently pregnant and doesn't desire to become pregnant a minimum of one year post-op-pt has Mirena IUD    Education provided to: patient    Barriers to learning: No barriers identified    Readiness to change: contemplation    Prior research on procedure: discussed with provider and friends or family    Comprehension: verbalizes understanding     Expected Compliance: good    Workflow:  • Psych and/or D+A Clearance: pending  • PCP Letter: no  • Support Group: no  • Surgeon Appt  done  • EGD ordered   • Cardiac Risk Assessment referral placed   • Sleep Studies referral placed   • Blood work ordered   • Nicotine test previous smoker   • 6 Month Pre-Operative Program: 2/6  • Weight Loss : goal 303# end of program  • Goal: 287 3 # day of surgery       Recommendations  Pt is an appropriate candidate for surgery  Yes  Pt should make the following changes and then be reassessed for weight loss surgery: 10% wt loss requirement for BMI >55    Evaluation / Monitoring  Dietitian to Monitor: Eating pattern as discussed Tolerance of nutrition prescription Body weight Lab values Physical activity Bowel pattern  Pt is taking vitamin D3 and one flinestones complete per day in preparation for surgery  She is drinking 2 shakes per day and 2 small snacks ( 150-200 calories ) and one sensible meal ( lean protein/ veggies and starch) for dinner   She lost pounds over the past month following the above meal plan  She is reading her book and completing her lesson plans       Goals  Eliminate sugar sweetened beverages, Food journal, Complete lession plans 1-6, Eat 3 meals per day and Eliminate mindless snacking   Continue with following meal plan    B  Shake    S sugar free fruit   L  Shake, plus 80 calories [place] Yogurt and one serving baby carrots    S;  Quest bar   D  - lean protein, vegetables and starch   Continue to work on increasing water intake to 64 ounces per day     Time Spent:   30 minutes

## 2022-10-26 LAB
EST. AVERAGE GLUCOSE BLD GHB EST-MCNC: 114 MG/DL
EST. AVERAGE GLUCOSE BLD GHB EST-SCNC: 6.3 MMOL/L
HBA1C MFR BLD: 5.6 % OF TOTAL HGB
T4 FREE SERPL-MCNC: 1.1 NG/DL (ref 0.8–1.8)
TSH SERPL-ACNC: 1.36 MIU/L

## 2022-11-10 DIAGNOSIS — R73.03 PREDIABETES: ICD-10-CM

## 2022-11-10 DIAGNOSIS — E66.01 CLASS 3 SEVERE OBESITY DUE TO EXCESS CALORIES WITHOUT SERIOUS COMORBIDITY WITH BODY MASS INDEX (BMI) OF 50.0 TO 59.9 IN ADULT (HCC): ICD-10-CM

## 2022-11-10 RX ORDER — METFORMIN HYDROCHLORIDE 750 MG/1
TABLET, EXTENDED RELEASE ORAL
Qty: 30 TABLET | Refills: 1 | Status: SHIPPED | OUTPATIENT
Start: 2022-11-10

## 2022-11-19 DIAGNOSIS — E66.01 CLASS 3 SEVERE OBESITY DUE TO EXCESS CALORIES WITHOUT SERIOUS COMORBIDITY WITH BODY MASS INDEX (BMI) OF 50.0 TO 59.9 IN ADULT (HCC): ICD-10-CM

## 2022-11-19 DIAGNOSIS — R73.03 PREDIABETES: ICD-10-CM

## 2022-11-21 RX ORDER — METFORMIN HYDROCHLORIDE 750 MG/1
TABLET, EXTENDED RELEASE ORAL
Qty: 30 TABLET | Refills: 1 | Status: SHIPPED | OUTPATIENT
Start: 2022-11-21

## 2022-11-21 NOTE — PROGRESS NOTES
Name           Mari Fiore      3/6 weight check  Starting weight 315 5  Last time weight 308 6  Today's weight 307 3      Surgery month:  March/April  Surgery deadline: July  Surgeon: Dr Shahab Man Follow Up Note:        Mental health and wellness - Patient presents to the office today  for a weight check and support visit  The pt shared she has a measuring plate for measuring her food  Support systems are his co-workers  Eating behaviors - Two protein drinks one for breakfast and one for lunch  Snacks in between  Weights food  Hydration-one cup coffee 34-44 oz of water  Discussed ways to remind the pt to drink water  Activity -  Walking at lunch time  1/2 an hour       Progress toward program requirements    Workflow:  • Psych and/or D+A Clearance: done  • PCP Letter: pending  • Support Group:pending  • Surgeon Appt  done  • EGD ordered   • Cardiac Risk Assessment referral placed   • Sleep Studies pending 12/20/22  • Blood work ordered   • Nicotine test previous smoker      Reviewed and discussed  Adequate hydration  Exercise  Meal planning and preparation  Lifestyle changes  Possible problems with poor eating habits  Techniques for self monitoring    Goal: 1--follow up on PCP referral 2-attend support class 3-schedule  cardio    Next appointment: 12/21/22 RD

## 2022-11-23 ENCOUNTER — OFFICE VISIT (OUTPATIENT)
Dept: BARIATRICS | Facility: CLINIC | Age: 42
End: 2022-11-23

## 2022-11-23 ENCOUNTER — OFFICE VISIT (OUTPATIENT)
Dept: ENDOCRINOLOGY | Facility: HOSPITAL | Age: 42
End: 2022-11-23

## 2022-11-23 VITALS — WEIGHT: 293 LBS | BODY MASS INDEX: 57.12 KG/M2

## 2022-11-23 VITALS — HEIGHT: 62 IN | HEART RATE: 92 BPM | WEIGHT: 293 LBS | BODY MASS INDEX: 53.92 KG/M2

## 2022-11-23 DIAGNOSIS — E06.3 HYPOTHYROIDISM DUE TO HASHIMOTO'S THYROIDITIS: Primary | ICD-10-CM

## 2022-11-23 DIAGNOSIS — E66.01 MORBID (SEVERE) OBESITY DUE TO EXCESS CALORIES (HCC): Primary | ICD-10-CM

## 2022-11-23 DIAGNOSIS — E03.8 HYPOTHYROIDISM DUE TO HASHIMOTO'S THYROIDITIS: Primary | ICD-10-CM

## 2022-11-23 DIAGNOSIS — R73.03 PRE-DIABETES: ICD-10-CM

## 2022-11-23 DIAGNOSIS — E66.01 CLASS 3 SEVERE OBESITY DUE TO EXCESS CALORIES WITHOUT SERIOUS COMORBIDITY WITH BODY MASS INDEX (BMI) OF 50.0 TO 59.9 IN ADULT (HCC): ICD-10-CM

## 2022-11-23 RX ORDER — LEVOTHYROXINE SODIUM 0.05 MG/1
TABLET ORAL
Qty: 120 TABLET | Refills: 3 | Status: SHIPPED | OUTPATIENT
Start: 2022-11-23

## 2022-11-23 NOTE — PROGRESS NOTES
Mary Mora 36 y o  female MRN: [id number]    Encounter: [id number]      Assessment/Plan     Assessment: This is a 36y o -year-old female with hypothyroidism due to Hashimoto's thyroiditis, pre diabetes and obesity      Plan:  1  Thyromegaly:  She denies any neck compressive symptoms at this time   Her most recent ultrasound of the thyroid showed a heterogeneous thyroid with no discrete nodules      2  Hypothyroidism secondary to Hashimoto's thyroiditis:  She complains of some fatigue   Her most recent TSH is normal   For now, continue levothyroxine at current dose    Recheck TSH and free T4 prior to next office visit       3  Weight gain and history of prediabetes: Her hemoglobin A1c hemoglobin A1c was 5  6   Discussed the importance of diet and regular exercise  She is continuing to following up with [facility] weight management at length during the time of the visit  Check hemoglobin A1c and comprehensive metabolic panel prior to next visit  CC: Hypothyroidism due to Hashimoto's thyroiditis    History of Present Illness     HPI:  41 y  o  female for follow-up of Hashimoto's thyroiditis, pre diabetes and obesity   She reports thyromegaly and thyroid dysfunction was found after pregnancy in the setting of uncontrolled weight gain  She takes levothyroxine 50 mcg daily Monday through Thursday with 2 tablets on Friday, Saturday and Sunday consistently and appropriately  [facility] most recent TSH from October 26, 2022 was 1 36 with a free T4 of 1 1  [facility] most recent thyroid ultrasound from June 5, 2018 shows a grossly stable thyroid heterogeneous in nature with no discrete nodules  Overall, she notes she feels okay  She does report fatigue  Betsey Ghotra is concerned about her difficulty with weight loss   She also had diagnosis of prediabetes in 2018    She has recently followed up with weight management   Her hemoglobin A1c performed on October 26, 2022 is 5 6   She denies any polydipsia, polyuria or polyphagia  Betsey Ghotra denies family history of thyroid cancer, but does note her mother had an overactive thyroid had her thyroid removed  She denies any anterior neck discomfort, dysphagia or dysphonia      Review of Systems   Constitutional: Positive for fatigue  Negative for chills and fever  HENT: Negative  Negative for trouble swallowing and voice change  Eyes: Negative  Negative for photophobia, pain, discharge, redness, itching and visual disturbance  Respiratory: Negative  Negative for chest tightness and shortness of breath  Cardiovascular: Negative  Negative for chest pain  Gastrointestinal: Negative  Negative for abdominal pain, constipation, diarrhea and vomiting  Endocrine: Negative for cold intolerance, heat intolerance, polydipsia, polyphagia and polyuria  Genitourinary: Negative  Musculoskeletal: Positive for arthralgias (Bilateral knees)  Skin: Negative  Allergic/Immunologic: Negative  Neurological: Negative  Negative for dizziness, syncope, light-headedness and headaches  Hematological: Negative  Psychiatric/Behavioral: Negative  All other systems reviewed and are negative        Historical Information   Past Medical History:   Diagnosis Date   • Anxiety    • Hashimoto's thyroiditis    • Hyperlipidemia    • Obesity      Past Surgical History:   Procedure Laterality Date   •  SECTION     • WISDOM TOOTH EXTRACTION       Social History   Social History     Substance and Sexual Activity   Alcohol Use Yes    Comment: Social     Social History     Substance and Sexual Activity   Drug Use Never     Social History     Tobacco Use   Smoking Status Former   • Packs/day: 0 25   • Years: 10 00   • Pack years: 2 50   • Types: Cigarettes   • Quit date: 2013   • Years since quittin 3   Smokeless Tobacco Never     Family History:   Family History   Problem Relation Age of Onset   • Hypothyroidism Mother    • Lung cancer Mother    • Hyperlipidemia Father    • Hypertension Father • Diabetes type II Paternal Grandmother    • Diabetes type II Maternal Aunt    • Diabetes type II Maternal Uncle    • Diabetes type II Paternal Uncle        Meds/Allergies   Current Outpatient Medications   Medication Sig Dispense Refill   • buPROPion (WELLBUTRIN XL) 150 mg 24 hr tablet Take 150 mg by mouth every morning     • buPROPion (WELLBUTRIN XL) 150 mg 24 hr tablet Take 150 mg by mouth every 24 hours     • buPROPion (WELLBUTRIN XL) 150 mg 24 hr tablet every 24 hours     • levonorgestrel (MIRENA) 20 MCG/24HR IUD by Intrauterine route     • levothyroxine 50 mcg tablet Take 1 tablet daily and take 2 pills Friday, Saturday and Sunday  120 tablet 3   • metFORMIN (GLUCOPHAGE-XR) 750 mg 24 hr tablet every 24 hours     • metFORMIN (GLUCOPHAGE-XR) 750 mg 24 hr tablet TAKE 1 TABLET BY MOUTH IN THE MORNING 30 tablet 1   • sertraline (ZOLOFT) 25 mg tablet Take 25 mg by mouth daily (Patient not taking: Reported on 7/19/2022)  1     No current facility-administered medications for this visit  No Known Allergies    Objective   Vitals: not currently breastfeeding  Physical Exam  Vitals reviewed  Constitutional:       Appearance: She is well-developed and well-nourished  She is obese  HENT:      Head: Normocephalic and atraumatic  Mouth/Throat:      Mouth: Oropharynx is clear and moist    Eyes:      Extraocular Movements: EOM normal       Conjunctiva/sclera: Conjunctivae normal       Pupils: Pupils are equal, round, and reactive to light  Comments: Wears glasses   Cardiovascular:      Rate and Rhythm: Normal rate and regular rhythm  Pulses: Intact distal pulses  Heart sounds: Normal heart sounds  Pulmonary:      Effort: Pulmonary effort is normal       Breath sounds: Normal breath sounds  Abdominal:      General: Bowel sounds are normal       Palpations: Abdomen is soft  Musculoskeletal:         General: Normal range of motion  Cervical back: Normal range of motion and neck supple  Skin:     General: Skin is warm and dry  Neurological:      Mental Status: She is alert and oriented to person, place, and time  Psychiatric:         Mood and Affect: Mood and affect normal          Behavior: Behavior normal          Thought Content: Thought content normal          Judgment: Judgment normal        Lab Results:   Lab Results   Component Value Date/Time    Free t4 1 1 10/26/2022 07:24 AM    Free t4 1 2 05/09/2022 07:15 AM       Imaging Studies:   Results for orders placed in visit on 06/05/18     thyroid      Portions of the record may have been created with voice recognition software  Occasional wrong word or "sound a like" substitutions may have occurred due to the inherent limitations of voice recognition software  Read the chart carefully and recognize, using context, where substitutions have occurred

## 2022-12-16 ENCOUNTER — PREP FOR PROCEDURE (OUTPATIENT)
Dept: BARIATRICS | Facility: CLINIC | Age: 42
End: 2022-12-16

## 2022-12-16 DIAGNOSIS — Z98.84 BARIATRIC SURGERY STATUS: Primary | ICD-10-CM

## 2022-12-20 ENCOUNTER — OFFICE VISIT (OUTPATIENT)
Dept: SLEEP CENTER | Facility: HOSPITAL | Age: 42
End: 2022-12-20

## 2022-12-20 VITALS
HEIGHT: 62 IN | BODY MASS INDEX: 53.92 KG/M2 | HEART RATE: 93 BPM | DIASTOLIC BLOOD PRESSURE: 80 MMHG | WEIGHT: 293 LBS | SYSTOLIC BLOOD PRESSURE: 130 MMHG | OXYGEN SATURATION: 94 %

## 2022-12-20 DIAGNOSIS — F32.A ANXIETY AND DEPRESSION: ICD-10-CM

## 2022-12-20 DIAGNOSIS — R73.03 PRE-DIABETES: ICD-10-CM

## 2022-12-20 DIAGNOSIS — R51.9 HEADACHE UPON AWAKENING: ICD-10-CM

## 2022-12-20 DIAGNOSIS — R40.0 DAYTIME SLEEPINESS: ICD-10-CM

## 2022-12-20 DIAGNOSIS — G47.33 OSA (OBSTRUCTIVE SLEEP APNEA): Primary | ICD-10-CM

## 2022-12-20 DIAGNOSIS — F41.9 ANXIETY AND DEPRESSION: ICD-10-CM

## 2022-12-20 DIAGNOSIS — E66.01 CLASS 3 SEVERE OBESITY DUE TO EXCESS CALORIES WITHOUT SERIOUS COMORBIDITY WITH BODY MASS INDEX (BMI) OF 50.0 TO 59.9 IN ADULT (HCC): ICD-10-CM

## 2022-12-20 DIAGNOSIS — Z91.89 RISK FACTORS FOR OBSTRUCTIVE SLEEP APNEA: ICD-10-CM

## 2022-12-20 NOTE — PROGRESS NOTES
Review of Systems      Genitourinary none   Cardiology none   Gastrointestinal none   Neurology frequent headaches, awaken with headache, forgetfulness and poor concentration or confusion,    Constitutional none   Integumentary none   Psychiatry anxiety, depression and mood change   Musculoskeletal joint pain   Pulmonary snoring   ENT none   Endocrine none   Hematological none

## 2022-12-20 NOTE — PROGRESS NOTES
Consultation - [facility]  36 y o  female  FUX:[id number]  GAU:[id number]  DOS:12/20/2022    Physician Requesting Consult: Brock Logan PA-C             Reason for Consult : At your kind request I saw Zaynab Ibarra for initial sleep evaluation today  The patient is planning bariatric surgery and is here to evaluate for suspected Obstructive Sleep Apnea  PFSH, Problem List, Medications & Allergies were reviewed in EMR  Saloni Mnauel  has a past medical history of Anxiety, Hashimoto's thyroiditis, Hyperlipidemia, and Obesity  She has a current medication list which includes the following prescription(s): bupropion, levonorgestrel, levothyroxine, metformin, bupropion, bupropion, metformin, and sertraline  HPI: She presents with complaints from her  of loud snoring of several years duration that she is not aware of  There is no report of breathing difficulties during sleep  Other Complaints: "Tired and lack of concentration "    Restless Leg Syndrome: reports no suggestive symptoms  Parasomnia: reports teeth grinding during sleep;   Sleep Routine (on average): Typical Bedtime: 9 PM gets OOB: 6:30 AM TIB:9 5 hrs  Sleep latency:<  30 minutes Sleep Interruptions:infrequent/nite  Awakens: needing an alarm  Upon awakening: is not always refreshed and awakens with headache 2-3 times a week  [She estimates getting 7-8 hrs sleep ] Saloni Manuel reports excessive daytime sleepiness [yawns [excessively]] but is not napping  She rated [herself] at Total score: 3 /24 on the [place] Sleepiness Scale  Habits:  reports that she quit smoking about 9 years ago  Her smoking use included cigarettes  She has a 2 50 pack-year smoking history  She has never used smokeless tobacco ; [  E-Cigarette/Vaping   • E-Cigarette Use Never User    ]; [ reports no history of drug use ];  reports current alcohol use ; Caffeine use:limited; Exercise routine: regular        Family History: Negative for sleep disturbance  ROS - reviewed and as attached  Significant for intentional weight reduction of around 20 pounds in the past few months  She reported no nasal, respiratory or cardiac symptoms  She is on treatment for anxiety and depression  Frutoso Spatz EXAM:  /80 (BP Location: Left arm, Cuff Size: Adult)   Pulse 93   Ht 5' 1 5" (1 562 m)   Wt (!) 140 kg (307 lb 12 8 oz)   LMP  (LMP Unknown)   SpO2 94%   BMI 57 22 kg/m²    General: Well groomed female, well appearing, in no apparent distress  Neurological: Alert and orientated; cooperative; Cranial nerves intact;    Psychiatric: Speech: Clear and coherent; normal mood, affect & thought   Skin: Warm and dry; Color& Hydration good; no facial rashes or lesions   HEENT:  Craniofacial anatomy: normal Sinuses: Non-tender  TMJ: Normal   Eyes: EOM's intact; conjunctiva/corneas clear   Ears: Externally appear normal     Nasal Airway: is patent Septum: Intact; Mucosa: Normal; Turbinates: Normal; Rhinorrhea: None  Mouth: Lips: Normal posture; Dentition: normal   Mucosa: Moist; Hard Palate:normal    Oropharryx: crowded and AP narrowing Tongue: Mallampati:Class IV and MobileSoft Palate:  redundant  Tonsils: present & normal  Neck:[is thick and excess fatty tissue;] [Neck Circumference: 17 5 ";] Supple; no abnormal masses; Thyroid: Normal  Trachea: Central     Lymph: No cervical or submandibular Lymhadenopathy  Heart: S1,S2 normal; RRR; no gallop; no murmur s  Lungs: Respiratory Effort: Normal  Air entry good bilaterally  No wheezes  No rales  Abdomen: Obese, soft & non-tender    Extremities: No pedal edema  No clubbing or cyanosis  Musculoskeletal:  Motor normal; Gait: Normal        CMP has been normal; no recent CBC result    IMPRESSION: Primary/Secondary Sleep Diagnoses (to Medical or Psych conditions) & Comorbidities   1  TAYLOR (obstructive sleep apnea)  Home Study      2  Headache upon awakening  Home Study      3  Daytime sleepiness        4   Anxiety and depression        5  Risk factors for obstructive sleep apnea  Ambulatory Referral to Sleep Medicine      6  Pre-diabetes  Ambulatory Referral to Sleep Medicine      7  Class 3 severe obesity due to excess calories without serious comorbidity with body mass index (BMI) of 50 0 to 59 9 in adult [facility])  Ambulatory Referral to Sleep Medicine           PLAN:  1  With respect to above conditions, comprehensive counseling provided on pathophysiology; effects on symptoms and comorbidities, diagnostic strategies & limitations; treatment options; risks or no treament; risks & benefits of the various therapeutic options; costs and insurance aspects  2  I advised weight reduction, avoiding sleeping supine, using alcohol or sedating medications close to bed time and on safe driving practices  3  Nocturnal polysomnography is indicated and HST will be scheduled  4   Patient is willing to try Positive airway pressure therapy and will be scheduled for a titration study if indicated  5  Follow-up to be scheduled after the studies to review results, further details of treatment options and to initiate/adjust therapy  Sincerely,      Authenticated electronically on 48/82/97   Board Certified Specialist     Portions of the record may have been created with voice recognition software  Occasional wrong word or "sound a like" substitutions may have occurred due to the inherent limitations of voice recognition software  There may also be notations and random deletions of words or characters from malfunctioning software  Read the chart carefully and recognize, using context, where substitutions/deletions have occurred

## 2022-12-20 NOTE — PATIENT INSTRUCTIONS
What is TAYLOR? Obstructive sleep apnea is a common and serious sleep disorder that causes you to stop breathing during sleep  The airway repeatedly becomes blocked, limiting the amount of air that reaches your lungs  When this happens, you may snore loudly or making choking noises as you try to breathe  Your brain and body becomes oxygen deprived and you may wake up  This may happen a few times a night, or in more severe cases, several hundred times a night  Sleep apnea can make you wake up in the morning feeling tired or unrefreshed even though you have had a full night of sleep  During the day, you may feel fatigued, have difficulty concentrating or you may even unintentionally fall asleep  This is because your body is waking up numerous times throughout the night, even though you might not be conscious of each awakening  The lack of oxygen your body receives can have negative long-term consequences for your health  This includes:  High blood pressure  Heart disease  Irregular heart rhythms  Stroke  Pre-diabetes and diabetes  Depression  Testing  An objective evaluation of your sleep may be needed before your board certified sleep physician can make a diagnosis  Options include:   In-lab overnight sleep study  This type of sleep study requires you to stay overnight at a sleep center, in a bed that may resemble a hotel room  You will sleep with sensors hooked up to various parts of your body  These sensors record your brain waves, heartbeat, breathing and movement  An overnight sleep study also provides your doctor with the most complete information about your sleep  Learn more about an overnight sleep study  Home sleep apnea test  Some patients with high risk factors for obstructive sleep apnea and no other medical disorders may be candidates for a home sleep apnea test  The testing equipment differs in that it is less complicated than what is used in an overnight sleep study   As such, does not give all the data an in-lab will and if negative, may not mean you do not have the problem  Treatment for sleep apnea includes using a continuous positive airway pressure (CPAP) machine to keep your airway open during sleep  A mask is placed over your nose and mouth, or just your nose  The mask is hooked to the CPAP machine that blows a gentle stream of air into the mask when you breathe  This helps keep your airway open so you can breathe more regularly  Extra oxygen may be given to you through the machine  You may be given a mouth device  It looks like a mouth guard or dental retainer and stops your tongue and mouth tissues from blocking your throat while you sleep  Surgery may be needed to remove extra tissues that block your mouth, throat, or nose  Manage sleep apnea:   Do not smoke  Nicotine and other chemicals in cigarettes and cigars can cause lung damage  Ask your healthcare provider for information if you currently smoke and need help to quit  E-cigarettes or smokeless tobacco still contain nicotine  Talk to your healthcare provider before you use these products  Do not drink alcohol or take sedative medicine before you go to sleep  Alcohol and sedatives can relax the muscles and tissues around your throat  This can block the airflow to your lungs  Maintain a healthy weight  Excess tissue around your throat may restrict your breathing  Ask your healthcare provider for information if you need to lose weight  Sleep on your side or use pillows designed to prevent sleep apnea  This prevents your tongue or other tissues from blocking your throat  You can also raise the head of your bed  Driving Safety  Refrain from driving when drowsy  Follow up with your healthcare provider as directed: Write down your questions so you remember to ask them during your visits  Go to AASM website for more information: Sleepeducation  org  What is TAYLOR?    Obstructive sleep apnea is a common and serious sleep disorder that causes you to stop breathing during sleep  The airway repeatedly becomes blocked, limiting the amount of air that reaches your lungs  When this happens, you may snore loudly or making choking noises as you try to breathe  Your brain and body becomes oxygen deprived and you may wake up  This may happen a few times a night, or in more severe cases, several hundred times a night  Sleep apnea can make you wake up in the morning feeling tired or unrefreshed even though you have had a full night of sleep  During the day, you may feel fatigued, have difficulty concentrating or you may even unintentionally fall asleep  This is because your body is waking up numerous times throughout the night, even though you might not be conscious of each awakening  The lack of oxygen your body receives can have negative long-term consequences for your health  This includes:  High blood pressure  Heart disease  Irregular heart rhythms  Stroke  Pre-diabetes and diabetes  Depression  Testing  An objective evaluation of your sleep may be needed before your board certified sleep physician can make a diagnosis  Options include:   In-lab overnight sleep study  This type of sleep study requires you to stay overnight at a sleep center, in a bed that may resemble a hotel room  You will sleep with sensors hooked up to various parts of your body  These sensors record your brain waves, heartbeat, breathing and movement  An overnight sleep study also provides your doctor with the most complete information about your sleep  Learn more about an overnight sleep study  Home sleep apnea test  Some patients with high risk factors for obstructive sleep apnea and no other medical disorders may be candidates for a home sleep apnea test  The testing equipment differs in that it is less complicated than what is used in an overnight sleep study  As such, does not give all the data an in-lab will and if negative, may not mean you do not have the problem    Treatment for sleep apnea includes using a continuous positive airway pressure (CPAP) machine to keep your airway open during sleep  A mask is placed over your nose and mouth, or just your nose  The mask is hooked to the CPAP machine that blows a gentle stream of air into the mask when you breathe  This helps keep your airway open so you can breathe more regularly  Extra oxygen may be given to you through the machine  You may be given a mouth device  It looks like a mouth guard or dental retainer and stops your tongue and mouth tissues from blocking your throat while you sleep  Surgery may be needed to remove extra tissues that block your mouth, throat, or nose  Manage sleep apnea:   Do not smoke  Nicotine and other chemicals in cigarettes and cigars can cause lung damage  Ask your healthcare provider for information if you currently smoke and need help to quit  E-cigarettes or smokeless tobacco still contain nicotine  Talk to your healthcare provider before you use these products  Do not drink alcohol or take sedative medicine before you go to sleep  Alcohol and sedatives can relax the muscles and tissues around your throat  This can block the airflow to your lungs  Maintain a healthy weight  Excess tissue around your throat may restrict your breathing  Ask your healthcare provider for information if you need to lose weight  Sleep on your side or use pillows designed to prevent sleep apnea  This prevents your tongue or other tissues from blocking your throat  You can also raise the head of your bed  Driving Safety  Refrain from driving when drowsy  Follow up with your healthcare provider as directed: Write down your questions so you remember to ask them during your visits  Go to AAS website for more information: Sleepeducation  org    Nursing Support:  When: Monday through Friday 7A-5PM except holidays  Where: Our direct line is [phone]  If you are having a true emergency please call 911    In the event that the line is busy or it is after hours please leave a voice message and we will return your call  Please speak clearly, leaving your full name, birth date, best number to reach you and the reason for your call  Medication refills: We will need the name of the medication, the dosage, the ordering provider, whether you get a 30 or 90 day refill, and the pharmacy name and address  Medications will be ordered by the provider only  Nurses cannot call in prescriptions  Please allow 7 days for medication refills  Physician requested updates: If your provider requested that you call with an update after starting medication, please be ready to provide us the medication and dosage, what time you take your medication, the time you attempt to fall asleep, time you fall asleep, when you wake up, and what time you get out of bed  Sleep Study Results: We will contact you with sleep study results and/or next steps after the physician has reviewed your testing

## 2022-12-21 ENCOUNTER — OFFICE VISIT (OUTPATIENT)
Dept: BARIATRICS | Facility: CLINIC | Age: 42
End: 2022-12-21

## 2022-12-21 VITALS — HEIGHT: 62 IN | WEIGHT: 293 LBS | BODY MASS INDEX: 53.92 KG/M2

## 2022-12-21 DIAGNOSIS — Z01.818 PRE-OPERATIVE CLEARANCE: Primary | ICD-10-CM

## 2022-12-21 DIAGNOSIS — R73.03 PREDIABETES: ICD-10-CM

## 2022-12-21 DIAGNOSIS — Z91.89 RISK FACTORS FOR OBSTRUCTIVE SLEEP APNEA: ICD-10-CM

## 2022-12-21 DIAGNOSIS — E66.01 OBESITY, CLASS III, BMI 40-49.9 (MORBID OBESITY) (HCC): ICD-10-CM

## 2022-12-21 DIAGNOSIS — R63.5 ABNORMAL WEIGHT GAIN: ICD-10-CM

## 2022-12-21 DIAGNOSIS — F17.211 CIGARETTE NICOTINE DEPENDENCE IN REMISSION: ICD-10-CM

## 2022-12-21 DIAGNOSIS — F41.9 ANXIETY: ICD-10-CM

## 2022-12-21 NOTE — PROGRESS NOTES
Bariatric Nutrition Assessment Note    Type of surgery    Preop  Surgery Date: TBD- pt has  6 months program requirement   Today is / of program   Surgeon: Dr Enrico Laureano  36 y o   female     Wt with BMI of 25: 133 4 lb  Pre-Op Excess Wt: 182 25  LMP  (LMP Unknown)    Ht 5' 1 5" (1 562 m)   Wt (!) 139 kg (305 lb 9 6 oz)   LMP  (LMP Unknown)   BMI 56 81 kg/m²    Pt lost 2# from last month  She has lost a total of 13 7# since starting the program ( 4% of her body weight )     Wt Readings from Last 3 Encounters:   22 (!) 140 kg (307 lb 12 8 oz)   22 (!) 139 kg (307 lb 4 8 oz)   22 (!) 139 kg (307 lb 3 2 oz)       Adilson Zhonger Equation:    Estimated calories for weight loss 1731-5896 ( 1-2# per wk wt loss - sedentary )  Estimated protein needs 73-91 g (1 2-1 5 gms/kg IBW )   Estimated fluid needs 2118 mL/70 oz (35 ml/kg IBW )      Weight History   Onset of Obesity: Childhood -has increased since birth of luz  Family history of obesity: Yes-parents  Wt Loss Attempts: Exercise  Nutrition Counseling with RD  OTC meds/supplements  Weight watchers  Patient has tried the above for 6 months or more with insufficient weight loss or weight regain, which is why patient has requested to be evaluated for weight loss surgery today  Maximum Wt Lost: 60 lbs      Review of History and Medications   Past Medical History:   Diagnosis Date   • Anxiety    • Hashimoto's thyroiditis    • Hyperlipidemia    • Obesity      Past Surgical History:   Procedure Laterality Date   •  SECTION     • WISDOM TOOTH EXTRACTION       Social History     Socioeconomic History   • Marital status: /Civil Union     Spouse name: Not on file   • Number of children: Not on file   • Years of education: Not on file   • Highest education level: Not on file   Occupational History   • Not on file   Tobacco Use   • Smoking status: Former     Packs/day: 0 25     Years: 10 00 Pack years: 2 50     Types: Cigarettes     Quit date: 2013     Years since quittin 4   • Smokeless tobacco: Never   Vaping Use   • Vaping Use: Never used   Substance and Sexual Activity   • Alcohol use: Yes     Comment: Social   • Drug use: Never   • Sexual activity: Not on file   Other Topics Concern   • Not on file   Social History Narrative   • Not on file     Social Determinants of Health     Financial Resource Strain: Not on file   Food Insecurity: Not on file   Transportation Needs: Not on file   Physical Activity: Not on file   Stress: Not on file   Social Connections: Not on file   Intimate Partner Violence: Not on file   Housing Stability: Not on file       Current Outpatient Medications:   •  buPROPion (WELLBUTRIN XL) 150 mg 24 hr tablet, Take 150 mg by mouth every morning, Disp: , Rfl:   •  buPROPion (WELLBUTRIN XL) 150 mg 24 hr tablet, Take 150 mg by mouth every 24 hours (Patient not taking: Reported on 2022), Disp: , Rfl:   •  buPROPion (WELLBUTRIN XL) 150 mg 24 hr tablet, every 24 hours (Patient not taking: Reported on 2022), Disp: , Rfl:   •  levonorgestrel (MIRENA) 20 MCG/24HR IUD, by Intrauterine route, Disp: , Rfl:   •  levothyroxine 50 mcg tablet, Take 1 tablet daily and take 2 pills Friday, Saturday and   , Disp: 120 tablet, Rfl: 3  •  metFORMIN (GLUCOPHAGE-XR) 750 mg 24 hr tablet, every 24 hours, Disp: , Rfl:   •  metFORMIN (GLUCOPHAGE-XR) 750 mg 24 hr tablet, TAKE 1 TABLET BY MOUTH IN THE MORNING (Patient not taking: Reported on 2022), Disp: 30 tablet, Rfl: 1  •  sertraline (ZOLOFT) 25 mg tablet, Take 25 mg by mouth daily (Patient not taking: Reported on 2022), Disp: , Rfl: 1  Food Intake and Lifestyle Assessment   Food Intake Assessment completed via usual diet recall  Breakfast: 8 am premier protein shake, 12 oz coffee with two splenda and 1 creamer  Snack: 11 am , fruit  Lunch: 12 premier and one serving of baby carrots with 80 calories [place] Yogurt Snack: 3 pm [employer] bars   Dinner: 5:30 leonel roast with potatoes (5 baby potatoes), corn  Chicken or other lean protein   Snack: occasional snacking - mini PB cup   Beverage intake: water and trina tea flavoring or another water flavor  Protein supplement: premier protein 1/day  Estimated protein intake per day: >68 g/d  Estimated fluid intake per day: >55 oz/d, has 32 oz water bottle refilled twice  Meals eaten away from home: minimal, maybe once in a great while  Typical meal pattern: 3 meals per day and 0-1 snacks per day  Eating Behaviors: Large portion sizes and Craves sweet foods  Food allergies or intolerances: No Known Allergies  Cultural or Episcopal considerations: No    Physical Assessment  Physical Activity  Types of exercise: Walking at lunch when weather permits- 30 minutes three times per week- on hold due to the weather    Is attending [employer] dance  class once per week at daughter's dance studio   Current physical limitations: joint pain    Psychosocial Assessment   Support systems: spouse  Socioeconomic factors: Lives a busy lifestyle between working full time and caring for daughter, leading to variable/infrequent eating habits  Struggles with money  Specifically mentioned in regards to eating out and being able to pay for surgery  Nutrition Diagnosis-continued   Diagnosis: Overweight / Obesity (NC-3 3)  Related to: Physical inactivity and Excessive energy intake  As Evidenced by: BMI >25 and Unintentional weight gain     Nutrition Prescription: Recommend the following diet  1700 calories per day, 85 gm protein per day, 190 gm carbohydrate, 66 gm fat, 70 oz fluid per day, 20 gm fiber per day  Interventions and Teaching   Discussed pre-op and post-op nutrition guidelines  Patient educated and handouts provided    Capacity of post-surgery stomach  Diet progression  Adequate hydration  Sugar and fat restriction to decrease "dumping syndrome"  Fat restriction to decrease steatorrhea  Expected weight loss  Weight loss plateaus/ possibility of weight regain  Exercise  Suggestions for pre-op diet  Nutrition considerations after surgery  Protein supplements  Meal planning and preparation  Appropriate carbohydrate, protein, and fat intake, and food/fluid choices to maximize safe weight loss, nutrient intake, and tolerance   Dietary and lifestyle changes  Possible problems with poor eating habits  Intuitive eating  Techniques for self monitoring and keeping daily food journal  Potential for food intolerance after surgery, and ways to deal with them including: lactose intolerance, nausea, reflux, vomiting, diarrhea, food intolerance, appetite changes, gas  Vitamin / Mineral supplementation of Multivitamin with minerals, Calcium, Vitamin B12, Iron and Vitamin D  Patient is not currently pregnant and doesn't desire to become pregnant a minimum of one year post-op-pt has Mirena IUD    Education provided to: patient    Barriers to learning: No barriers identified    Readiness to change: contemplation    Prior research on procedure: discussed with provider and friends or family    Comprehension: verbalizes understanding     Expected Compliance: good    Workflow:  • Psych and/or D+A Clearance: pending  • PCP Letter: 12/13/2022  • Support Group: 12/14/2022  • Surgeon Appt  done  • EGD ordered 1/18/2023  • Cardiac Risk Assessment 12/22/2022  • Sleep Studies 12/20/2022  • Blood work 10/26/2022  • Nicotine test Ordered   • 6 Month Pre-Operative Program: 4/6  • Weight Loss : goal 303# end of program  • Goal: 287 3 # day of surgery       Recommendations  Pt is an appropriate candidate for surgery   Yes  Pt should make the following changes and then be reassessed for weight loss surgery: 10% wt loss requirement for BMI >55    Evaluation / Monitoring  Dietitian to Monitor: Eating pattern as discussed Tolerance of nutrition prescription Body weight Lab values Physical activity Bowel pattern  Patient continues to drink 2 shakes per day and 2 small snacks ( 150-200 calories ) and one sensible meal ( lean protein/ veggies and starch) for dinner  She has been losing slowly over the past several months following this meal plan  She is still struggling to get her water intake up over 44 ounces per day She is eating off a smaller plate to reduce her portion sizes  Overall she is making lifestyle changes in preparation for surgery and has lost 4% of her body weight          Goals  Eliminate sugar sweetened beverages, Food journal, Complete lession plans 1-6, Eat 3 meals per day and Eliminate mindless snacking   Continue with following meal plan    B  Shake with coffee    S sugar free fruit   L  Shake, plus 80 calories [place] Yogurt and one serving baby carrots    S; sugar free fruit cup or protein bar    D  - lean protein, vegetables and starch   Continue to work on increasing water intake to 64 ounces per day  Practice 30/30 rule and progress to 30/60 rule   Time meals to take 15-20 minutes and chew food well      Time Spent:   30 minutes

## 2022-12-22 ENCOUNTER — CONSULT (OUTPATIENT)
Dept: CARDIOLOGY CLINIC | Facility: CLINIC | Age: 42
End: 2022-12-22

## 2022-12-22 VITALS
WEIGHT: 293 LBS | BODY MASS INDEX: 53.92 KG/M2 | HEIGHT: 62 IN | HEART RATE: 81 BPM | DIASTOLIC BLOOD PRESSURE: 78 MMHG | SYSTOLIC BLOOD PRESSURE: 134 MMHG

## 2022-12-22 DIAGNOSIS — Z01.810 PRE-OPERATIVE CARDIOVASCULAR EXAMINATION: Primary | ICD-10-CM

## 2022-12-22 DIAGNOSIS — E78.5 HYPERLIPIDEMIA, UNSPECIFIED HYPERLIPIDEMIA TYPE: ICD-10-CM

## 2022-12-22 DIAGNOSIS — I10 PRIMARY HYPERTENSION: ICD-10-CM

## 2022-12-22 DIAGNOSIS — E66.01 MORBID OBESITY (HCC): ICD-10-CM

## 2022-12-22 NOTE — PROGRESS NOTES
[address] [facility]  Office Consultation  Fabien East 36 y o  female MRN: [id number]        Chief Complaint    Chief Complaint   Patient presents with   • Pre-op Exam     Gastric bypass       Referring Provider: Edwin Andrade MD    Impression & Plan:    1  Morbid obesity ([address] )  - Ambulatory referral to Cardiology    2  Pre-operative cardiovascular examination  Low cardiac risk for bariatric surgery  Abimbola Quintanilla perioperative risk score is 0 0% for MI or sudden cardiac death, up to 30 days postop  No cardiovascular symptoms with modest exertion  Unremarkable cardiovascular physical exam   Nonischemic EKG  No further cardiac work-up is recommended prior to proceeding with bariatric surgery  - POCT ECG    3  Hyperlipidemia, unspecified hyperlipidemia type  The 10-year ASCVD risk score (Meg BURDICK, et al , 2019) is: 0 9%    Values used to calculate the score:      Age: 36 years      Sex: Female      Is Non- : No      Diabetic: No      Tobacco smoker: No      Systolic Blood Pressure: 918 mmHg      Is BP treated: No      HDL Cholesterol: 48 mg/dL      Total Cholesterol: 195 mg/dL    She has low risk for ASCVD  Her LDL is borderline  No pharmacotherapy indicated at this time  We will continue to monitor with her PCP  Did discuss prospect of cholesterol improvement with weight loss, although I emphasized that bariatric surgery is not a replacement for positive diet/lifestyle/exercise changes  4  Primary hypertension  She does meet criteria for stage I hypertension per the latest ACC/AHA guidelines  We did discuss lifestyle modification including diet, weight loss, exercise  Bariatric surgery will also be a significant part of this  Pharmacotherapy is not indicated at this time  We will continue to monitor with her PCP  We will see Fabien East back PRN       HPI: Fabien East is a 36y o  year old female with obesity presenting for preoperative risk evaluation for bariatric surgery  No cardiac symptoms  Relatively sedentary, but did recently start adult [employer] dance class  No dyspnea, chest pain, chest pressure  Review of Systems   Constitutional: Negative for activity change and fatigue  HENT: Negative for facial swelling  Eyes: Negative for visual disturbance  Respiratory: Negative for chest tightness and shortness of breath  Cardiovascular: Negative for chest pain, palpitations and leg swelling  Gastrointestinal: Negative for nausea and vomiting  Musculoskeletal: Negative for myalgias  Skin: Negative for pallor  Allergic/Immunologic: Negative for immunocompromised state  Neurological: Negative for dizziness, syncope and light-headedness  Psychiatric/Behavioral: Negative for agitation and confusion  The patient is not nervous/anxious  Past Medical History:   Diagnosis Date   • Anxiety    • Hashimoto's thyroiditis    • Hyperlipidemia    • Obesity      Past Surgical History:   Procedure Laterality Date   •  SECTION     • WISDOM TOOTH EXTRACTION       Social History     Substance and Sexual Activity   Alcohol Use Yes    Comment: Social     Social History     Substance and Sexual Activity   Drug Use Never     Social History     Tobacco Use   Smoking Status Former   • Packs/day: 0 25   • Years: 10 00   • Pack years: 2 50   • Types: Cigarettes   • Quit date: 2013   • Years since quittin 4   Smokeless Tobacco Never     Family History   Problem Relation Age of Onset   • Hypothyroidism Mother    • Lung cancer Mother    • Hyperlipidemia Father    • Hypertension Father    • Diabetes type II Paternal Grandmother    • Diabetes type II Maternal Aunt    • Diabetes type II Maternal Uncle    • Diabetes type II Paternal Uncle        Allergies:  No Known Allergies    Medications (as of START of this encounter):    Outpatient Medications Prior to Visit   Medication Sig Dispense Refill   • buPROPion (WELLBUTRIN XL) 150 mg 24 hr tablet Take 150 mg by mouth every morning     • levonorgestrel (MIRENA) 20 MCG/24HR IUD by Intrauterine route     • levothyroxine 50 mcg tablet Take 1 tablet daily and take 2 pills Friday, Saturday and Sunday  120 tablet 3   • metFORMIN (GLUCOPHAGE-XR) 750 mg 24 hr tablet every 24 hours     • buPROPion (WELLBUTRIN XL) 150 mg 24 hr tablet Take 150 mg by mouth every 24 hours (Patient not taking: Reported on 11/23/2022)     • buPROPion (WELLBUTRIN XL) 150 mg 24 hr tablet every 24 hours     • metFORMIN (GLUCOPHAGE-XR) 750 mg 24 hr tablet TAKE 1 TABLET BY MOUTH IN THE MORNING (Patient not taking: Reported on 11/23/2022) 30 tablet 1   • sertraline (ZOLOFT) 25 mg tablet Take 25 mg by mouth daily (Patient not taking: Reported on 7/19/2022)  1     No facility-administered medications prior to visit  Vitals:    12/22/22 1250   BP: 134/78   Pulse: 81     Weight (last 2 days)     Date/Time Weight    12/22/22 1250 139 (306)          General: Nirav Navarro is a morbidly obese but otherwise well appearing female, in no acute distress, sitting comfortably  HEENT: moist mucous membranes, EOMI  Neck:  No JVD, supple, trachea midline   Cardiovascular: unremarkable S1/S2, regular rate and rhythm, no murmurs, rubs or gallops   Pulmonary: normal respiratory effort, CTAB   Abdomen: soft and nondistended  Extremities: No lower extremity edema  Warm and well perfused extremities  Neuro: no focal motor deficits, AAOx3 (person, place, time)  Psych: Normal mood and affect, cooperative      Laboratory Studies:    Laboratory studies personally reviewed  Lipid profile 5/9/22 reviewed --  mg/dL  Hb A1c 10/26/22 reviewed 4 6%  Basic metabolic panel 6/7/2033 reviewed, within normal limits  LFTs 5/9/2022 reviewed, within normal limits        Cardiac testing:     EKG reviewed personally:   EKG in the office today NSR 81 bpm, normal axis, normal intervals  Normal study             Time Spent:  Total time (face-to-face and non-face-to-face) spent on today's visit was 40 minutes  This includes preparation for the visits (i e  reviewing test results), performance of a medically appropriate history and examination, and orders for medications, tests or other procedures  This time is exclusive of procedures performed and time spent teaching  Kristian Pineda MD    This note was completed in part utilizing [employer] direct voice recognition software  Grammatical errors, random word insertion, spelling mistakes, occasional wrong word or "sound-alike" substitutions and incomplete sentences may be an occasional consequence of the system secondary to software limitations, ambient noise and hardware issues  At the time of dictation, efforts were made to edit, clarify and /or correct errors  Please read the chart carefully and recognize, using context, where substitutions have occurred  If you have any questions or concerns about the context, text or information contained within the body of this dictation, please contact myself, the provider, for further clarification

## 2023-01-11 ENCOUNTER — HOSPITAL ENCOUNTER (OUTPATIENT)
Dept: SLEEP CENTER | Facility: HOSPITAL | Age: 43
Discharge: HOME | End: 2023-01-11
Attending: INTERNAL MEDICINE

## 2023-01-11 DIAGNOSIS — R51.9 HEADACHE UPON AWAKENING: ICD-10-CM

## 2023-01-11 DIAGNOSIS — G47.33 OSA (OBSTRUCTIVE SLEEP APNEA): ICD-10-CM

## 2023-01-12 NOTE — PROGRESS NOTES
Home Sleep Study Documentation    HOME STUDY DEVICE: Noxturnal yes                                           Milly G3 no      Pre-Sleep Home Study:    Set-up and instructions performed by: PONCE Siddiqi    Technician performed demonstration for Patient: yes    Return demonstration performed by Patient: yes    Written instructions provided to Patient: yes    Patient signed consent form: yes        Post-Sleep Home Study:    Additional comments by Patient: pending    Home Sleep Study Failed:pending    Failure reason: pending    Reported or Detected: pending    Scored by: pending

## 2023-01-17 DIAGNOSIS — G47.34 SLEEP RELATED HYPOXIA: Primary | ICD-10-CM

## 2023-01-17 DIAGNOSIS — G47.33 OSA (OBSTRUCTIVE SLEEP APNEA): ICD-10-CM

## 2023-01-17 RX ORDER — ONDANSETRON 2 MG/ML
4 INJECTION INTRAMUSCULAR; INTRAVENOUS EVERY 6 HOURS PRN
OUTPATIENT
Start: 2023-01-17

## 2023-01-17 RX ORDER — SODIUM CHLORIDE 9 MG/ML
125 INJECTION, SOLUTION INTRAVENOUS CONTINUOUS
OUTPATIENT
Start: 2023-04-24

## 2023-01-18 ENCOUNTER — PREP FOR PROCEDURE (OUTPATIENT)
Dept: BARIATRICS | Facility: CLINIC | Age: 43
End: 2023-01-18

## 2023-01-18 DIAGNOSIS — Z98.84 BARIATRIC SURGERY STATUS: Primary | ICD-10-CM

## 2023-01-19 ENCOUNTER — TELEPHONE (OUTPATIENT)
Dept: SLEEP CENTER | Facility: CLINIC | Age: 43
End: 2023-01-19

## 2023-01-19 NOTE — TELEPHONE ENCOUNTER
Moderate to severe TAYLOR with hypoxia Dr Ortiz Fraction ordered CPAP study     Patient needs to schedule CPAP study   Left call back message

## 2023-01-23 NOTE — TELEPHONE ENCOUNTER
Advised patient sleep study results  Explained CPAP study and scheduled first available CPAP study   Patient placed on wait list

## 2023-01-27 ENCOUNTER — OFFICE VISIT (OUTPATIENT)
Dept: BARIATRICS | Facility: CLINIC | Age: 43
End: 2023-01-27

## 2023-01-27 VITALS — BODY MASS INDEX: 55.77 KG/M2 | WEIGHT: 293 LBS

## 2023-01-27 DIAGNOSIS — E66.01 MORBID (SEVERE) OBESITY DUE TO EXCESS CALORIES (HCC): Primary | ICD-10-CM

## 2023-01-27 NOTE — PROGRESS NOTES
Name             Susu Padilla    5/6 weight check  Starting weight 315 5  Last time weight 305 6  Today's weight 300 0  BMI:55 77    Surgery month:  March/April  Surgery deadline: July  Surgeon: Dr Steve Aparicio Follow Up Note:    pt has  6 months program requirement     Mental health and wellness -   Patient presents to the office today for weight check and support visit  The pt reports work stress is manageable  Some concerns   were about being "put under" for her EDG and bariatric surgery  Hx of her brother having gull bladder surgery and reported he flatlined for ten seconds  Processed concerns and discussed reviewing worries with surgeon  The pt also reports some stress with her almost completing weight checks and when scheduling sleep study for CPAP  the earliest appointment was on  Aug 27, 2023 but placed self on wait list       Eating behaviors - The pt explained she started to add healthy snacks between meals  Three meals a day-a protein drink in the morning, lunch-a protein drink and a meal at dinner  Practicing 30/60 rule, working on chewing food  Pre-plans meals   Hydration- 44 oz of water a day  12 oz of coffee a day  Activity -  Increased activity exercising to  videos and taking a dance class on thursdays for one hour  Progress toward program requirements    Workflow:  • Psych and/or D+A Clearance: done  • PCP Letter: 12/13/2022  • Support Group: 12/14/2022  • Surgeon Appt  done  • EGD ordered 1/18/2023  • Cardiac Risk Assessment 12/22/2022  • Sleep Studies 12/20/2022 on 1/19/23 note states pt needs to schedule for CPAP study    • Blood work 10/26/2022  • Nicotine test- Done on 1 27 23  • 6 Month Pre-Operative Program: 4/6  • Weight Loss : goal 303# end of program     Reviewed and discussed  Adequate hydration  Exercise  Meal planning and preparation  Lifestyle changes  Possible problems with poor eating habits  Continue Techniques for self monitoring    Goal: chewing food slowly  Next appointment: 2/17/23 RD

## 2023-01-31 LAB
COTININE SERPL-MCNC: <2 NG/ML
NICOTINE SERPL-MCNC: <2 NG/ML

## 2023-02-03 ENCOUNTER — TELEPHONE (OUTPATIENT)
Dept: SLEEP CENTER | Facility: CLINIC | Age: 43
End: 2023-02-03

## 2023-02-03 NOTE — TELEPHONE ENCOUNTER
Isabel Elmore was denied per meritain ref [id number]  There is no documentation of Apnea Hypopnea Index (AHI) is greater than 15 in first 2 hours of a diagnostic sleep study  If you would like to do a peer to peer, its needs to be done with in the next 10 business days, please call [phone]

## 2023-02-06 ENCOUNTER — TELEPHONE (OUTPATIENT)
Dept: BARIATRICS | Facility: CLINIC | Age: 43
End: 2023-02-06

## 2023-02-13 RX ORDER — SODIUM CHLORIDE 9 MG/ML
125 INJECTION, SOLUTION INTRAVENOUS CONTINUOUS
Status: CANCELLED | OUTPATIENT
Start: 2023-02-13

## 2023-02-15 ENCOUNTER — ANESTHESIA (OUTPATIENT)
Dept: GASTROENTEROLOGY | Facility: HOSPITAL | Age: 43
End: 2023-02-15

## 2023-02-15 ENCOUNTER — HOSPITAL ENCOUNTER (OUTPATIENT)
Dept: GASTROENTEROLOGY | Facility: HOSPITAL | Age: 43
Setting detail: OUTPATIENT SURGERY
Discharge: HOME | End: 2023-02-15
Attending: SURGERY | Admitting: SURGERY

## 2023-02-15 ENCOUNTER — ANESTHESIA EVENT (OUTPATIENT)
Dept: GASTROENTEROLOGY | Facility: HOSPITAL | Age: 43
End: 2023-02-15

## 2023-02-15 VITALS
OXYGEN SATURATION: 97 % | DIASTOLIC BLOOD PRESSURE: 71 MMHG | HEART RATE: 82 BPM | RESPIRATION RATE: 16 BRPM | HEIGHT: 62 IN | BODY MASS INDEX: 53.92 KG/M2 | SYSTOLIC BLOOD PRESSURE: 131 MMHG | WEIGHT: 293 LBS | TEMPERATURE: 98.8 F

## 2023-02-15 DIAGNOSIS — Z98.84 BARIATRIC SURGERY STATUS: ICD-10-CM

## 2023-02-15 LAB
EXT PREGNANCY TEST URINE: NEGATIVE
EXT. CONTROL: NORMAL
GLUCOSE SERPL-MCNC: 98 MG/DL (ref 65–140)

## 2023-02-15 RX ORDER — LIDOCAINE HYDROCHLORIDE 20 MG/ML
INJECTION, SOLUTION EPIDURAL; INFILTRATION; INTRACAUDAL; PERINEURAL AS NEEDED
Status: DISCONTINUED | OUTPATIENT
Start: 2023-02-15 | End: 2023-02-15

## 2023-02-15 RX ORDER — GLYCOPYRROLATE 0.2 MG/ML
INJECTION INTRAMUSCULAR; INTRAVENOUS AS NEEDED
Status: DISCONTINUED | OUTPATIENT
Start: 2023-02-15 | End: 2023-02-15

## 2023-02-15 RX ORDER — SODIUM CHLORIDE 9 MG/ML
125 INJECTION, SOLUTION INTRAVENOUS CONTINUOUS
Status: DISCONTINUED | OUTPATIENT
Start: 2023-02-15 | End: 2023-02-19 | Stop reason: HOSPADM

## 2023-02-15 RX ORDER — PROPOFOL 10 MG/ML
INJECTION, EMULSION INTRAVENOUS AS NEEDED
Status: DISCONTINUED | OUTPATIENT
Start: 2023-02-15 | End: 2023-02-15

## 2023-02-15 RX ORDER — OMEPRAZOLE 20 MG/1
20 CAPSULE, DELAYED RELEASE ORAL DAILY
Qty: 30 CAPSULE | Refills: 3 | Status: SHIPPED | OUTPATIENT
Start: 2023-02-15

## 2023-02-15 RX ADMIN — LIDOCAINE HYDROCHLORIDE 100 MG: 20 INJECTION, SOLUTION EPIDURAL; INFILTRATION; INTRACAUDAL; PERINEURAL at 10:32

## 2023-02-15 RX ADMIN — SODIUM CHLORIDE: 0.9 INJECTION, SOLUTION INTRAVENOUS at 10:17

## 2023-02-15 RX ADMIN — PROPOFOL 50 MG: 10 INJECTION, EMULSION INTRAVENOUS at 10:34

## 2023-02-15 RX ADMIN — PROPOFOL 200 MG: 10 INJECTION, EMULSION INTRAVENOUS at 10:32

## 2023-02-15 RX ADMIN — GLYCOPYRROLATE 0.2 MG: 0.2 INJECTION INTRAMUSCULAR; INTRAVENOUS at 10:29

## 2023-02-15 NOTE — PROGRESS NOTES
Bariatric Nutrition Assessment Note    Type of surgery    Preop  Surgery Date: TBD- pt has  6 months program requirement   Today is  of program   Surgeon: Dr Tomy Christian  36 y o   female     Wt with BMI of 25: 133 4 lb  Pre-Op Excess Wt: 182 25  LMP 2023 (Approximate)       Pt lost 2# from last month  She has lost a total of 13 7# since starting the program ( 4% of her body weight )     Wt Readings from Last 3 Encounters:   02/15/23 134 kg (296 lb)   23 136 kg (300 lb)   22 (!) 139 kg (306 lb)       [place] Equation:    Estimated calories for weight loss [id number] ( 1-2# per wk wt loss - sedentary )  Estimated protein needs 73-91 g (1 2-1 5 gms/kg IBW )   Estimated fluid needs 2118 mL/70 oz (35 ml/kg IBW )      Weight History   Onset of Obesity: Childhood -has increased since birth of luz  Family history of obesity: Yes-parents  Wt Loss Attempts: Exercise  Nutrition Counseling with RD  OTC meds/supplements  Weight watchers  Patient has tried the above for 6 months or more with insufficient weight loss or weight regain, which is why patient has requested to be evaluated for weight loss surgery today  Maximum Wt Lost: 60 lbs      Review of History and Medications   Past Medical History:   Diagnosis Date   • Anxiety    • Hashimoto's thyroiditis    • Hyperlipidemia    • Obesity      Past Surgical History:   Procedure Laterality Date   •  SECTION     • WISDOM TOOTH EXTRACTION       Social History     Socioeconomic History   • Marital status: /Civil Union     Spouse name: Not on file   • Number of children: Not on file   • Years of education: Not on file   • Highest education level: Not on file   Occupational History   • Not on file   Tobacco Use   • Smoking status: Former     Packs/day: 0 25     Years: 10 00     Pack years: 2 50     Types: Cigarettes     Quit date: 2013     Years since quittin 6   • Smokeless tobacco: Never   Vaping Use   • Vaping Use: Never used   Substance and Sexual Activity   • Alcohol use: Yes     Comment: Social   • Drug use: Never   • Sexual activity: Not on file   Other Topics Concern   • Not on file   Social History Narrative   • Not on file     Social Determinants of Health     Financial Resource Strain: Not on file   Food Insecurity: Not on file   Transportation Needs: Not on file   Physical Activity: Not on file   Stress: Not on file   Social Connections: Not on file   Intimate Partner Violence: Not on file   Housing Stability: Not on file       Current Outpatient Medications:   •  buPROPion (WELLBUTRIN XL) 150 mg 24 hr tablet, Take 150 mg by mouth every morning, Disp: , Rfl:   •  levonorgestrel (MIRENA) 20 MCG/24HR IUD, by Intrauterine route, Disp: , Rfl:   •  levothyroxine 50 mcg tablet, Take 1 tablet daily and take 2 pills Friday, Saturday and Sunday  , Disp: 120 tablet, Rfl: 3  •  metFORMIN (GLUCOPHAGE-XR) 750 mg 24 hr tablet, every 24 hours, Disp: , Rfl:   •  omeprazole (PriLOSEC) 20 mg delayed release capsule, Take 1 capsule (20 mg total) by mouth daily, Disp: 30 capsule, Rfl: 3  No current facility-administered medications for this visit      Facility-Administered Medications Ordered in Other Visits:   •  sodium chloride 0 9 % infusion, 125 mL/hr, Intravenous, Continuous, Mauricio Yanes DO, Last Rate: 100 mL/hr at 02/15/23 1017, Restarted at 02/15/23 1037  Food Intake and Lifestyle Assessment   Food Intake Assessment completed via usual diet recall  Breakfast: 8 am premier protein shake, 12 oz coffee with two splenda and 1 creamer  Snack: 11 am , fruit  Lunch: 12 premier and one serving of baby carrots with 80 calories [place] Yogurt   Snack: 3 pm Quest bars   Dinner: 5:30 leonel roast with potatoes (5 baby potatoes), corn  Chicken or other lean protein   Snack: occasional snacking - mini PB cup   Beverage intake: water and trina tea flavoring or another water flavor  Protein supplement: premier protein 1/day  Estimated protein intake per day: >68 g/d  Estimated fluid intake per day: >55 oz/d, has 32 oz water bottle refilled twice  Meals eaten away from home: minimal, maybe once in a great while  Typical meal pattern: 3 meals per day and 0-1 snacks per day  Eating Behaviors: Large portion sizes and Craves sweet foods  Food allergies or intolerances: No Known Allergies  Cultural or Jain considerations: No    Physical Assessment  Physical Activity  Types of exercise: Walking at lunch when weather permits- 30 minutes three times per week- on hold due to the weather    Is attending [employer] dance  class once per week at daughter's dance studio   Current physical limitations: joint pain    Psychosocial Assessment   Support systems: spouse  Socioeconomic factors: Lives a busy lifestyle between working full time and caring for daughter, leading to variable/infrequent eating habits  Struggles with money  Specifically mentioned in regards to eating out and being able to pay for surgery  Nutrition Diagnosis-continued   Diagnosis: Overweight / Obesity (NC-3 3)  Related to: Physical inactivity and Excessive energy intake  As Evidenced by: BMI >25 and Unintentional weight gain     Nutrition Prescription: Recommend the following diet  1700 calories per day, 85 gm protein per day, 190 gm carbohydrate, 66 gm fat, 70 oz fluid per day, 20 gm fiber per day  Interventions and Teaching   Discussed pre-op and post-op nutrition guidelines  Patient educated and handouts provided    Capacity of post-surgery stomach  Diet progression  Adequate hydration  Sugar and fat restriction to decrease "dumping syndrome"  Fat restriction to decrease steatorrhea  Expected weight loss  Weight loss plateaus/ possibility of weight regain  Exercise  Suggestions for pre-op diet  Nutrition considerations after surgery  Protein supplements  Meal planning and preparation  Appropriate carbohydrate, protein, and fat intake, and food/fluid choices to maximize safe weight loss, nutrient intake, and tolerance   Dietary and lifestyle changes  Possible problems with poor eating habits  Intuitive eating  Techniques for self monitoring and keeping daily food journal  Potential for food intolerance after surgery, and ways to deal with them including: lactose intolerance, nausea, reflux, vomiting, diarrhea, food intolerance, appetite changes, gas  Vitamin / Mineral supplementation of Multivitamin with minerals, Calcium, Vitamin B12, Iron and Vitamin D  Patient is not currently pregnant and doesn't desire to become pregnant a minimum of one year post-op-pt has Mirena IUD    Education provided to: patient    Barriers to learning: No barriers identified    Readiness to change: contemplation    Prior research on procedure: discussed with provider and friends or family    Comprehension: verbalizes understanding     Expected Compliance: good    Workflow:  • Psych and/or D+A Clearance: pending  • PCP Letter: 12/13/2022  • Support Group: 12/14/2022  • Surgeon Appt  done  • EGD ordered 1/18/2023  • Cardiac Risk Assessment 12/22/2022  • Sleep Studies 12/20/2022  • Blood work 10/26/2022  • Nicotine test Ordered   • 6 Month Pre-Operative Program: 6/6 wt ck   • Weight Loss : goal 303# end of program  • Goal: 287 3 # day of surgery       Recommendations  Pt is an appropriate candidate for surgery  Yes  Pt should make the following changes and then be reassessed for weight loss surgery: 10% wt loss requirement for BMI >55    Evaluation / Monitoring  Dietitian to Monitor: Eating pattern as discussed Tolerance of nutrition prescription Body weight Lab values Physical activity Bowel pattern  Patient continues to drink 2 shakes per day and 2 small snacks ( 150-200 calories ) and one sensible meal ( lean protein/ veggies and starch) for dinner  She has been losing slowly over the past several months following this meal plan    She is still struggling to get her water intake up over 44 ounces per day She is eating off a smaller plate to reduce her portion sizes  Overall she is making lifestyle changes in preparation for surgery and has lost 4% of her body weight          Goals  Eliminate sugar sweetened beverages, Food journal, Complete lession plans 1-6, Eat 3 meals per day and Eliminate mindless snacking   Continue with following meal plan    B  Shake with coffee    S sugar free fruit   L  Shake, plus 80 calories [place] Yogurt and one serving baby carrots    S; sugar free fruit cup or protein bar    D  - lean protein, vegetables and starch   Continue to work on increasing water intake to 64 ounces per day  Practice 30/30 rule and progress to 30/60 rule   Time meals to take 15-20 minutes and chew food well      Time Spent:   30 minutes

## 2023-02-15 NOTE — ANESTHESIA PREPROCEDURE EVALUATION
Procedure:  EGD    Relevant Problems   CARDIO   (+) Hyperlipidemia      ENDO   (+) Hypothyroidism      NEURO/PSYCH   (+) Anxiety      PULMONARY   (+) TAYLOR (obstructive sleep apnea)      Other   (+) Class 3 severe obesity due to excess calories without serious comorbidity with body mass index (BMI) of 50 0 to 59 9 in adult (HCC)   (+) Prediabetes        Physical Exam    Airway    Mallampati score: III  TM Distance: <3 FB  Neck ROM: full     Dental   No notable dental hx     Cardiovascular  Rhythm: regular, Rate: normal,     Pulmonary  Breath sounds clear to auscultation,     Other Findings        Anesthesia Plan  ASA Score- 3     Anesthesia Type- IV sedation with anesthesia with ASA Monitors  Additional Monitors:   Airway Plan:           Plan Factors-Exercise tolerance (METS): >4 METS  Chart reviewed  Patient is not a current smoker  Obstructive sleep apnea risk education given perioperatively  Induction- intravenous  Postoperative Plan-     Informed Consent- Anesthetic plan and risks discussed with patient

## 2023-02-15 NOTE — ANESTHESIA POSTPROCEDURE EVALUATION
Post-Op Assessment Note    CV Status:  Stable    Pain management: adequate     Mental Status:  Awake   Hydration Status:  Stable   PONV Controlled:  Controlled   Airway Patency:  Patent      Post Op Vitals Reviewed: Yes      Staff: Anesthesiologist         No notable events documented      /71 (02/15/23 1040)    Temp      Pulse 95 (02/15/23 1040)   Resp 20 (02/15/23 1040)    SpO2 97 % (02/15/23 1040)

## 2023-02-15 NOTE — H&P
H&P EXAM - Outpatient Endoscopy  [facility] [address] [facility] INTRA   Christopher Rivera 36 y o  female MRN: [id number]  Unit/Bed#:  Encounter: [id number]        Impression: Morbid obesity    Plan:Upper endoscopy and a biopsy to rule out H  Pylori    Chief Complaint: Morbid obesity and preoperative endoscopy    Physical Exam: Normal not in acute distress   Chest: Clear to auscultation   Heart: Normal S1 and S2

## 2023-02-17 ENCOUNTER — OFFICE VISIT (OUTPATIENT)
Dept: BARIATRICS | Facility: CLINIC | Age: 43
End: 2023-02-17

## 2023-02-17 VITALS — WEIGHT: 293 LBS | BODY MASS INDEX: 55.39 KG/M2

## 2023-02-17 DIAGNOSIS — E66.01 MORBID (SEVERE) OBESITY DUE TO EXCESS CALORIES (HCC): Primary | ICD-10-CM

## 2023-02-17 NOTE — PROGRESS NOTES
Name           Brigid Lombarod      6/6 weight check  Starting weight 315 5  Last time weight 300  Today's weight 298 0  BMI:55 39    Surgery month:  March/April   Surgery deadline: July  Surgeon:Dr Cornelius Yu  Type of Surgery: [address] Follow Up Note:        Mental health and wellness -   Patient presents to the office today for weight check and support visit  The pt reported her mood is stable  The pt explained she rely's on herself as her support  Spouse will be taking off work to be supportive after her surgery  Reviewed concerns with CPAP and discussed with   Eating behaviors - The pt shared she continues to have protein drink in the morning and a cup of coffee  Fruit cup in between lunch and breakfast and later a fruit cup  Then a normal dinner  Uses a small plate to have portion control  Working on chewing slow and practicing 30/60 rule  Hydration- 44 oz of water  Activity -  [employer] class one time a week for a half hour  Will start walking 1/2 hour three days a week  Progress toward program requirements    Workflow:  • Psych and/or D+A Clearance: done  • PCP Letter: 12/13/2022  • Support Group: 12/14/2022  • Surgeon Appt  done  • EGD ordered 1/18/2023  • Cardiac Risk Assessment 12/22/2022  • Sleep Studies 12/20/2022 on 1/19/23 note states pt needs to schedule for CPAP study    • Blood work 10/26/2022  • Nicotine test- Done on 1 27 23  • Weight Loss : goal 303# end of program  Reviewed and discussed    Adequate hydration  Exercise  Meal planning and preparation  Lifestyle changes  Possible problems with poor eating habits  Techniques for self monitoring and keeping daily food journal    Goal: 1-Maintain healthy weight loss 2-start a healthy routine with exercise  Next appointment: 3/17/23 RD

## 2023-02-22 ENCOUNTER — HOSPITAL ENCOUNTER (OUTPATIENT)
Dept: SLEEP CENTER | Facility: CLINIC | Age: 43
Discharge: HOME | End: 2023-02-22

## 2023-02-22 DIAGNOSIS — G47.33 OSA (OBSTRUCTIVE SLEEP APNEA): ICD-10-CM

## 2023-02-22 DIAGNOSIS — G47.34 SLEEP RELATED HYPOXIA: ICD-10-CM

## 2023-02-23 DIAGNOSIS — G47.33 OSA (OBSTRUCTIVE SLEEP APNEA): Primary | ICD-10-CM

## 2023-02-23 NOTE — PROGRESS NOTES
Sleep Study Documentation    Pre-Sleep Study       Sleep testing procedure explained to patient:YES    Patient napped prior to study:NO    Caffeine:Dayshift worker after 12PM   Caffeine use:YES- coffee  6 ounces    Alcohol:Dayshift workers after 5PM: Alcohol use:NO    Typical day for patient:YES       Study Documentation    Sleep Study Indications: TAYLOR, sleep related hypoxia, anxiety, class 3 obesity,    Sleep Study: Treatment   Optimal PAP pressure: 14cm  Leak:Small  Snore:Eliminated  REM Obtained:no  Supplemental O2: no    Minimum SaO2 88%  Baseline SaO2 95%  PAP mask tried (list all) small resmed airfit F20  PAP mask choice (final) small resmed airfit F20  PAP mask type:full face  PAP pressure at which snoring was eliminated 8cm  Minimum SaO2 at final PAP pressure 91%  Mode of Therapy:CPAP  ETCO2:No  CPAP changed to BiPAP:No    Mode of Therapy:CPAP    EKG abnormalities: no     EEG abnormalities: no    Sleep Study Recorded < 2 hours: N/A    Sleep Study Recorded > 2 hours but incomplete study: N/A    Sleep Study Recorded 6 hours but no sleep obtained: NO    Patient classification: employed       Post-Sleep Study    Medication used at bedtime or during sleep study:YES other prescription medications    Patient reports time it took to fall asleep:greater than 60 minutes    Patient reports waking up during study:3 or more times  Patient reports returning to sleep in greater than 30 minutes  Patient reports sleeping 2 to 4 hours without dreaming  Patient reports sleep during study:worse than usual    Patient rated sleepiness: Somewhat sleepy or tired    PAP treatment:yes: Post PAP treatment patient reports feeling unsure if a change is noted and  would wear PAP mask at home

## 2023-03-02 ENCOUNTER — TELEPHONE (OUTPATIENT)
Dept: SLEEP CENTER | Facility: CLINIC | Age: 43
End: 2023-03-02

## 2023-03-02 NOTE — TELEPHONE ENCOUNTER
CPAP study completed and Dr Renard Lennox ordered APAP     Patient needs DME set up and compliance appointments scheduled     Left call back message

## 2023-03-07 DIAGNOSIS — E66.01 MORBID (SEVERE) OBESITY DUE TO EXCESS CALORIES (HCC): ICD-10-CM

## 2023-03-07 DIAGNOSIS — R73.03 PREDIABETES: ICD-10-CM

## 2023-03-07 RX ORDER — METFORMIN HYDROCHLORIDE 750 MG/1
TABLET, EXTENDED RELEASE ORAL
Qty: 30 TABLET | Refills: 1 | Status: SHIPPED | OUTPATIENT
Start: 2023-03-07

## 2023-03-09 NOTE — TELEPHONE ENCOUNTER
Returned the patient's call  Advised CPAP study completed and Dr Madeline Reyes ordered APAP   Patient scheduled for DME set up and compliance and placed on wait list  Patient needs compliance between 4/29/2023 and 6/26/2023    RX and clinicals sent to [address] [place] [address]

## 2023-03-10 LAB
DME PARACHUTE DELIVERY DATE REQUESTED: NORMAL
DME PARACHUTE ITEM DESCRIPTION: NORMAL
DME PARACHUTE ORDER STATUS: NORMAL
DME PARACHUTE SUPPLIER NAME: NORMAL
DME PARACHUTE SUPPLIER PHONE: NORMAL

## 2023-03-14 LAB — HBA1C MFR BLD HPLC: 5.6 %

## 2023-03-17 ENCOUNTER — OFFICE VISIT (OUTPATIENT)
Dept: BARIATRICS | Facility: CLINIC | Age: 43
End: 2023-03-17

## 2023-03-17 VITALS — WEIGHT: 293 LBS | BODY MASS INDEX: 53.92 KG/M2 | HEIGHT: 62 IN

## 2023-03-17 DIAGNOSIS — Z91.89 RISK FACTORS FOR OBSTRUCTIVE SLEEP APNEA: ICD-10-CM

## 2023-03-17 DIAGNOSIS — E66.01 OBESITY, CLASS III, BMI 40-49.9 (MORBID OBESITY) (HCC): ICD-10-CM

## 2023-03-17 DIAGNOSIS — F17.211 CIGARETTE NICOTINE DEPENDENCE IN REMISSION: ICD-10-CM

## 2023-03-17 DIAGNOSIS — Z01.818 PRE-OPERATIVE CLEARANCE: Primary | ICD-10-CM

## 2023-03-17 DIAGNOSIS — E03.8 HYPOTHYROIDISM DUE TO HASHIMOTO'S THYROIDITIS: ICD-10-CM

## 2023-03-17 DIAGNOSIS — R63.5 ABNORMAL WEIGHT GAIN: ICD-10-CM

## 2023-03-17 DIAGNOSIS — E78.5 HYPERLIPIDEMIA, UNSPECIFIED HYPERLIPIDEMIA TYPE: ICD-10-CM

## 2023-03-17 DIAGNOSIS — F41.9 ANXIETY: ICD-10-CM

## 2023-03-17 DIAGNOSIS — E06.3 HYPOTHYROIDISM DUE TO HASHIMOTO'S THYROIDITIS: ICD-10-CM

## 2023-03-17 DIAGNOSIS — R73.03 PREDIABETES: ICD-10-CM

## 2023-03-17 NOTE — PROGRESS NOTES
Bariatric Nutrition Assessment Note    Type of surgery    Preop  Surgery Date: TBD- pt has  6 months program requirement   Today is  of program   Surgeon: Dr Pito Valentine  36 y o   female     Wt with BMI of 25: 133 4 lb  Pre-Op Excess Wt: 182 25  Ht 5' 1 5" (1 562 m)   Wt 134 kg (294 lb 12 8 oz)   BMI 54 80 kg/m²       She has lost a total of 17 7# ( 7% of her body weight ) since starting the program; 4# since last visit with RD      Wt Readings from Last 3 Encounters:   23 135 kg (298 lb)   02/15/23 134 kg (296 lb)   23 136 kg (300 lb)       [facility] Equation:    Estimated calories for weight loss 0903-8418 ( 1-2# per wk wt loss - sedentary )  Estimated protein needs 73-91 g (1 2-1 5 gms/kg IBW )   Estimated fluid needs 2118 mL/70 oz (35 ml/kg IBW )      Weight History   Onset of Obesity: Childhood -has increased since birth of daughter  Family history of obesity: Yes-parents  Wt Loss Attempts: Exercise  Nutrition Counseling with RD  OTC meds/supplements  Weight watchers  Patient has tried the above for 6 months or more with insufficient weight loss or weight regain, which is why patient has requested to be evaluated for weight loss surgery today  Maximum Wt Lost: 60 lbs      Review of History and Medications   Past Medical History:   Diagnosis Date   • Anxiety    • Hashimoto's thyroiditis    • Hyperlipidemia    • Obesity      Past Surgical History:   Procedure Laterality Date   •  SECTION     • WISDOM TOOTH EXTRACTION       Social History     Socioeconomic History   • Marital status: /Civil Union     Spouse name: Not on file   • Number of children: Not on file   • Years of education: Not on file   • Highest education level: Not on file   Occupational History   • Not on file   Tobacco Use   • Smoking status: Former     Packs/day: 0 25     Years: 10 00     Pack years: 2 50     Types: Cigarettes     Quit date: 2013     Years since quittin 7   • Smokeless tobacco: Never   Vaping Use   • Vaping Use: Never used   Substance and Sexual Activity   • Alcohol use: Yes     Comment: Social   • Drug use: Never   • Sexual activity: Not on file   Other Topics Concern   • Not on file   Social History Narrative   • Not on file     Social Determinants of Health     Financial Resource Strain: Not on file   Food Insecurity: Not on file   Transportation Needs: Not on file   Physical Activity: Not on file   Stress: Not on file   Social Connections: Not on file   Intimate Partner Violence: Not on file   Housing Stability: Not on file       Current Outpatient Medications:   •  buPROPion (WELLBUTRIN XL) 150 mg 24 hr tablet, Take 150 mg by mouth every morning, Disp: , Rfl:   •  levonorgestrel (MIRENA) 20 MCG/24HR IUD, by Intrauterine route, Disp: , Rfl:   •  levothyroxine 50 mcg tablet, Take 1 tablet daily and take 2 pills Friday, Saturday and   , Disp: 120 tablet, Rfl: 3  •  metFORMIN (GLUCOPHAGE-XR) 750 mg 24 hr tablet, TAKE 1 TABLET BY MOUTH IN THE MORNING, Disp: 30 tablet, Rfl: 1  •  omeprazole (PriLOSEC) 20 mg delayed release capsule, Take 1 capsule (20 mg total) by mouth daily, Disp: 30 capsule, Rfl: 3  Food Intake and Lifestyle Assessment      Food Intake Assessment completed via usual diet recall    Breakfast: 8 am premier protein shake, 12 oz coffee with two splenda and 1 creamer, sometimes will have 2 eggs over weekend  Snack: 11 am , fruit - sugar-free dole cup - peaches, mixed fruit, pear  Lunch: 12 premier and another fruit cup   Snack: 3 pm 80 calories [place] yogurt  Dinner: 5:30 leonel roast with potatoes (5 baby potatoes), corn  Chicken or other lean protein   Salad with ground beef and Justin dressing  White rice with ground beef  Snack: 0  Beverages: Water with low martinez flavoring - getting close to 44 oz  Protein supplement: premier protein 2/day, no longer purchasing Quest bars d/t cost  Estimated protein intake per day: >55 g/d  Estimated fluid intake per day: ~44 oz/d has 32 oz water bottle refilled twice, doesn't drink second one all the way  Meals eaten away from home: minimal, maybe once in a great while, packs lunch everyday  Typical meal pattern: 3 meals per day and 0-1 snacks per day, sticks to 8-9" plate except on occasion   Eating Behaviors: Large portion sizes and Craves sweet foods  Food allergies or intolerances: No Known Allergies  Cultural or Taoism considerations: No    Physical Assessment  Physical Activity  Types of exercise: Walking 1 5 miles at lunch three-four times per week; attending [employer] dance  class once per week at daughter's dance studio   Current physical limitations: joint pain    Psychosocial Assessment   Support systems: spouse  Socioeconomic factors: Lives a busy lifestyle between working full time and caring for daughter, leading to variable/infrequent eating habits  Struggles with money  Specifically mentioned in regards to eating out and being able to pay for surgery  Nutrition Diagnosis-continued   Diagnosis: Overweight / Obesity (NC-3 3)  Related to: Physical inactivity and Excessive energy intake  As Evidenced by: BMI >25 and Unintentional weight gain     Nutrition Prescription: Recommend the following diet  1700 calories per day, 85 gm protein per day, 190 gm carbohydrate, 66 gm fat, 70 oz fluid per day, 20 gm fiber per day  Interventions and Teaching   Discussed pre-op and post-op nutrition guidelines  Patient educated and handouts provided    Capacity of post-surgery stomach  Diet progression  Adequate hydration  Sugar and fat restriction to decrease "dumping syndrome"  Fat restriction to decrease steatorrhea  Expected weight loss  Weight loss plateaus/ possibility of weight regain  Exercise  Suggestions for pre-op diet  Nutrition considerations after surgery  Protein supplements  Meal planning and preparation  Appropriate carbohydrate, protein, and fat intake, and food/fluid choices to maximize safe weight loss, nutrient intake, and tolerance   Dietary and lifestyle changes  Possible problems with poor eating habits  Intuitive eating  Techniques for self monitoring and keeping daily food journal  Potential for food intolerance after surgery, and ways to deal with them including: lactose intolerance, nausea, reflux, vomiting, diarrhea, food intolerance, appetite changes, gas  Vitamin / Mineral supplementation of Multivitamin with minerals, Calcium, Vitamin B12, Iron and Vitamin D  Patient is not currently pregnant and doesn't desire to become pregnant a minimum of one year post-op-pt has Mirena IUD    Education provided to: patient    Barriers to learning: No barriers identified    Readiness to change: contemplation    Prior research on procedure: discussed with provider and friends or family    Comprehension: verbalizes understanding     Expected Compliance: good    Workflow:  • Psych and/or D+A Clearance: pending  • PCP Letter: 12/13/2022  • Support Group: 12/14/2022  • Surgeon Appt  done  • EGD ordered 1/18/2023  • Cardiac Risk Assessment 12/22/2022  • Sleep Studies 12/20/2022  • Blood work 10/26/2022  • Nicotine test Ordered   • 6 Month Pre-Operative Program: 4/6  • Weight Loss : goal 303# end of program  • Goal: 287 3 # day of surgery       Recommendations  Pt is an appropriate candidate for surgery  Yes  Pt should make the following changes and then be reassessed for weight loss surgery: 10% wt loss requirement for BMI >55    Evaluation / Monitoring  Dietitian to Monitor: Eating pattern as discussed Tolerance of nutrition prescription Body weight Lab values Physical activity Bowel pattern  Patient continues to drink 2 shakes per day and 2 small snacks ( 150-200 calories ) and one larger meal for dinner  Admits she can do better at incorporating a lean protein (in place of red meat), more vegetables and whole grains   She has been losing slowly over the past several months following this meal plan  She is still struggling to get her water intake up over 44 ounces per day  She is eating off a smaller plate to reduce her portion sizes and has increased physical activity  Overall she is making lifestyle changes in preparation for surgery and has lost 7% of her body weight          Goals   Continue with following meal plan    B  Shake with coffee    S sugar free fruit   L  Shake, plus 80 calories [place] Yogurt and one serving baby carrots    S; sugar free fruit cup or protein bar    D  - lean protein, vegetables and starch   Continue to work on increasing water intake to 64 ounces per day  Practice 30/60 rule   Time meals to take 15-20 minutes and chew food well    Substitute ground beef with baked chicken at dinner  Make half of grains whole grain using the following ideas:   Using half refined half whole wheat pasta   Using whole wheat tortillas   Trial white whole wheat bread  Maintain exercise regimen    Time Spent:   30 minutes

## 2023-03-21 DIAGNOSIS — E03.8 HYPOTHYROIDISM DUE TO HASHIMOTO'S THYROIDITIS: ICD-10-CM

## 2023-03-21 DIAGNOSIS — G47.33 OSA (OBSTRUCTIVE SLEEP APNEA): ICD-10-CM

## 2023-03-21 DIAGNOSIS — Z01.818 PRE-OPERATIVE CLEARANCE: Primary | ICD-10-CM

## 2023-03-21 DIAGNOSIS — E78.5 HYPERLIPIDEMIA, UNSPECIFIED HYPERLIPIDEMIA TYPE: ICD-10-CM

## 2023-03-21 DIAGNOSIS — E06.3 HYPOTHYROIDISM DUE TO HASHIMOTO'S THYROIDITIS: ICD-10-CM

## 2023-03-21 DIAGNOSIS — E66.01 OBESITY, CLASS III, BMI 40-49.9 (MORBID OBESITY) (HCC): ICD-10-CM

## 2023-03-21 DIAGNOSIS — R63.5 ABNORMAL WEIGHT GAIN: ICD-10-CM

## 2023-03-23 LAB

## 2023-03-25 LAB
BASOPHILS # BLD AUTO: 69 CELLS/UL (ref 0–200)
BASOPHILS NFR BLD AUTO: 0.9 %
EOSINOPHIL # BLD AUTO: 246 CELLS/UL (ref 15–500)
EOSINOPHIL NFR BLD AUTO: 3.2 %
ERYTHROCYTE [DISTWIDTH] IN BLOOD BY AUTOMATED COUNT: 13.1 % (ref 11–15)
HCT VFR BLD AUTO: 41.3 % (ref 35–45)
HGB BLD-MCNC: 13.7 G/DL (ref 11.7–15.5)
LYMPHOCYTES # BLD AUTO: 2349 CELLS/UL (ref 850–3900)
LYMPHOCYTES NFR BLD AUTO: 30.5 %
MCH RBC QN AUTO: 28.4 PG (ref 27–33)
MCHC RBC AUTO-ENTMCNC: 33.2 G/DL (ref 32–36)
MCV RBC AUTO: 85.7 FL (ref 80–100)
MONOCYTES # BLD AUTO: 547 CELLS/UL (ref 200–950)
MONOCYTES NFR BLD AUTO: 7.1 %
NEUTROPHILS # BLD AUTO: 4489 CELLS/UL (ref 1500–7800)
NEUTROPHILS NFR BLD AUTO: 58.3 %
PLATELET # BLD AUTO: 289 THOUSAND/UL (ref 140–400)
PMV BLD REES-ECKER: 11 FL (ref 7.5–12.5)
RBC # BLD AUTO: 4.82 MILLION/UL (ref 3.8–5.1)
WBC # BLD AUTO: 7.7 THOUSAND/UL (ref 3.8–10.8)

## 2023-03-28 ENCOUNTER — PREP FOR PROCEDURE (OUTPATIENT)
Dept: BARIATRICS | Facility: CLINIC | Age: 43
End: 2023-03-28

## 2023-03-28 DIAGNOSIS — E66.01 MORBID OBESITY (HCC): Primary | ICD-10-CM

## 2023-03-28 DIAGNOSIS — Z99.89 OSA ON CPAP: ICD-10-CM

## 2023-03-28 DIAGNOSIS — G47.33 OSA ON CPAP: ICD-10-CM

## 2023-03-28 DIAGNOSIS — E78.5 HYPERLIPIDEMIA, UNSPECIFIED HYPERLIPIDEMIA TYPE: ICD-10-CM

## 2023-03-28 LAB

## 2023-03-29 ENCOUNTER — TELEPHONE (OUTPATIENT)
Dept: SLEEP CENTER | Facility: CLINIC | Age: 43
End: 2023-03-29

## 2023-03-30 NOTE — TELEPHONE ENCOUNTER
Pt  was set up 3/28 in [address] [place] [address] on [employer] S10 PAP 14-17cm and mask from sleep study F20 (S)

## 2023-04-06 ENCOUNTER — CLINICAL SUPPORT (OUTPATIENT)
Dept: BARIATRICS | Facility: CLINIC | Age: 43
End: 2023-04-06

## 2023-04-06 ENCOUNTER — OFFICE VISIT (OUTPATIENT)
Dept: BARIATRICS | Facility: CLINIC | Age: 43
End: 2023-04-06

## 2023-04-06 VITALS
OXYGEN SATURATION: 96 % | HEART RATE: 81 BPM | HEIGHT: 62 IN | SYSTOLIC BLOOD PRESSURE: 130 MMHG | WEIGHT: 290.5 LBS | BODY MASS INDEX: 53.46 KG/M2 | TEMPERATURE: 100 F | DIASTOLIC BLOOD PRESSURE: 92 MMHG

## 2023-04-06 DIAGNOSIS — E66.01 OBESITY, CLASS III, BMI 40-49.9 (MORBID OBESITY) (HCC): Primary | ICD-10-CM

## 2023-04-06 DIAGNOSIS — E66.01 CLASS 3 SEVERE OBESITY DUE TO EXCESS CALORIES WITHOUT SERIOUS COMORBIDITY WITH BODY MASS INDEX (BMI) OF 50.0 TO 59.9 IN ADULT (HCC): Primary | ICD-10-CM

## 2023-04-06 DIAGNOSIS — R73.03 PREDIABETES: ICD-10-CM

## 2023-04-06 DIAGNOSIS — G47.33 OSA (OBSTRUCTIVE SLEEP APNEA): ICD-10-CM

## 2023-04-06 DIAGNOSIS — E78.5 HYPERLIPIDEMIA, UNSPECIFIED HYPERLIPIDEMIA TYPE: ICD-10-CM

## 2023-04-06 DIAGNOSIS — Z98.84 BARIATRIC SURGERY STATUS: ICD-10-CM

## 2023-04-06 LAB
COTININE SERPL-MCNC: <2 NG/ML
NICOTINE SERPL-MCNC: <2 NG/ML

## 2023-04-06 RX ORDER — GABAPENTIN 300 MG/1
600 CAPSULE ORAL ONCE
OUTPATIENT
Start: 2023-04-06 | End: 2023-04-06

## 2023-04-06 RX ORDER — METRONIDAZOLE 500 MG/100ML
500 INJECTION, SOLUTION INTRAVENOUS ONCE
OUTPATIENT
Start: 2023-04-06 | End: 2023-04-06

## 2023-04-06 RX ORDER — HEPARIN SODIUM 5000 [USP'U]/ML
5000 INJECTION, SOLUTION INTRAVENOUS; SUBCUTANEOUS
OUTPATIENT
Start: 2023-04-07 | End: 2023-04-08

## 2023-04-06 RX ORDER — SCOLOPAMINE TRANSDERMAL SYSTEM 1 MG/1
1 PATCH, EXTENDED RELEASE TRANSDERMAL ONCE
OUTPATIENT
Start: 2023-04-06 | End: 2023-04-06

## 2023-04-06 RX ORDER — ACETAMINOPHEN 325 MG/1
975 TABLET ORAL ONCE
OUTPATIENT
Start: 2023-04-06 | End: 2023-04-06

## 2023-04-06 RX ORDER — CELECOXIB 200 MG/1
200 CAPSULE ORAL ONCE
OUTPATIENT
Start: 2023-04-06 | End: 2023-04-06

## 2023-04-06 NOTE — PROGRESS NOTES
"BARIATRIC HISTORY AND PHYSICAL - BARIATRIC SURGERY  Gael Geronimo 36 y o  female MRN: [id number]  Unit/Bed#:  Encounter: [id number]      HPI:  Gael Geronimo is a 36 y o  female who presents to review their preoperative workup and see if they are a good candidate to undergo a bariatric procedure  Review of Systems   Constitutional: Negative for chills and fever  HENT: Negative for ear pain and sore throat  Eyes: Negative for pain and visual disturbance  Respiratory: Negative for cough and shortness of breath  Cardiovascular: Negative for chest pain and palpitations  Gastrointestinal: Negative for abdominal pain and vomiting  Genitourinary: Negative for dysuria and hematuria  Musculoskeletal: Negative for arthralgias and back pain  Skin: Negative for color change and rash  Neurological: Negative for seizures and syncope  All other systems reviewed and are negative        Historical Information   Past Medical History:   Diagnosis Date   • Anxiety    • Hashimoto's thyroiditis    • Hyperlipidemia    • Obesity      Past Surgical History:   Procedure Laterality Date   •  SECTION     • WISDOM TOOTH EXTRACTION       Social History   Social History     Substance and Sexual Activity   Alcohol Use Yes    Comment: Social     Social History     Substance and Sexual Activity   Drug Use Never     Social History     Tobacco Use   Smoking Status Former   • Packs/day: 0 25   • Years: 10 00   • Pack years: 2 50   • Types: Cigarettes   • Quit date: 2013   • Years since quittin 7   Smokeless Tobacco Never     Family History: non-contributory    Meds/Allergies   all medications and allergies reviewed  No Known Allergies    Objective     Current Vitals:   Blood Pressure: 130/92 (23 1402)  Pulse: 81 (23 1402)  Temperature: 100 °F (37 8 °C) (23 1402)  Temp Source: Tympanic (23 1402)  Height: 5' 1 5\" (156 2 cm) (23 1402)  Weight - Scale: 132 kg (290 lb 8 oz) (23 " 1402)  SpO2: 96 % (04/06/23 1402)  bowel movement  [unfilled]    Invasive Devices     None                 Physical Exam  Constitutional:       Appearance: Normal appearance  She is obese  HENT:      Head: Normocephalic and atraumatic  Nose: Nose normal       Mouth/Throat:      Mouth: Mucous membranes are moist    Eyes:      Extraocular Movements: Extraocular movements intact  Pupils: Pupils are equal, round, and reactive to light  Cardiovascular:      Rate and Rhythm: Normal rate and regular rhythm  Pulses: Normal pulses  Heart sounds: Normal heart sounds  Pulmonary:      Effort: Pulmonary effort is normal       Breath sounds: Normal breath sounds  Abdominal:      Palpations: Abdomen is soft  Musculoskeletal:      Cervical back: Normal range of motion  Skin:     General: Skin is warm  Neurological:      General: No focal deficit present  Mental Status: She is alert and oriented to person, place, and time  Psychiatric:         Mood and Affect: Mood normal          Behavior: Behavior normal          Lab Results: I have personally reviewed pertinent lab results  Imaging: I have personally reviewed pertinent reports  EKG, Pathology, and Other Studies: I have personally reviewed pertinent reports  [id number]  [address] [place] [address]  [phone]        DATE OF SERVICE:  2/15/23     PHYSICIAN(S):  Attending:   Juana Gonzalez MD      Fellow:   No Staff Documented         INDICATION:  Bariatric surgery status     POST-OP DIAGNOSIS:  See the impression below      PREPROCEDURE:  Informed consent was obtained for the procedure, including sedation  Risks of perforation, hemorrhage, adverse drug reaction and aspiration were discussed  The patient was placed in the left lateral decubitus position      Patient was explained about the risks and benefits of the procedure   Risks including but not limited to bleeding, infection, and perforation were explained in detail  Also explained about less than 100% sensitivity with the exam and other alternatives      PROCEDURE: EGD     DETAILS OF PROCEDURE:   Patient was taken to the procedure room where a time out was performed to confirm correct patient and correct procedure  The patient underwent monitored anesthesia care, which was administered by an anesthesia professional  The patient's blood pressure, heart rate, level of consciousness, respirations and oxygen were monitored throughout the procedure  The scope was advanced to the second part of the duodenum  Retroflexion was performed in the fundus  The patient experienced no blood loss  The procedure was not difficult  The patient tolerated the procedure well  There were no apparent complications       ANESTHESIA INFORMATION:  ASA: III  Anesthesia Type: IV Sedation with Anesthesia     MEDICATIONS:  No administrations occurring from 1029 to 1034 on 02/15/23         FINDINGS:  • Regular Z-line 36 cm from the incisors  • Performed single forceps biopsy to rule out H  pylori in the antrum  • Small sliding hiatal hernia (type I hiatal hernia) - GE junction 36 cm from the incisors, diaphragmatic impression 37 cm from the incisors  • The stomach and 2nd part of the duodenum appeared normal   • Abnormal mucosa in the lower third of the esophagus  Esophagitis LA grade A        SPECIMENS:  ID Type Source Tests Collected by Time Destination   1 : r/o h pylori Tissue Stomach TISSUE EXAM Amy Christianson MD 2/15/2023 1036              IMPRESSION:  Mild esophagitis  Small hiatal hernia  Otherwise normal findings     RECOMMENDATION:    There is no recommended follow-up for this procedure         Ramírez Reeves MD        Final Diagnosis   A   Stomach, biopsy:  -Benign gastric antral mucosa with mild chronic inflammation and reactive change   -Negative for Helicobacter pylori, by H&E stain   -Negative for atrophy, intestinal metaplasia, dysplasia or carcinoma  Code Status: [unfilled]  Advance Directive and Living Will:      Power of :    POLST:        Assessment/Plan:    The patient presented to review the preoperative workup and see if bariatric surgery is appropriate and indicated following the extensive preoperative workup and the enrollment in our weight loss program   Preoperative workup was complete  Results were reviewed with the patient including the blood work results and the endoscopy findings and the biopsy results  We also reviewed the cardiology evaluation  The patient was determined to be a good candidate for Laparoscopic RNYGB with robotic assist     Small HH  ----------------------------------------------------------------------  Smoking: Quit 6 months ago, nicotine test pending  Blood thinner use: Denies  Home pain medication use and who manages it: Denies  CPAP use: Yes (If yes, sleep study obtained and reminded pt to bring CPAP to hospital)  History of blood clots: Denies  Previous abdominal surgeries:   Cardiac clearance obtained: Yes   EKG performed: 22  EGD prior to surgery: Yes, small HH  Screening Labs obtained (CBC, CMP): 22  H  Pylori status: Negative  Medication allergies: NKDA  SLIM consult Post-Op: No, just on metformin  Reminded patient about 2 week pre-op liquid diet: Yes  10% body weight loss pre-operatively met/discussed: Yes  Consent signed: Yes  Pre-Op ERAS Orders placed: DONE  -----------------------------------------------------------------------  Risks and benefits explained one more time to the patient  Alternatives to surgery and alternative forms of surgery were also explained  Post-surgical commitment and after care programs were explained  Consent was signed  Questions were answered and concerns were addressed  Patient will need to start the 2 week liquid diet prior to surgery  Patient wishes to proceed   As per [facility] guidelines, I had a discussion with the patient regarding their CODE STATUS in the perioperative period and the patient is level 1 or FULL CODE STATUS      Beth Mares PA-C  Bariatric Surgery   4/6/2023  2:17 PM

## 2023-04-06 NOTE — H&P (VIEW-ONLY)
"BARIATRIC HISTORY AND PHYSICAL - BARIATRIC SURGERY  Iveth Louis 36 y o  female MRN: [id number]  Unit/Bed#:  Encounter: [id number]      HPI:  Iveth Louis is a 36 y o  female who presents to review their preoperative workup and see if they are a good candidate to undergo a bariatric procedure  Review of Systems   Constitutional: Negative for chills and fever  HENT: Negative for ear pain and sore throat  Eyes: Negative for pain and visual disturbance  Respiratory: Negative for cough and shortness of breath  Cardiovascular: Negative for chest pain and palpitations  Gastrointestinal: Negative for abdominal pain and vomiting  Genitourinary: Negative for dysuria and hematuria  Musculoskeletal: Negative for arthralgias and back pain  Skin: Negative for color change and rash  Neurological: Negative for seizures and syncope  All other systems reviewed and are negative        Historical Information   Past Medical History:   Diagnosis Date   • Anxiety    • Hashimoto's thyroiditis    • Hyperlipidemia    • Obesity      Past Surgical History:   Procedure Laterality Date   •  SECTION     • WISDOM TOOTH EXTRACTION       Social History   Social History     Substance and Sexual Activity   Alcohol Use Yes    Comment: Social     Social History     Substance and Sexual Activity   Drug Use Never     Social History     Tobacco Use   Smoking Status Former   • Packs/day: 0 25   • Years: 10 00   • Pack years: 2 50   • Types: Cigarettes   • Quit date: 2013   • Years since quittin 7   Smokeless Tobacco Never     Family History: non-contributory    Meds/Allergies   all medications and allergies reviewed  No Known Allergies    Objective     Current Vitals:   Blood Pressure: 130/92 (23 1402)  Pulse: 81 (23 1402)  Temperature: 100 °F (37 8 °C) (23 1402)  Temp Source: Tympanic (23 1402)  Height: 5' 1 5\" (156 2 cm) (23 1402)  Weight - Scale: 132 kg (290 lb 8 oz) (23 " 1402)  SpO2: 96 % (04/06/23 1402)  bowel movement  [unfilled]    Invasive Devices     None                 Physical Exam  Constitutional:       Appearance: Normal appearance  She is obese  HENT:      Head: Normocephalic and atraumatic  Nose: Nose normal       Mouth/Throat:      Mouth: Mucous membranes are moist    Eyes:      Extraocular Movements: Extraocular movements intact  Pupils: Pupils are equal, round, and reactive to light  Cardiovascular:      Rate and Rhythm: Normal rate and regular rhythm  Pulses: Normal pulses  Heart sounds: Normal heart sounds  Pulmonary:      Effort: Pulmonary effort is normal       Breath sounds: Normal breath sounds  Abdominal:      Palpations: Abdomen is soft  Musculoskeletal:      Cervical back: Normal range of motion  Skin:     General: Skin is warm  Neurological:      General: No focal deficit present  Mental Status: She is alert and oriented to person, place, and time  Psychiatric:         Mood and Affect: Mood normal          Behavior: Behavior normal          Lab Results: I have personally reviewed pertinent lab results  Imaging: I have personally reviewed pertinent reports  EKG, Pathology, and Other Studies: I have personally reviewed pertinent reports  [address]  [place] [id number]  [phone]        DATE OF SERVICE:  2/15/23     PHYSICIAN(S):  Attending:   Juana Gonzalez MD      Fellow:   No Staff Documented         INDICATION:  Bariatric surgery status     POST-OP DIAGNOSIS:  See the impression below      PREPROCEDURE:  Informed consent was obtained for the procedure, including sedation  Risks of perforation, hemorrhage, adverse drug reaction and aspiration were discussed  The patient was placed in the left lateral decubitus position      Patient was explained about the risks and benefits of the procedure   Risks including but not limited to bleeding, infection, and perforation were explained in detail  Also explained about less than 100% sensitivity with the exam and other alternatives      PROCEDURE: EGD     DETAILS OF PROCEDURE:   Patient was taken to the procedure room where a time out was performed to confirm correct patient and correct procedure  The patient underwent monitored anesthesia care, which was administered by an anesthesia professional  The patient's blood pressure, heart rate, level of consciousness, respirations and oxygen were monitored throughout the procedure  The scope was advanced to the second part of the duodenum  Retroflexion was performed in the fundus  The patient experienced no blood loss  The procedure was not difficult  The patient tolerated the procedure well  There were no apparent complications       ANESTHESIA INFORMATION:  ASA: III  Anesthesia Type: IV Sedation with Anesthesia     MEDICATIONS:  No administrations occurring from 1029 to 1034 on 02/15/23         FINDINGS:  • Regular Z-line 36 cm from the incisors  • Performed single forceps biopsy to rule out H  pylori in the antrum  • Small sliding hiatal hernia (type I hiatal hernia) - GE junction 36 cm from the incisors, diaphragmatic impression 37 cm from the incisors  • The stomach and 2nd part of the duodenum appeared normal   • Abnormal mucosa in the lower third of the esophagus  Esophagitis LA grade A        SPECIMENS:  ID Type Source Tests Collected by Time Destination   1 : r/o h pylori Tissue Stomach TISSUE EXAM Juanpablo Noel MD 2/15/2023 1036              IMPRESSION:  Mild esophagitis  Small hiatal hernia  Otherwise normal findings     RECOMMENDATION:    There is no recommended follow-up for this procedure         Jaymie Deluca MD        Final Diagnosis   A   Stomach, biopsy:  -Benign gastric antral mucosa with mild chronic inflammation and reactive change   -Negative for Helicobacter pylori, by H&E stain   -Negative for atrophy, intestinal metaplasia, dysplasia or carcinoma  Code Status: [unfilled]  Advance Directive and Living Will:      Power of :    POLST:        Assessment/Plan:    The patient presented to review the preoperative workup and see if bariatric surgery is appropriate and indicated following the extensive preoperative workup and the enrollment in our weight loss program   Preoperative workup was complete  Results were reviewed with the patient including the blood work results and the endoscopy findings and the biopsy results  We also reviewed the cardiology evaluation  The patient was determined to be a good candidate for Laparoscopic RNYGB with robotic assist     Small HH  ----------------------------------------------------------------------  Smoking: Quit 6 months ago, nicotine test pending  Blood thinner use: Denies  Home pain medication use and who manages it: Denies  CPAP use: Yes (If yes, sleep study obtained and reminded pt to bring CPAP to hospital)  History of blood clots: Denies  Previous abdominal surgeries:   Cardiac clearance obtained: Yes   EKG performed: 22  EGD prior to surgery: Yes, small HH  Screening Labs obtained (CBC, CMP): 22  H  Pylori status: Negative  Medication allergies: NKDA  SLIM consult Post-Op: No, just on metformin  Reminded patient about 2 week pre-op liquid diet: Yes  10% body weight loss pre-operatively met/discussed: Yes  Consent signed: Yes  Pre-Op ERAS Orders placed: DONE  -----------------------------------------------------------------------  Risks and benefits explained one more time to the patient  Alternatives to surgery and alternative forms of surgery were also explained  Post-surgical commitment and after care programs were explained  Consent was signed  Questions were answered and concerns were addressed  Patient will need to start the 2 week liquid diet prior to surgery  Patient wishes to proceed   As per [facility] guidelines, I had a discussion with the patient regarding their CODE STATUS in the perioperative period and the patient is level 1 or FULL CODE STATUS      Taylor Jason PA-C  Bariatric Surgery   4/6/2023  2:17 PM

## 2023-04-10 RX ORDER — CHOLECALCIFEROL (VITAMIN D3) 125 MCG
2000 CAPSULE ORAL DAILY
COMMUNITY

## 2023-04-10 RX ORDER — MULTIVIT WITH IRON,MINERALS
TABLET,CHEWABLE ORAL
COMMUNITY

## 2023-04-10 NOTE — PRE-PROCEDURE INSTRUCTIONS
Pre-Surgery Instructions:   Medication Instructions   • buPROPion (WELLBUTRIN XL) 150 mg 24 hr tablet Take day of surgery  • Cholecalciferol (Vitamin D3) 50 MCG (2000 UT) TABS Hold day of surgery  • levonorgestrel (MIRENA) 20 MCG/24HR IUD in place   • levothyroxine 50 mcg tablet Take day of surgery  • metFORMIN (GLUCOPHAGE-XR) 750 mg 24 hr tablet Hold day of surgery  • omeprazole (PriLOSEC) 20 mg delayed release capsule Hold day of surgery  • Pediatric Multivitamins-Iron (Flintstones Complete) 10 MG CHEW Stop taking 7 days prior to surgery  Medication instructions for day surgery reviewed  Please use only a sip of water to take your instructed medications  Avoid all over the counter vitamins, supplements and NSAIDS for one week prior to surgery per anesthesia guidelines  Tylenol is ok to take as needed  You will receive a call one business day prior to surgery with an arrival time and hospital directions  If your surgery is scheduled on a Monday, the hospital will be calling you on the Friday prior to your surgery  If you have not heard from anyone by 8pm, please call the hospital supervisor through the hospital  at [phone]  Caryle Lawrence [phone])  Do not eat or drink anything after midnight the night before your surgery, including candy, mints, lifesavers, or chewing gum  Do not drink alcohol 24hrs before your surgery  Try not to smoke at least 24hrs before your surgery  Follow the pre surgery showering instructions as listed in the [facility] Surgical Experience Booklet” or otherwise provided by your surgeon's office  Do not shave the surgical area 24 hours before surgery  Do not apply any lotions, creams, including makeup, cologne, deodorant, or perfumes after showering on the day of your surgery  No contact lenses, eye make-up, or artificial eyelashes  Remove nail polish, including gel polish, and any artificial, gel, or acrylic nails if possible   Remove all jewelry including rings and body piercing jewelry  Wear causal clothing that is easy to take on and off  Consider your type of surgery  Keep any valuables, jewelry, piercings at home  Please bring any specially ordered equipment (sling, braces) if indicated  Arrange for a responsible person to drive you to and from the hospital on the day of your surgery  Visitor Guidelines discussed  Call the surgeon's office with any new illnesses, exposures, or additional questions prior to surgery  Please reference your [facility] Surgical Experience Booklet” for additional information to prepare for your upcoming surgery

## 2023-04-21 ENCOUNTER — ANESTHESIA EVENT (OUTPATIENT)
Dept: PERIOP | Facility: HOSPITAL | Age: 43
End: 2023-04-21

## 2023-04-24 ENCOUNTER — HOSPITAL ENCOUNTER (INPATIENT)
Facility: HOSPITAL | Age: 43
LOS: 1 days | Discharge: HOME | End: 2023-04-25
Attending: SURGERY | Admitting: SURGERY

## 2023-04-24 ENCOUNTER — ANESTHESIA (OUTPATIENT)
Dept: PERIOP | Facility: HOSPITAL | Age: 43
End: 2023-04-24

## 2023-04-24 DIAGNOSIS — E78.5 HYPERLIPIDEMIA, UNSPECIFIED HYPERLIPIDEMIA TYPE: ICD-10-CM

## 2023-04-24 DIAGNOSIS — E66.01 MORBID OBESITY (HCC): ICD-10-CM

## 2023-04-24 DIAGNOSIS — Z99.89 OSA ON CPAP: ICD-10-CM

## 2023-04-24 DIAGNOSIS — G47.33 OSA ON CPAP: ICD-10-CM

## 2023-04-24 LAB
EXT PREGNANCY TEST URINE: NEGATIVE
EXT. CONTROL: NORMAL
GLUCOSE SERPL-MCNC: 107 MG/DL (ref 65–140)
GLUCOSE SERPL-MCNC: 163 MG/DL (ref 65–140)

## 2023-04-24 PROCEDURE — 0D164ZA BYPASS STOMACH TO JEJUNUM, PERCUTANEOUS ENDOSCOPIC APPROACH: ICD-10-PCS | Performed by: SURGERY

## 2023-04-24 PROCEDURE — 0DJ08ZZ INSPECTION OF UPPER INTESTINAL TRACT, VIA NATURAL OR ARTIFICIAL OPENING ENDOSCOPIC: ICD-10-PCS | Performed by: SURGERY

## 2023-04-24 PROCEDURE — 8E0W4CZ ROBOTIC ASSISTED PROCEDURE OF TRUNK REGION, PERCUTANEOUS ENDOSCOPIC APPROACH: ICD-10-PCS | Performed by: SURGERY

## 2023-04-24 DEVICE — SEAMGUARD STPL REINF INTUITIVE SUREFORM 60 GREEN: Type: IMPLANTABLE DEVICE | Status: FUNCTIONAL

## 2023-04-24 RX ORDER — PROPOFOL 10 MG/ML
INJECTION, EMULSION INTRAVENOUS AS NEEDED
Status: DISCONTINUED | OUTPATIENT
Start: 2023-04-24 | End: 2023-04-24

## 2023-04-24 RX ORDER — OXYCODONE HCL 5 MG/5 ML
5 SOLUTION, ORAL ORAL EVERY 4 HOURS PRN
Status: DISCONTINUED | OUTPATIENT
Start: 2023-04-24 | End: 2023-04-25 | Stop reason: HOSPADM

## 2023-04-24 RX ORDER — GLYCOPYRROLATE 0.2 MG/ML
INJECTION INTRAMUSCULAR; INTRAVENOUS AS NEEDED
Status: DISCONTINUED | OUTPATIENT
Start: 2023-04-24 | End: 2023-04-24

## 2023-04-24 RX ORDER — ACETAMINOPHEN 325 MG/1
975 TABLET ORAL ONCE
Status: COMPLETED | OUTPATIENT
Start: 2023-04-24 | End: 2023-04-24

## 2023-04-24 RX ORDER — MORPHINE SULFATE 4 MG/ML
4 INJECTION, SOLUTION INTRAMUSCULAR; INTRAVENOUS EVERY 4 HOURS PRN
Status: DISCONTINUED | OUTPATIENT
Start: 2023-04-24 | End: 2023-04-25 | Stop reason: HOSPADM

## 2023-04-24 RX ORDER — LIDOCAINE HYDROCHLORIDE 20 MG/ML
INJECTION, SOLUTION EPIDURAL; INFILTRATION; INTRACAUDAL; PERINEURAL AS NEEDED
Status: DISCONTINUED | OUTPATIENT
Start: 2023-04-24 | End: 2023-04-24

## 2023-04-24 RX ORDER — BUPROPION HYDROCHLORIDE 150 MG/1
150 TABLET ORAL EVERY MORNING
Status: DISCONTINUED | OUTPATIENT
Start: 2023-04-24 | End: 2023-04-25 | Stop reason: HOSPADM

## 2023-04-24 RX ORDER — SODIUM CHLORIDE, SODIUM LACTATE, POTASSIUM CHLORIDE, CALCIUM CHLORIDE 600; 310; 30; 20 MG/100ML; MG/100ML; MG/100ML; MG/100ML
100 INJECTION, SOLUTION INTRAVENOUS CONTINUOUS
Status: DISCONTINUED | OUTPATIENT
Start: 2023-04-24 | End: 2023-04-25 | Stop reason: HOSPADM

## 2023-04-24 RX ORDER — MORPHINE SULFATE 10 MG/ML
4 INJECTION, SOLUTION INTRAMUSCULAR; INTRAVENOUS EVERY 4 HOURS PRN
Status: DISCONTINUED | OUTPATIENT
Start: 2023-04-24 | End: 2023-04-24

## 2023-04-24 RX ORDER — NEOSTIGMINE METHYLSULFATE 1 MG/ML
INJECTION INTRAVENOUS AS NEEDED
Status: DISCONTINUED | OUTPATIENT
Start: 2023-04-24 | End: 2023-04-24

## 2023-04-24 RX ORDER — GABAPENTIN 300 MG/1
600 CAPSULE ORAL ONCE
Status: COMPLETED | OUTPATIENT
Start: 2023-04-24 | End: 2023-04-24

## 2023-04-24 RX ORDER — SCOLOPAMINE TRANSDERMAL SYSTEM 1 MG/1
1 PATCH, EXTENDED RELEASE TRANSDERMAL ONCE
Status: DISCONTINUED | OUTPATIENT
Start: 2023-04-24 | End: 2023-04-25 | Stop reason: HOSPADM

## 2023-04-24 RX ORDER — MAGNESIUM HYDROXIDE 1200 MG/15ML
LIQUID ORAL AS NEEDED
Status: DISCONTINUED | OUTPATIENT
Start: 2023-04-24 | End: 2023-04-24 | Stop reason: HOSPADM

## 2023-04-24 RX ORDER — MIDAZOLAM HYDROCHLORIDE 2 MG/2ML
INJECTION, SOLUTION INTRAMUSCULAR; INTRAVENOUS AS NEEDED
Status: DISCONTINUED | OUTPATIENT
Start: 2023-04-24 | End: 2023-04-24

## 2023-04-24 RX ORDER — HYDROMORPHONE HCL/PF 1 MG/ML
0.5 SYRINGE (ML) INJECTION
Status: DISCONTINUED | OUTPATIENT
Start: 2023-04-24 | End: 2023-04-24 | Stop reason: HOSPADM

## 2023-04-24 RX ORDER — FAMOTIDINE 10 MG/ML
20 INJECTION, SOLUTION INTRAVENOUS 2 TIMES DAILY
Status: DISCONTINUED | OUTPATIENT
Start: 2023-04-24 | End: 2023-04-24

## 2023-04-24 RX ORDER — FAMOTIDINE 10 MG/ML
20 INJECTION, SOLUTION INTRAVENOUS 2 TIMES DAILY
Status: DISCONTINUED | OUTPATIENT
Start: 2023-04-24 | End: 2023-04-25 | Stop reason: HOSPADM

## 2023-04-24 RX ORDER — CELECOXIB 200 MG/1
200 CAPSULE ORAL ONCE
Status: COMPLETED | OUTPATIENT
Start: 2023-04-24 | End: 2023-04-24

## 2023-04-24 RX ORDER — ONDANSETRON 2 MG/ML
4 INJECTION INTRAMUSCULAR; INTRAVENOUS EVERY 6 HOURS PRN
Status: DISCONTINUED | OUTPATIENT
Start: 2023-04-24 | End: 2023-04-25 | Stop reason: HOSPADM

## 2023-04-24 RX ORDER — METOCLOPRAMIDE HYDROCHLORIDE 5 MG/ML
10 INJECTION INTRAMUSCULAR; INTRAVENOUS EVERY 6 HOURS PRN
Status: DISCONTINUED | OUTPATIENT
Start: 2023-04-24 | End: 2023-04-25 | Stop reason: HOSPADM

## 2023-04-24 RX ORDER — ONDANSETRON 2 MG/ML
INJECTION INTRAMUSCULAR; INTRAVENOUS AS NEEDED
Status: DISCONTINUED | OUTPATIENT
Start: 2023-04-24 | End: 2023-04-24

## 2023-04-24 RX ORDER — DIPHENHYDRAMINE HCL 25 MG
25 TABLET ORAL EVERY 8 HOURS PRN
Status: DISCONTINUED | OUTPATIENT
Start: 2023-04-24 | End: 2023-04-25 | Stop reason: HOSPADM

## 2023-04-24 RX ORDER — SODIUM CHLORIDE 9 MG/ML
125 INJECTION, SOLUTION INTRAVENOUS CONTINUOUS
Status: DISCONTINUED | OUTPATIENT
Start: 2023-04-24 | End: 2023-04-25 | Stop reason: HOSPADM

## 2023-04-24 RX ORDER — ONDANSETRON 2 MG/ML
4 INJECTION INTRAMUSCULAR; INTRAVENOUS ONCE AS NEEDED
Status: COMPLETED | OUTPATIENT
Start: 2023-04-24 | End: 2023-04-24

## 2023-04-24 RX ORDER — DEXMEDETOMIDINE HYDROCHLORIDE 100 UG/ML
INJECTION, SOLUTION INTRAVENOUS AS NEEDED
Status: DISCONTINUED | OUTPATIENT
Start: 2023-04-24 | End: 2023-04-24

## 2023-04-24 RX ORDER — HEPARIN SODIUM 5000 [USP'U]/ML
5000 INJECTION, SOLUTION INTRAVENOUS; SUBCUTANEOUS
Status: COMPLETED | OUTPATIENT
Start: 2023-04-24 | End: 2023-04-24

## 2023-04-24 RX ORDER — OXYCODONE HCL 5 MG/5 ML
10 SOLUTION, ORAL ORAL EVERY 4 HOURS PRN
Status: DISCONTINUED | OUTPATIENT
Start: 2023-04-24 | End: 2023-04-25 | Stop reason: HOSPADM

## 2023-04-24 RX ORDER — ACETAMINOPHEN 160 MG/5ML
975 SUSPENSION, ORAL (FINAL DOSE FORM) ORAL EVERY 8 HOURS
Status: DISCONTINUED | OUTPATIENT
Start: 2023-04-24 | End: 2023-04-25 | Stop reason: HOSPADM

## 2023-04-24 RX ORDER — METRONIDAZOLE 500 MG/100ML
500 INJECTION, SOLUTION INTRAVENOUS ONCE
Status: COMPLETED | OUTPATIENT
Start: 2023-04-24 | End: 2023-04-24

## 2023-04-24 RX ORDER — SODIUM CHLORIDE, SODIUM LACTATE, POTASSIUM CHLORIDE, CALCIUM CHLORIDE 600; 310; 30; 20 MG/100ML; MG/100ML; MG/100ML; MG/100ML
INJECTION, SOLUTION INTRAVENOUS CONTINUOUS PRN
Status: DISCONTINUED | OUTPATIENT
Start: 2023-04-24 | End: 2023-04-24

## 2023-04-24 RX ORDER — PROMETHAZINE HYDROCHLORIDE 25 MG/ML
25 INJECTION, SOLUTION INTRAMUSCULAR; INTRAVENOUS EVERY 6 HOURS PRN
Status: DISCONTINUED | OUTPATIENT
Start: 2023-04-24 | End: 2023-04-25 | Stop reason: HOSPADM

## 2023-04-24 RX ORDER — ACETAMINOPHEN 325 MG/1
975 TABLET ORAL EVERY 8 HOURS
Status: DISCONTINUED | OUTPATIENT
Start: 2023-04-24 | End: 2023-04-25 | Stop reason: HOSPADM

## 2023-04-24 RX ORDER — SIMETHICONE 80 MG
80 TABLET,CHEWABLE ORAL 4 TIMES DAILY PRN
Status: DISCONTINUED | OUTPATIENT
Start: 2023-04-24 | End: 2023-04-25 | Stop reason: HOSPADM

## 2023-04-24 RX ORDER — FENTANYL CITRATE/PF 50 MCG/ML
50 SYRINGE (ML) INJECTION
Status: DISCONTINUED | OUTPATIENT
Start: 2023-04-24 | End: 2023-04-24 | Stop reason: HOSPADM

## 2023-04-24 RX ORDER — FENTANYL CITRATE 50 UG/ML
INJECTION, SOLUTION INTRAMUSCULAR; INTRAVENOUS AS NEEDED
Status: DISCONTINUED | OUTPATIENT
Start: 2023-04-24 | End: 2023-04-24

## 2023-04-24 RX ORDER — ROCURONIUM BROMIDE 10 MG/ML
INJECTION, SOLUTION INTRAVENOUS AS NEEDED
Status: DISCONTINUED | OUTPATIENT
Start: 2023-04-24 | End: 2023-04-24

## 2023-04-24 RX ORDER — DEXAMETHASONE SODIUM PHOSPHATE 10 MG/ML
INJECTION, SOLUTION INTRAMUSCULAR; INTRAVENOUS AS NEEDED
Status: DISCONTINUED | OUTPATIENT
Start: 2023-04-24 | End: 2023-04-24

## 2023-04-24 RX ORDER — METRONIDAZOLE 500 MG/100ML
500 INJECTION, SOLUTION INTRAVENOUS EVERY 8 HOURS
Status: COMPLETED | OUTPATIENT
Start: 2023-04-24 | End: 2023-04-25

## 2023-04-24 RX ADMIN — CELECOXIB 200 MG: 200 CAPSULE ORAL at 05:52

## 2023-04-24 RX ADMIN — CEFAZOLIN SODIUM 3000 MG: 10 INJECTION, POWDER, FOR SOLUTION INTRAVENOUS at 22:57

## 2023-04-24 RX ADMIN — METRONIDAZOLE 500 MG: 500 INJECTION, SOLUTION INTRAVENOUS at 23:43

## 2023-04-24 RX ADMIN — DEXAMETHASONE SODIUM PHOSPHATE 10 MG: 10 INJECTION INTRAMUSCULAR; INTRAVENOUS at 07:37

## 2023-04-24 RX ADMIN — DEXMEDETOMIDINE HCL 8 MCG: 100 INJECTION INTRAVENOUS at 07:39

## 2023-04-24 RX ADMIN — NEOSTIGMINE METHYLSULFATE 3 MG: 1 INJECTION INTRAVENOUS at 09:40

## 2023-04-24 RX ADMIN — ONDANSETRON 4 MG: 2 INJECTION INTRAMUSCULAR; INTRAVENOUS at 19:53

## 2023-04-24 RX ADMIN — METRONIDAZOLE 500 MG: 500 INJECTION, SOLUTION INTRAVENOUS at 16:32

## 2023-04-24 RX ADMIN — ACETAMINOPHEN 975 MG: 325 SUSPENSION ORAL at 19:53

## 2023-04-24 RX ADMIN — ONDANSETRON HYDROCHLORIDE 4 MG: 2 SOLUTION INTRAMUSCULAR; INTRAVENOUS at 10:19

## 2023-04-24 RX ADMIN — METRONIDAZOLE: 500 INJECTION, SOLUTION INTRAVENOUS at 07:42

## 2023-04-24 RX ADMIN — SODIUM CHLORIDE, SODIUM LACTATE, POTASSIUM CHLORIDE, AND CALCIUM CHLORIDE 100 ML/HR: .6; .31; .03; .02 INJECTION, SOLUTION INTRAVENOUS at 11:20

## 2023-04-24 RX ADMIN — MIDAZOLAM 2 MG: 1 INJECTION INTRAMUSCULAR; INTRAVENOUS at 07:23

## 2023-04-24 RX ADMIN — GABAPENTIN 600 MG: 300 CAPSULE ORAL at 05:52

## 2023-04-24 RX ADMIN — SODIUM CHLORIDE, SODIUM LACTATE, POTASSIUM CHLORIDE, AND CALCIUM CHLORIDE: .6; .31; .03; .02 INJECTION, SOLUTION INTRAVENOUS at 09:16

## 2023-04-24 RX ADMIN — HEPARIN SODIUM 5000 UNITS: 5000 INJECTION INTRAVENOUS; SUBCUTANEOUS at 05:52

## 2023-04-24 RX ADMIN — FENTANYL CITRATE 50 MCG: 50 INJECTION INTRAMUSCULAR; INTRAVENOUS at 09:13

## 2023-04-24 RX ADMIN — SODIUM CHLORIDE 125 ML/HR: 0.9 INJECTION, SOLUTION INTRAVENOUS at 06:08

## 2023-04-24 RX ADMIN — ONDANSETRON 4 MG: 2 INJECTION INTRAMUSCULAR; INTRAVENOUS at 09:40

## 2023-04-24 RX ADMIN — OXYCODONE HYDROCHLORIDE 5 MG: 5 SOLUTION ORAL at 22:57

## 2023-04-24 RX ADMIN — PROPOFOL 200 MG: 10 INJECTION, EMULSION INTRAVENOUS at 07:30

## 2023-04-24 RX ADMIN — FAMOTIDINE 20 MG: 10 INJECTION INTRAVENOUS at 21:31

## 2023-04-24 RX ADMIN — SODIUM CHLORIDE, SODIUM LACTATE, POTASSIUM CHLORIDE, AND CALCIUM CHLORIDE 100 ML/HR: .6; .31; .03; .02 INJECTION, SOLUTION INTRAVENOUS at 21:34

## 2023-04-24 RX ADMIN — LIDOCAINE HYDROCHLORIDE 100 MG: 20 INJECTION, SOLUTION EPIDURAL; INFILTRATION; INTRACAUDAL; PERINEURAL at 07:30

## 2023-04-24 RX ADMIN — Medication 3000 MG: at 07:23

## 2023-04-24 RX ADMIN — SODIUM CHLORIDE: 0.9 INJECTION, SOLUTION INTRAVENOUS at 08:09

## 2023-04-24 RX ADMIN — FENTANYL CITRATE 50 MCG: 50 INJECTION INTRAMUSCULAR; INTRAVENOUS at 07:30

## 2023-04-24 RX ADMIN — OXYCODONE HYDROCHLORIDE 5 MG: 5 SOLUTION ORAL at 14:42

## 2023-04-24 RX ADMIN — METOCLOPRAMIDE 10 MG: 5 INJECTION, SOLUTION INTRAMUSCULAR; INTRAVENOUS at 11:26

## 2023-04-24 RX ADMIN — CEFAZOLIN SODIUM 3000 MG: 10 INJECTION, POWDER, FOR SOLUTION INTRAVENOUS at 15:30

## 2023-04-24 RX ADMIN — ROCURONIUM BROMIDE 10 MG: 10 INJECTION, SOLUTION INTRAVENOUS at 08:38

## 2023-04-24 RX ADMIN — ACETAMINOPHEN 325MG 975 MG: 325 TABLET ORAL at 05:52

## 2023-04-24 RX ADMIN — FAMOTIDINE 20 MG: 10 INJECTION INTRAVENOUS at 11:26

## 2023-04-24 RX ADMIN — FENTANYL CITRATE 100 MCG: 50 INJECTION INTRAMUSCULAR; INTRAVENOUS at 09:42

## 2023-04-24 RX ADMIN — SCOPALAMINE 1 PATCH: 1 PATCH, EXTENDED RELEASE TRANSDERMAL at 05:52

## 2023-04-24 RX ADMIN — ROCURONIUM BROMIDE 50 MG: 10 INJECTION, SOLUTION INTRAVENOUS at 07:30

## 2023-04-24 RX ADMIN — GLYCOPYRROLATE 0.6 MG: 0.2 INJECTION INTRAMUSCULAR; INTRAVENOUS at 09:40

## 2023-04-24 NOTE — ANESTHESIA PREPROCEDURE EVALUATION
Procedure:  BYPASS GASTRIC R-N-Y  W ROBOT (Abdomen)    Relevant Problems   CARDIO   (+) Hyperlipidemia      ENDO   (+) Hypothyroidism                PULMONARY   (+) TAYLOR (obstructive sleep apnea)        Physical Exam    Airway    Mallampati score: II  TM Distance: >3 FB  Neck ROM: full     Dental       Cardiovascular  Rhythm: regular, Rate: normal,     Pulmonary  Breath sounds clear to auscultation,     Other Findings        Anesthesia Plan  ASA Score- 3     Anesthesia Type- general with ASA Monitors  Additional Monitors:   Airway Plan: ETT  Plan Factors-    Chart reviewed  Existing labs reviewed  Induction- intravenous  Postoperative Plan- Plan for postoperative opioid use  Planned trial extubation    Informed Consent- Anesthetic plan and risks discussed with patient

## 2023-04-24 NOTE — INTERVAL H&P NOTE
H&P reviewed  After examining the patient I find no changes in the patients condition since the H&P had been written      Vitals:    04/24/23 0547   BP: 130/68   Pulse: 85   Resp: 18   Temp: 98 7 °F (37 1 °C)   SpO2: 97%

## 2023-04-24 NOTE — ANESTHESIA POSTPROCEDURE EVALUATION
"Post-Op Assessment Note    CV Status:  Stable    Pain management: adequate     Mental Status:  Alert and awake   Hydration Status:  Euvolemic   PONV Controlled:  Controlled   Airway Patency:  Patent      Post Op Vitals Reviewed: Yes      Staff: Anesthesiologist         No notable events documented      BP      Temp      Pulse     Resp      SpO2      /77   Pulse 71   Temp 97 7 °F (36 5 °C)   Resp 15   Ht 5' 1 5\" (1 562 m)   Wt 129 kg (285 lb 4 4 oz)   LMP 04/03/2023 (Exact Date)   SpO2 98%   BMI 53 03 kg/m²     "

## 2023-04-24 NOTE — OP NOTE
OPERATIVE REPORT  PATIENT NAME: Neema Roemro    :  1980  MRN: [id number]  Pt Location: [facility] OR ROOM 08    SURGERY DATE: 2023    Surgeon(s) and Role:     * Sara Arroyo MD - Primary     * Tk Oliver DO - Assisting    Preop Diagnosis:  Morbid obesity ([facility] ) [E66 01]  TAYLOR on CPAP [G47 33, Z99 89]  Hyperlipidemia, unspecified hyperlipidemia type [E78 5]    Post-Op Diagnosis Codes:     * Morbid obesity ([facility] ) [E66 01]     * TAYLOR on CPAP [G47 33, Z99 89]     * Hyperlipidemia, unspecified hyperlipidemia type [E78 5]    Procedure(s):  BYPASS GASTRIC R-N-Y  W ROBOT    Specimen(s):  ID Type Source Tests Collected by Time Destination   1 : 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 EXAM Sara Arroyo MD 2023 [id number]        Estimated Blood Loss:   20 ml    Drains:  * No LDAs found *    Anesthesia Type:   General    Operative Indications: Morbid obesity ([facility] ) [E66 01]  TAYLOR on CPAP [G47 33, Z99 89]  Hyperlipidemia, unspecified hyperlipidemia type [E78 5]      Operative Findings:  Small bowel tumor 40 cm from the ligament of Treitz rule out GIST    Complications:   None    Procedure and Technique:  Robotic small bowel resection  Robotic Yesenia-en-Y gastric bypass with Intra-Op endoscopy   I was present for the entire procedure  Patient Disposition:  hemodynamically stable    No qualified resident was available  Assistant was necessary for instrument exchange and counter traction and assistance with stapling and intraop endoscopy    Indication:   Patient suffers from morbid obesity and associated co-morbidities  and failed to achieve any meaningful or sustainable weight loss and was therefore consented to undergo a laparoscopic Yesenia en Y Gastric Bypass  Risks and benefits were explained to the patient, patient consented for the procedure  The patient was brought to the operating room and placed in a supine position   The patient received a dose of IV antibiotics and a dose of subcutaneous Heparin prior to the procedure  The patient was induced under general endotracheal anesthesia  The abdominal wall was prepped and draped under sterile conditions in the usual fashion  The procedure was started by obtaining access to the abdominal cavity using a Veress needle to the left side of the midline around 6 inches from the xiphoid the abdominal cavity was insufflated with CO2 to a pressure of 15 mmHg  After that, the abdomen was entered with an 8 mm trocar using an Optiview trocar under direct visualization  At that point, an 8 and 12 mm robotic trocars were placed on the right side of the abdominal wall, under direct visualization, and another 8 mm robotic trocar and 12 mm assistant trocar were placed on the left side of the abdominal wall, also under direct visualization  A TAP block was performed using 20 ml of Exparel mixed with saline and 0 5 % Marcaine for a total of 100 ml  The patient was then placed in a reverse Trendelenburg position  A Guillermo retractor was placed through a small stab incision below the xiphoid and was used to retract the left lobe of the liver in a medial fashion, the robot was then docked and locked in place  An OG tube was placed to decompress the stomach and then removed immediately  The procedure was started by lifting the omentum in a cephalad fashion  The omentum was then split in half using the vessel sealer  The ligament of Treitz was identified and then the small bowel was run for approximately 40 cm at which time a small bowel tumor on the antimesenteric side was identified and a decision was made to resect the portion of the small bowel and sent to pathology, small bowel was transected with a 60 mm stapler using a white load  The mesentery of the small bowel was then transected using the vessel sealer,   After that, the distal small bowel was run for 100 cm in a way to obtain a 100 cm long Yesenia limb   At that point, two enterotomies were made in the BP limb and the Yesenia limb with hot scissors l, and the jejunojejunostomy was fashioned using a 60 mm stapler with a white load  After firing the stapler, the staple line was inspected and there was no evidence of bleeding and the staple line was well formed  The common enterotomy of the jejunojejunostomy was closed in two layers using 2-0 Vicryl running suture for the first layer and  2-0 V LOC in a running fashion for the second layer  The mesenteric defect of the small bowel was approximated using nonabsorbable suture in a running fashion  At that point, we turned our attention to the upper portion of the abdomen  The pars flaccida was opened and a gastric pouch was created with serial firings of the Sureform 60 mm intuitive  stapler using the compression feedback to guide the choice of cartridges  After the formation of the gastric pouch, the staple lines of the pouch and the gastric remnant were inspected and appeared to be well formed and also appeared to be hemostatic  A new gastrojejunostomy anastomosis was then fashioned in an antecolic antegastric fashion in 2 layers  Outer layer was a running layer of 2-0 V lock suture and the inner  layer was a running 2-0 Vicryl suture  Upper endoscopy was then performed and showed no evidence of bubbles or bleeding at the suture line of the anastomosis or the staple line of the gastric pouch  The gastrojejunostomy was covered with an omental flap that was secured in place with an absorbable suture in a simple fashion  The [facility] liver retractor was removed  The 12 mm port was closed  with 1-0 Vicryl in a simple fashion  All the skin edges of the trocar sites were approximated with 4-0 Monocryl in a subcuticular inverted fashion  The patient was extubated and transferred to the PACU in stable condition      SIGNATURE: Trent Kirkpatrick MD  DATE: April 24, 2023  TIME: 9:57 AM

## 2023-04-25 VITALS
HEIGHT: 62 IN | RESPIRATION RATE: 18 BRPM | HEART RATE: 56 BPM | BODY MASS INDEX: 52.5 KG/M2 | DIASTOLIC BLOOD PRESSURE: 76 MMHG | OXYGEN SATURATION: 97 % | TEMPERATURE: 98.3 F | WEIGHT: 285.27 LBS | SYSTOLIC BLOOD PRESSURE: 135 MMHG

## 2023-04-25 LAB
ANION GAP SERPL CALCULATED.3IONS-SCNC: 7 MMOL/L (ref 4–13)
BUN SERPL-MCNC: 6 MG/DL (ref 5–25)
CALCIUM SERPL-MCNC: 8.5 MG/DL (ref 8.4–10.2)
CHLORIDE SERPL-SCNC: 108 MMOL/L (ref 96–108)
CO2 SERPL-SCNC: 23 MMOL/L (ref 21–32)
CREAT SERPL-MCNC: 0.62 MG/DL (ref 0.6–1.3)
ERYTHROCYTE [DISTWIDTH] IN BLOOD BY AUTOMATED COUNT: 13.4 % (ref 11.6–15.1)
GFR SERPL CREATININE-BSD FRML MDRD: 110 ML/MIN/1.73SQ M
GLUCOSE SERPL-MCNC: 103 MG/DL (ref 65–140)
HCT VFR BLD AUTO: 36.7 % (ref 34.8–46.1)
HGB BLD-MCNC: 11.7 G/DL (ref 11.5–15.4)
MCH RBC QN AUTO: 28.3 PG (ref 26.8–34.3)
MCHC RBC AUTO-ENTMCNC: 31.9 G/DL (ref 31.4–37.4)
MCV RBC AUTO: 89 FL (ref 82–98)
PLATELET # BLD AUTO: 221 THOUSANDS/UL (ref 149–390)
PMV BLD AUTO: 10.9 FL (ref 8.9–12.7)
POTASSIUM SERPL-SCNC: 3.8 MMOL/L (ref 3.5–5.3)
RBC # BLD AUTO: 4.14 MILLION/UL (ref 3.81–5.12)
SODIUM SERPL-SCNC: 138 MMOL/L (ref 135–147)
WBC # BLD AUTO: 10.31 THOUSAND/UL (ref 4.31–10.16)

## 2023-04-25 RX ORDER — ONDANSETRON 4 MG/1
4 TABLET, ORALLY DISINTEGRATING ORAL EVERY 6 HOURS PRN
Qty: 10 TABLET | Refills: 0 | Status: SHIPPED | OUTPATIENT
Start: 2023-04-25

## 2023-04-25 RX ORDER — ACETAMINOPHEN 160 MG/5ML
975 SUSPENSION, ORAL (FINAL DOSE FORM) ORAL EVERY 8 HOURS
Qty: 638.4 ML | Refills: 0 | Status: SHIPPED | OUTPATIENT
Start: 2023-04-25 | End: 2023-05-02

## 2023-04-25 RX ADMIN — OXYCODONE HYDROCHLORIDE 5 MG: 5 SOLUTION ORAL at 08:53

## 2023-04-25 RX ADMIN — BUPROPION HYDROCHLORIDE 150 MG: 150 TABLET, FILM COATED, EXTENDED RELEASE ORAL at 08:49

## 2023-04-25 RX ADMIN — ONDANSETRON 4 MG: 2 INJECTION INTRAMUSCULAR; INTRAVENOUS at 08:48

## 2023-04-25 RX ADMIN — FAMOTIDINE 20 MG: 10 INJECTION INTRAVENOUS at 08:51

## 2023-04-25 RX ADMIN — ACETAMINOPHEN 325MG 975 MG: 325 TABLET ORAL at 03:35

## 2023-04-25 RX ADMIN — METOCLOPRAMIDE 10 MG: 5 INJECTION, SOLUTION INTRAMUSCULAR; INTRAVENOUS at 03:37

## 2023-04-25 RX ADMIN — SIMETHICONE 80 MG: 80 TABLET, CHEWABLE ORAL at 09:42

## 2023-04-25 RX ADMIN — ACETAMINOPHEN 325MG 975 MG: 325 TABLET ORAL at 11:42

## 2023-04-25 NOTE — DISCHARGE INSTRUCTIONS
your medications from [address] in [facility] [address] or cut your pills and open capsules, mix with liquid to drink    Take Tylenol every 8 hours around the clock, unless instructed otherwise  Take your omeprazole daily  It is important to stay hydrated and follow your discharge diet progression   Mild nausea is ok as long as you can drink fluids, sip very slowly and get up and walk during any periods of nausea  You may shower normally after 48 hours, but do not scrub incision sites, blot gently with clean towel to dry incisions  Take home medications as usual unless instructed otherwise while in hospital  Follow up with Dr Felipe López and your PCP within the next week

## 2023-04-25 NOTE — PLAN OF CARE
Problem: PAIN - ADULT  Goal: Verbalizes/displays adequate comfort level or baseline comfort level  Description: Interventions:  - Encourage patient to monitor pain and request assistance  - Assess pain using appropriate pain scale  - Administer analgesics based on type and severity of pain and evaluate response  - Implement non-pharmacological measures as appropriate and evaluate response  - Consider cultural and social influences on pain and pain management  - Notify physician/advanced practitioner if interventions unsuccessful or patient reports new pain  Outcome: Progressing     Problem: INFECTION - ADULT  Goal: Absence or prevention of progression during hospitalization  Description: INTERVENTIONS:  - Assess and monitor for signs and symptoms of infection  - Monitor lab/diagnostic results  - Monitor all insertion sites, i e  indwelling lines, tubes, and drains  - Monitor endotracheal if appropriate and nasal secretions for changes in amount and color  - [place] appropriate cooling/warming therapies per order  - Administer medications as ordered  - Instruct and encourage patient and family to use good hand hygiene technique  - Identify and instruct in appropriate isolation precautions for identified infection/condition  Outcome: Progressing     Problem: DISCHARGE PLANNING  Goal: Discharge to home or other facility with appropriate resources  Description: INTERVENTIONS:  - Identify barriers to discharge w/patient and caregiver  - Arrange for needed discharge resources and transportation as appropriate  - Identify discharge learning needs (meds, wound care, etc )  - Arrange for interpretive services to assist at discharge as needed  - Refer to Case Management Department for coordinating discharge planning if the patient needs post-hospital services based on physician/advanced practitioner order or complex needs related to functional status, cognitive ability, or social support system  Outcome: Progressing Problem: Knowledge Deficit  Goal: Patient/family/caregiver demonstrates understanding of disease process, treatment plan, medications, and discharge instructions  Description: Complete learning assessment and assess knowledge base    Interventions:  - Provide teaching at level of understanding  - Provide teaching via preferred learning methods  Outcome: Progressing

## 2023-04-25 NOTE — DISCHARGE SUMMARY
Discharge Summary - Jordan Alicea 39 y o  female MRN: [id number]    Unit/Bed#: E5 -01 Encounter: [id number]      Pre-Operative Diagnosis: Pre-Op Diagnosis Codes:     * Morbid obesity ([address] ) [E66 01]     * TAYLOR on CPAP [G47 33, Z99 89]     * Hyperlipidemia, unspecified hyperlipidemia type [E78 5]    Post-Operative Diagnosis: Post-Op Diagnosis Codes:     * Morbid obesity ([facility] ) [E66 01]     * TAYLOR on CPAP [G47 33, Z99 89]     * Hyperlipidemia, unspecified hyperlipidemia type [E78 5]    Procedures Performed:  Procedure(s):  BYPASS GASTRIC R-N-Y  W ROBOT    Surgeon: Jesus Manuel Vargas MD    See H & P for full details of admission and Operative Note for full details of operations performed  Patient tolerated surgery well without complications  In the morning postoperative Day 1, the patient had mild nausea and abdominal pain  Tolerated a clear liquid diet without vomiting  Able to ambulate and voiding independently  Patient was deemed ready for discharge home  Patient was seen and examined prior to discharge  Provisions for Follow-Up Care:  See After Visit Summary/Discharge Instructions for information related to follow-up care and home orders  Disposition: Home, in stable condition  Planned Readmission: No    Discharge Medications:  See After Visit Summary/Discharge Instructions for reconciled discharge medications provided to patient and family  Post Operative instructions: Reviewed with patient and/or family      Signature:   Kulwinder Winn PA-C  Date: 4/25/2023 Time: 10:18 AM

## 2023-04-25 NOTE — PROGRESS NOTES
"Progress Note - Bariatric Surgery   Gael Geronimo 39 y o  female MRN: [id number]  Unit/Bed#: E5 -01 Encounter: [id number]      Subjective/Objective     Subjective:  Patient seen and valuated this morning at bedside  Patient is  POD1 s/p Robotic Yesenia-En-Y Gastric Bypass  Patient denies fevers, chills, sweats, SOB, CP, calf pain  Pain adequately controlled on oral pain medication  Ambulating without assistance, voiding well, and using incentive spirometer  Patient tolerating liquid diet without nausea or vomiting today  Vital signs stable    Objective:    /83   Pulse 55   Temp 98 6 °F (37 °C) (Temporal)   Resp 18   Ht 5' 1 5\" (1 562 m)   Wt 129 kg (285 lb 4 4 oz)   LMP 04/03/2023 (Exact Date)   SpO2 97%   BMI 53 03 kg/m²       Intake/Output Summary (Last 24 hours) at 4/25/2023 0740  Last data filed at 4/24/2023 2327  Gross per 24 hour   Intake 2300 ml   Output 400 ml   Net 1900 ml       Invasive Devices     Peripheral Intravenous Line  Duration           Peripheral IV 04/24/23 Right Hand 1 day                ROS: 10-point system completed  All negative except see HPI      Physical Exam    General Appearance:    Alert, cooperative, no distress, appears stated age   Head:    Normocephalic, without obvious abnormality, atraumatic   Lungs:     Respirations unlabored   Heart:    Regular rate and rhythm   Abdomen:     Soft, appropriate tenderness, no masses, no organomegaly, non-distended   Extremities:   Extremities normal, atraumatic, no cyanosis or edema   Neurologic:  Incision:  Psych:   Normal strength and sensation    Clean, dry, and intact    Normal mood and affect       Lab, Imaging and other studies:  CBC:   Lab Results   Component Value Date    WBC 10 31 (H) 04/25/2023    HGB 11 7 04/25/2023    HCT 36 7 04/25/2023    MCV 89 04/25/2023     04/25/2023    MCH 28 3 04/25/2023    MCHC 31 9 04/25/2023    RDW 13 4 04/25/2023    MPV 10 9 04/25/2023   , CMP:   Lab Results   Component Value " Date    SODIUM 138 04/25/2023    K 3 8 04/25/2023     04/25/2023    CO2 23 04/25/2023    BUN 6 04/25/2023    CREATININE 0 62 04/25/2023    CALCIUM 8 5 04/25/2023    EGFR 110 04/25/2023        VTE Mechanical Prophylaxis: sequential compression device    Assessment/Plan  1)  Patient with Morbid Obesity s/p Robotic Yesenia-En-Y Gastric Bypass with stable post op course  Patient afebrile and hemodynamically stable  - Encourage PO fluids   - Recommend ambulation, use of SCDs when not ambulating, and incentive spirometry  - Multimodal pain control  - Complete antibiotic course  - Plan to D/C patient home today pending anticipated progression    Plan of care was discussed with patient    Care plan discussed with Dr Sebas Anderson DO  Bariatric Surgery Fellow  4/25/2023  7:40 AM

## 2023-04-25 NOTE — DISCHARGE INSTR - AVS FIRST PAGE
Bariatric/Weight Loss Surgery  Hospital Discharge Instructions  ACTIVITY:  Progress as feels comfortable - a good rule is:  if you are doing something and it begins to hurt, stop doing the activity  Walk every hour while at home  You may walk stairs if you do so slowly  You may shower 48 hours after surgery  Do not scrub incision sites  Blot gently with clean towel to dry incisions  (see #4 below)   Use your incentive spirometer 10 times per hour while awake for 1 week after surgery  Do NOT drive for 48 hours after surgery  No driving 24 hours after taking certain prescription pain medications  Examples of such medication are Percocet, Darvocet, Oxycodone, Tylenol #3, and Tylenol with Codeine  DIET  Stay on a liquid diet for 7 days after your surgery date, sipping slowly  Refer to your manual for examples of choices  Remember to keep your liquids sugar free or low calorie  You may have protein drinks  Make sure to drink 48 to 64 ounces per day of fluids  You may advance to a pureed diet one week after surgery as instructed by your diet progression pamphlet  Once you get approval from your surgeon at your first post operative visit, you may advance to the soft diet and remain on soft diet for 8 weeks unless otherwise instructed  MEDICATIONS:  The abdominal nerve block will wear off during the first 1-2 days that you are home, and you may become sore (especially over incision site/sites where abdominal wall is sutured)  This may create a pulling sensation, especially while moving around, and will fade over time  Continue to take your Tylenol and your pain medication as instructed  Start vitamins and minerals one week after surgery or when you start stage 3/puree diet  Anti-acid Medication as per prescription  Other medications as indicated on the Physician Patient Discharge Instructions form given to you at the time of discharge    Make sure that you are splitting your pill or tablet medications in halves or fourths or even crushing them before you take them  Capsules should be opened and mixed with water or jello  You need to do this for at least 4 weeks after surgery  Eventually you will be able to take your medications the regular way as they were prescribed  You will need to consult with your Family Doctor in regards to all your prescribed medication, particularly those for blood pressure and diabetes  As you lose weight, medical conditions may change, requiring an alteration or elimination of the drug dose  Monitor blood pressure closely and call PCP with any concerns  Sleeve Gastrectomy patients ONLY:  Complete full course of lovenox injections! DO NOT TAKE BIRTH CONTROL(BC) MEDICATIONS, INSERT BC VAGINAL RINGS, OR PLACE IUD OR ANY OTHER BC METHODS UNTIL 31 DAYS FROM DAY OF DISCHARGE FROM HOSPITAL  THIS PLACES YOU AT HIGH RISK FOR A POTENTIALLY LIFE THREATENING BLOOD CLOT  Remember to always use barrier methods for birth control and speak to your GYN about using two forms of birth control to start 31 days after surgery  It is very important to avoid pregnancy until at least 18-24 months after surgery  INCISION CARE  You may shower and get incisions wet 2 days after surgery  No soaking tub baths or swimming for 30 days after surgery  Keep abdominal area and incisions clean  Use soap and water to create a good lather and rinse off  Do not scrub incisions  If you have a drain, empty the drain as the nurses instructed  FOLLOW-UP APPOINTMENT should be made for one week after discharge  Call surgeon’s office at [phone] to schedule an appointment      CALL YOUR DOCTOR FOR:  pain not controlled by pain medications, a temperature greater than 101 5° F, any increase or change in drainage or redness from any incision, any vomiting or inability to keep liquids down, shortness of breath, shoulder pain, or bleeding

## 2023-04-25 NOTE — UTILIZATION REVIEW
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 rule out GIST    POD#1 Progress Note:   4/25   D/C  home    Admission Orders: Date/Time/Statement:   Admission Orders (From admission, onward)     Ordered        04/24/23 0845  Inpatient Admission  Once                      Orders Placed This Encounter   Procedures   • Inpatient Admission     Standing Status:   Standing     Number of Occurrences:   1     Order Specific Question:   Level of Care     Answer:   Med Surg [16]     Order Specific Question:   Estimated length of stay     Answer:   Inpatient Only Surgery     Vital Signs: /76 (BP Location: Right arm)   Pulse 56   Temp 98 3 °F (36 8 °C) (Oral)   Resp 18   Ht 5' 1 5\" (1 562 m)   Wt 129 kg (285 lb 4 4 oz)   LMP 04/03/2023 (Exact Date)   SpO2 97%   BMI 53 03 kg/m²     Pertinent Labs/Diagnostic Test Results:       Results from last 7 days   Lab Units 04/25/23  0530 04/20/23  0723   WBC Thousand/uL 10 31*  --    WHITE BLOOD CELL COUNT  Thousand/uL  --  5 6   HEMOGLOBIN g/dL 11 7  --    HEMOGLOBIN  g/dL  --  13 2   HEMATOCRIT % 36 7  --    HEMATOCRIT  %  --  40 3   PLATELETS Thousands/uL 221  --    PLATELETS  Thousand/uL  --  258   NEUTROS ABS   cells/uL  --  3,007         Results from last 7 days   Lab Units 04/25/23  0530 04/20/23  0723   SODIUM mmol/L 138 138   POTASSIUM mmol/L 3 8 4 3   CHLORIDE mmol/L 108 104   CO2 mmol/L 23 24   ANION GAP mmol/L 7  --    BUN mg/dL 6 14   CREATININE mg/dL 0 62 0 70   EGFR ml/min/1 73sq m 110 109   CALCIUM mg/dL 8 5 9 4     Results from last 7 days   Lab Units 04/20/23  0723   AST U/L 17   ALT U/L 22   ALK PHOS U/L 53   TOTAL PROTEIN g/dL 6 6   ALBUMIN g/dL 4 4   TOTAL BILIRUBIN mg/dL 0 5     Results from last 7 days   Lab Units 04/24/23  1031 " 04/24/23  0602   POC GLUCOSE mg/dl 163* 107     Results from last 7 days   Lab Units 04/25/23  0530 04/20/23  0723   GLUCOSE RANDOM mg/dL 103 93         Results from last 7 days   Lab Units 04/20/23  0723   HEMOGLOBIN A1C % of total Hgb 5 3   EAG mmol/L 105  5 8               Diet:  Bariatric cl liq    Mobility:   OOB as  tolerated    DVT Prophylaxis:    SCD'S    Medications/Pain Control:   Scheduled Medications:  acetaminophen, 975 mg, Oral, Q8H   Or  acetaminophen, 975 mg, Oral, Q8H  buPROPion, 150 mg, Oral, QAM  famotidine, 20 mg, Intravenous, BID  scopolamine, 1 patch, Transdermal, Once      Continuous IV Infusions:  lactated ringers, 100 mL/hr, Intravenous, Continuous  sodium chloride, 125 mL/hr, Intravenous, Continuous      PRN Meds:  diphenhydrAMINE, 25 mg, Oral, Q8H PRN  metoclopramide, 10 mg, Intravenous, Q6H PRN  morphine injection, 4 mg, Intravenous, Q4H PRN  ondansetron, 4 mg, Intravenous, Q6H PRN  oxyCODONE, 10 mg, Oral, Q4H PRN  oxyCODONE, 5 mg, Oral, Q4H PRN  phenol, 2 spray, Mouth/Throat, Q2H PRN  promethazine, 25 mg, Intravenous, Q6H PRN  simethicone, 80 mg, Oral, 4x Daily PRN        Network Utilization Review Department  ATTENTION: Please call with any questions or concerns to [phone] and carefully listen to the prompts so that you are directed to the right person  All voicemails are confidential   Eladio Powell all requests for admission clinical reviews, approved or denied determinations and any other requests to dedicated fax number below belonging to the campus where the patient is receiving treatment   List of dedicated fax numbers for the Facilities:  [address] DENIALS (Administrative/Medical Necessity) [phone]   [address] (Maternity/NICU/Pediatrics) Radha Kincaid 172 [phone]   [facility] [phone]   [address] [address] Kieran Stark  [phone]   [address] Catie Shanks [place] [address] [facility] [phone]

## 2023-04-26 ENCOUNTER — TELEPHONE (OUTPATIENT)
Dept: MEDSURG UNIT | Facility: HOSPITAL | Age: 43
End: 2023-04-26

## 2023-04-26 NOTE — TELEPHONE ENCOUNTER
Post op follow up phone call completed  Pt is sipping liquids and has consumed about 12 oz sof ar today  Using IS as instructed, reinforced importance of using IS to help prevent pneumonia  Ambulating about home without difficulty  Pain controlled with analgesia, using tylenol and oxycodone  Started miralax, passing gas, had 1 bm  Pt states water was bloody after bowel  Movement  Pt did have poly removed during surgery  Bleeding can be from removing of polyp but should become less  Instructed to call office if bleeding occurs outside of bowel movement or bleeding with bowel movement does not improve  Reaffirmed examples of liquid diet over the next week  Pt stated understanding about discharge instructions and medication adjustments  Follow up appt with surgeon scheduled for next week  Instructed to call with any additional questions or concerns

## 2023-04-26 NOTE — TELEPHONE ENCOUNTER
Return call to pt to inform if another bloody bowel movement she should proceed to ED for evaluation  Pt did offer additional information regarding stool this morning  Pt has always had problems with constipation and had to strain to evacuate the bowel movement this morning  Stool was hard and she stated it was also dark in color  Instructed pt to continue with miralax and to consider adding the lite prune juice or the plum smart lite to assist with keeping stool soft  Pt denies SOB, lightheadedness or rapid heart rate  Pt stated understanding of instructions and will comply

## 2023-04-26 NOTE — UTILIZATION REVIEW
NOTIFICATION OF ADMISSION DISCHARGE   This is a Notification of Discharge from [facility]  Please be advised that this patient has been discharge from our facility  Below you will find the admission and discharge date and time including the patient’s disposition  UTILIZATION REVIEW CONTACT:  Alireza Horse  Utilization   Network Utilization Review Department  Phone: [phone] x carefully listen to the prompts  All voicemails are confidential   Email: [email or web address]     ADMISSION INFORMATION  PRESENTATION DATE: 4/24/2023  5:18 AM  OBERVATION ADMISSION DATE:   INPATIENT ADMISSION DATE: 4/24/23  8:45 AM   DISCHARGE DATE: 4/25/2023 12:39 PM   DISPOSITION:Home/Self Care    IMPORTANT INFORMATION:  Send all requests for admission clinical reviews, approved or denied determinations and any other requests to dedicated fax number below belonging to the campus where the patient is receiving treatment   List of dedicated fax numbers:  [address] DENIALS (Administrative/Medical Necessity) [phone]   [address] (Maternity/NICU/Pediatrics) [phone]   [facility] [phone]   [address] [phone]   [address] [phone]   [address] [facility] [phone] [address] [facility] [phone]   [facility] 119 [phone]   [facility]  [phone]   [address] [facility] [phone]   [address] [facility] [address] [facility] [phone]

## 2023-05-04 ENCOUNTER — OFFICE VISIT (OUTPATIENT)
Dept: BARIATRICS | Facility: CLINIC | Age: 43
End: 2023-05-04

## 2023-05-04 VITALS
DIASTOLIC BLOOD PRESSURE: 78 MMHG | HEIGHT: 62 IN | TEMPERATURE: 97.8 F | HEART RATE: 85 BPM | SYSTOLIC BLOOD PRESSURE: 136 MMHG | WEIGHT: 273.5 LBS | BODY MASS INDEX: 50.33 KG/M2

## 2023-05-04 DIAGNOSIS — Z48.815 ENCOUNTER FOR SURGICAL AFTERCARE FOLLOWING SURGERY OF DIGESTIVE SYSTEM: Primary | ICD-10-CM

## 2023-05-04 DIAGNOSIS — Z98.84 BARIATRIC SURGERY STATUS: Primary | ICD-10-CM

## 2023-05-04 RX ORDER — BUPROPION HYDROCHLORIDE 100 MG/1
100 TABLET ORAL 2 TIMES DAILY
COMMUNITY

## 2023-05-04 NOTE — PROGRESS NOTES
Weight Management Nutrition Note      Bariatric Surgeon: Dr Luz Virk    Surgery: Gastric Bypass Laparoscopic    Class: first post op note    Topics discussed today include:     fluid goals post op, protein goals post op, constipation, chew food well, exercise, diet progression, protein supplems, vitamin/mineral supplements and calcium supplements    Patient was able to verbalize basic diet (protein, fluid, vitamin and mineral) recommendations and possible nutrition-related complications   Yes

## 2023-05-04 NOTE — PROGRESS NOTES
"POST OP UP VISIT - BARIATRIC SURGERY  Zahra Sanabria 39 y o  female MRN: [id number]  Unit/Bed#:  Encounter: [id number]      HPI:  Zahra Sanabria is a 39 y o  female status post Robotic RNYGB performed by Dr Dipak Salcedo on  returning to office for first post op visit since surgery  Tolerating diet  Denies nausea and vomiting  Taking multivitamins and PPI daily  Review of Systems   Constitutional: Negative  Respiratory: Negative  Cardiovascular: Negative  Gastrointestinal: Negative  Historical Information   Past Medical History:   Diagnosis Date    Anxiety     CPAP (continuous positive airway pressure) dependence     Diabetes mellitus (HCC)     GERD (gastroesophageal reflux disease)     Hashimoto's thyroiditis     Hyperlipidemia     Obesity     Sleep apnea      Past Surgical History:   Procedure Laterality Date     SECTION      EGD      CA LAPS GSTR RSTCV PX W/BYP SUSHMA-EN-Y LIMB <150 CM N/A 2023    Procedure: BYPASS GASTRIC R-N-Y  W ROBOT;  Surgeon: Dipak Salcedo MD;  Location: [facility] OR;  Service: Bariatrics    WISDOM TOOTH EXTRACTION       Social History   Social History     Substance and Sexual Activity   Alcohol Use Yes    Comment: rarely     Social History     Substance and Sexual Activity   Drug Use Never     Social History     Tobacco Use   Smoking Status Former    Packs/day: 0 25    Years: 10 00    Pack years: 2 50    Types: Cigarettes    Quit date: 2022    Years since quittin 8   Smokeless Tobacco Never     Family History: non-contributory    Meds/Allergies   PTA meds:   Cannot display prior to admission medications because the patient has not been admitted in this contact       No Known Allergies    Objective       Current Vitals:   Blood Pressure: 136/78 (23 1428)  Pulse: 85 (23 1428)  Temperature: 97 8 °F (36 6 °C) (23 1428)  Temp Source: Tympanic (23 1428)  Height: 5' 1 5\" (156 2 cm) (23 1428)  Weight - " Scale: 124 kg (273 lb 8 oz) (05/04/23 1428)      Invasive Devices     None                 Physical Exam  Constitutional:       General: She is not in acute distress  Cardiovascular:      Rate and Rhythm: Normal rate and regular rhythm  Pulmonary:      Effort: Pulmonary effort is normal    Neurological:      Mental Status: She is alert  Assessment/PLAN:    39 y o  female status post Robotic RNYGB done on 4/24 by Dr Xenia Scott, doing well post op  No major issues and healing well     - Increase physical activity slowly as tolerated and instructed    - Advance diet as instructed by our dietitians today and as indicated in the binder  - Continue PPI  - Follow up in one month as scheduled          Cecy Tucker DO  Bariatric Surgery Fellow  [facility]  5/4/2023  2:40 PM

## 2023-05-17 DIAGNOSIS — E03.8 HYPOTHYROIDISM DUE TO HASHIMOTO'S THYROIDITIS: Primary | ICD-10-CM

## 2023-05-17 DIAGNOSIS — E06.3 HYPOTHYROIDISM DUE TO HASHIMOTO'S THYROIDITIS: Primary | ICD-10-CM

## 2023-05-17 NOTE — PROGRESS NOTES
Pt requested labs for after 30 days out from gastric bypass and it was reviewed by Semaj Santiago  Orders were entered and sent the lab for the patient

## 2023-05-19 LAB
T4 FREE SERPL-MCNC: 1 NG/DL (ref 0.8–1.8)
TSH SERPL-ACNC: 2.02 MIU/L

## 2023-05-24 ENCOUNTER — OFFICE VISIT (OUTPATIENT)
Dept: ENDOCRINOLOGY | Facility: HOSPITAL | Age: 43
End: 2023-05-24

## 2023-05-24 VITALS
SYSTOLIC BLOOD PRESSURE: 124 MMHG | DIASTOLIC BLOOD PRESSURE: 84 MMHG | BODY MASS INDEX: 48.25 KG/M2 | HEIGHT: 62 IN | WEIGHT: 262.2 LBS | HEART RATE: 70 BPM

## 2023-05-24 DIAGNOSIS — E03.8 HYPOTHYROIDISM DUE TO HASHIMOTO'S THYROIDITIS: Primary | ICD-10-CM

## 2023-05-24 DIAGNOSIS — E66.01 CLASS 3 SEVERE OBESITY DUE TO EXCESS CALORIES WITHOUT SERIOUS COMORBIDITY WITH BODY MASS INDEX (BMI) OF 50.0 TO 59.9 IN ADULT (HCC): ICD-10-CM

## 2023-05-24 DIAGNOSIS — R73.03 PRE-DIABETES: ICD-10-CM

## 2023-05-24 DIAGNOSIS — E06.3 HYPOTHYROIDISM DUE TO HASHIMOTO'S THYROIDITIS: Primary | ICD-10-CM

## 2023-05-24 DIAGNOSIS — E01.0 THYROMEGALY: ICD-10-CM

## 2023-05-24 NOTE — PROGRESS NOTES
Patricia Lee 39 y o  female MRN: [id number]    Encounter: [id number]      Assessment/Plan     Assessment: This is a 39y o -year-old female with hypothyroidism due to Hashimoto's thyroiditis, pre diabetes and obesity         Plan:  1  Thyromegaly: Winnie Andrea most recent ultrasound of the thyroid showed a heterogeneous thyroid with no discrete nodules  He is having some dysphagia issues is unclear if this is related to her recent gastric bypass surgery  We will obtain follow-up thyroid ultrasound for further surveillance      2  Hypothyroidism secondary to Hashimoto's thyroiditis:  She complains of some fatigue   Her most recent TSH is normal   For now, continue levothyroxine at current dose    Recheck TSH and free T4 prior to next office visit       3  Weight gain and history of prediabetes: Her hemoglobin A1c was 5  3   Discussed the importance of diet and regular exercise   She is continuing to following up with Mario Lara's weight management at length during the time of the visit  Check hemoglobin A1c and comprehensive metabolic panel prior to next visit  CC:  Hypothyroidism due to Hashimoto's thyroiditis    History of Present Illness     HPI:  44 y  o  female for follow-up of Hashimoto's thyroiditis, pre diabetes and obesity   She reports thyromegaly and thyroid dysfunction was found after pregnancy in the setting of uncontrolled weight gain  She takes levothyroxine 50 mcg daily Monday through Thursday with 2 tablets on Friday, Saturday and Sunday consistently and appropriately  Winnie Andrea most recent TSH from May 18, 2023 was 2 02 with a free T4 of 1 0  Winnie Andrea most recent thyroid ultrasound from June 5, 2018 shows a grossly stable thyroid heterogeneous in nature with no discrete nodules  Overall, she notes she feels okay   She does report fatigue  Macrina Has is concerned about her difficulty with weight loss   She also had diagnosis of prediabetes in 2018   She has recently undergone gastric bypass surgery on April 24,    Her hemoglobin A1c performed on 2023 is 5 3   She denies any polydipsia, polyuria or polyphagia  Breanne Cardona denies family history of thyroid cancer, but does note her mother had an overactive thyroid had her thyroid removed  She denies any anterior neck discomfort, dysphagia or dysphonia      Review of Systems   Constitutional: Positive for fatigue  Negative for chills and fever  HENT: Positive for trouble swallowing (at times recently)  Negative for voice change  Eyes: Negative  Negative for photophobia, pain, discharge, redness, itching and visual disturbance  Respiratory: Negative  Negative for chest tightness and shortness of breath  Cardiovascular: Negative  Negative for chest pain  Gastrointestinal: Negative  Negative for abdominal pain, constipation, diarrhea and vomiting  Endocrine: Negative for cold intolerance, heat intolerance, polydipsia, polyphagia and polyuria  Genitourinary: Negative  Musculoskeletal: Positive for arthralgias (bilateral knees)  Skin: Negative  Allergic/Immunologic: Negative  Neurological: Negative  Negative for dizziness, syncope, light-headedness and headaches  Hematological: Negative  Psychiatric/Behavioral: Negative  All other systems reviewed and are negative        Historical Information   Past Medical History:   Diagnosis Date   • Anxiety    • CPAP (continuous positive airway pressure) dependence    • Diabetes mellitus (HCC)    • GERD (gastroesophageal reflux disease)    • Hashimoto's thyroiditis    • Hyperlipidemia    • Obesity    • Sleep apnea      Past Surgical History:   Procedure Laterality Date   •  SECTION     • EGD     • HI LAPS GSTR RSTCV PX W/BYP SUSHMA-EN-Y LIMB <150 CM N/A 2023    Procedure: BYPASS GASTRIC R-N-Y  W ROBOT;  Surgeon: Rupinder Powell MD;  Location: [facility] OR;  Service: Bariatrics   • WISDOM TOOTH EXTRACTION       Social History   Social History     Substance and Sexual Activity   Alcohol Use Not Currently    Comment: rarely     Social History     Substance and Sexual Activity   Drug Use Never     Social History     Tobacco Use   Smoking Status Former   • Packs/day: 0 25   • Years: 5 00   • Total pack years: 1 25   • Types: Cigarettes   • Start date: 2020   • Quit date: 2022   • Years since quittin 8   • Passive exposure: Past   Smokeless Tobacco Never     Family History:   Family History   Problem Relation Age of Onset   • Hypothyroidism Mother            • Lung cancer Mother    • Hyperlipidemia Father    • Hypertension Father    • Diabetes type II Paternal Grandmother            • Diabetes type II Maternal Aunt    • Diabetes type I Maternal Aunt    • Diabetes type II Maternal Uncle    • Diabetes type II Paternal Uncle    • Diabetes type I Paternal Uncle    • Diabetes type II Paternal Uncle        Meds/Allergies   Current Outpatient Medications   Medication Sig Dispense Refill   • buPROPion (WELLBUTRIN) 100 mg tablet Take 100 mg by mouth 2 (two) times a day     • CALCIUM CITRATE-VITAMIN D3 PO Take by mouth     • Cholecalciferol (Vitamin D3) 50 MCG (2000 UT) TABS Take 2,000 Units by mouth daily (Patient not taking: Reported on 2023)     • levonorgestrel (MIRENA) 20 MCG/24HR IUD by Intrauterine route     • levothyroxine 50 mcg tablet Take 1 tablet daily and take 2 pills Friday, Saturday and   120 tablet 3   • omeprazole (PriLOSEC) 20 mg delayed release capsule Take 1 capsule (20 mg total) by mouth daily Do not start before 2023  30 capsule 3   • ondansetron (ZOFRAN-ODT) 4 mg disintegrating tablet Take 1 tablet (4 mg total) by mouth every 6 (six) hours as needed for nausea or vomiting for up to 10 doses (Patient not taking: Reported on 2023) 10 tablet 0   • oxyCODONE (Roxicodone) 5 immediate release tablet Take 1 tablet (5 mg total) by mouth every 4 (four) hours as needed for moderate pain Max Daily Amount: 30 mg Do not start before 2023  "(Patient not taking: Reported on 5/4/2023) 10 tablet 0   • Pediatric Multivitamins-Iron (Flintstones Complete) 10 MG CHEW Chew (Patient not taking: Reported on 5/4/2023)       No current facility-administered medications for this visit  No Known Allergies    Objective   Vitals: Blood pressure 124/84, pulse 70, height 5' 1 5\" (1 562 m), weight 119 kg (262 lb 3 2 oz), last menstrual period 04/03/2023, not currently breastfeeding  Physical Exam  Vitals reviewed  Constitutional:       Appearance: She is well-developed  She is obese  HENT:      Head: Normocephalic and atraumatic  Eyes:      Conjunctiva/sclera: Conjunctivae normal       Pupils: Pupils are equal, round, and reactive to light  Comments: Wears glasses   Cardiovascular:      Rate and Rhythm: Normal rate and regular rhythm  Heart sounds: Normal heart sounds  Pulmonary:      Effort: Pulmonary effort is normal       Breath sounds: Normal breath sounds  Abdominal:      General: Bowel sounds are normal       Palpations: Abdomen is soft  Musculoskeletal:         General: Normal range of motion  Cervical back: Normal range of motion and neck supple  Skin:     General: Skin is warm and dry  Neurological:      Mental Status: She is alert and oriented to person, place, and time  Psychiatric:         Behavior: Behavior normal          Thought Content: Thought content normal          Judgment: Judgment normal        Lab Results:   Lab Results   Component Value Date/Time    Free t4 1 0 05/18/2023 12:23 PM    Free t4 1 1 04/20/2023 07:23 AM    Free t4 1 1 10/26/2022 07:24 AM       Imaging Studies:   Results for orders placed in visit on 06/05/18     thyroid    Portions of the record may have been created with voice recognition software  Occasional wrong word or \"sound a like\" substitutions may have occurred due to the inherent limitations of voice recognition software   Read the chart carefully and recognize, using context, where " substitutions have occurred

## 2023-05-24 NOTE — PATIENT INSTRUCTIONS
Be mindful of diet  Exercise regularly  Stay hydrated  Continue levothyroxine 50 mcg daily Monday through Thursday with 2 tablets on Friday, Saturday and Sunday  Obtain thyroid function lab work in 8 weeks and prior to next office visit  Continue to follow up with weight management  Obtain thyroid ultrasound when able

## 2023-06-06 ENCOUNTER — CLINICAL SUPPORT (OUTPATIENT)
Dept: BARIATRICS | Facility: CLINIC | Age: 43
End: 2023-06-06

## 2023-06-06 DIAGNOSIS — E66.01 CLASS 3 SEVERE OBESITY DUE TO EXCESS CALORIES WITHOUT SERIOUS COMORBIDITY WITH BODY MASS INDEX (BMI) OF 50.0 TO 59.9 IN ADULT (HCC): Primary | ICD-10-CM

## 2023-06-06 DIAGNOSIS — Z98.84 BARIATRIC SURGERY STATUS: ICD-10-CM

## 2023-06-06 PROCEDURE — RECHECK: Performed by: DIETITIAN, REGISTERED

## 2023-06-06 NOTE — PROGRESS NOTES
Weight Management Nutrition Class     Diagnosis: Morbid Obesity    Bariatric Surgeon: Dr Deonna Muller    Surgery: Gastric Bypass Laparoscopic    Class: 5 week post op     Topics discussed today include:     fluid goals post op, protein goals post op, constipation, chew food well, exercise, avoidance of alcohol, PPI use, diet progression, hypoglycemia, dumping syndrome, protein supplems, vitamin/mineral supplements, calcium supplements and iron supplements    Patient was able to verbalize basic diet (protein, fluid, vitamin and mineral) recommendations and possible nutrition-related complications   Yes

## 2023-06-20 ENCOUNTER — OFFICE VISIT (OUTPATIENT)
Dept: SLEEP CENTER | Facility: HOSPITAL | Age: 43
End: 2023-06-20
Payer: COMMERCIAL

## 2023-06-20 VITALS
DIASTOLIC BLOOD PRESSURE: 72 MMHG | OXYGEN SATURATION: 97 % | HEIGHT: 61 IN | BODY MASS INDEX: 47.01 KG/M2 | HEART RATE: 73 BPM | SYSTOLIC BLOOD PRESSURE: 118 MMHG | WEIGHT: 249 LBS

## 2023-06-20 DIAGNOSIS — G47.33 OSA (OBSTRUCTIVE SLEEP APNEA): Primary | ICD-10-CM

## 2023-06-20 DIAGNOSIS — E66.01 MORBID OBESITY (HCC): ICD-10-CM

## 2023-06-20 DIAGNOSIS — R51.9 HEADACHE UPON AWAKENING: ICD-10-CM

## 2023-06-20 DIAGNOSIS — F41.9 ANXIETY AND DEPRESSION: ICD-10-CM

## 2023-06-20 DIAGNOSIS — R40.0 DAYTIME SLEEPINESS: ICD-10-CM

## 2023-06-20 DIAGNOSIS — G47.34 SLEEP RELATED HYPOXIA: ICD-10-CM

## 2023-06-20 DIAGNOSIS — F32.A ANXIETY AND DEPRESSION: ICD-10-CM

## 2023-06-20 PROCEDURE — 99214 OFFICE O/P EST MOD 30 MIN: CPT | Performed by: INTERNAL MEDICINE

## 2023-06-20 NOTE — PATIENT INSTRUCTIONS

## 2023-06-20 NOTE — PROGRESS NOTES
Follow-Up Note - Sleep Center   Ritchie Diaz  39 y o  female  :1980  [facility]:[id number]  DOS:2023    CC: I saw this patient for follow-up in clinic today for Sleep disordered breathing, Coexisting Sleep and Medical Problems  Interval changes: Treatment was initiated using a ResMed machine  Patient had home sleep study was undertaken to evaluate for sleep disordered breathing, followed by a subsequent titration study  The patient is here to review results and to initiate therapy / discuss further options  The study demonstrated: PRISCA (respiratory event index of) 23 7  The lowest SpO2 recorded is 73% and 14 9% of the study was spent with saturations below 90%  The snore index was 0%      During the subsequent therapeutic study, sleep disordered breathing was sufficiently remediated with PAP at 14 H2O  Since she was not observed during REM, auto titrating PAP 14 -17 cm H2O was recommended     PFS, Problem List, Medications & Allergies were reviewed in EMR  She  has a past medical history of Anxiety, CPAP (continuous positive airway pressure) dependence, Diabetes mellitus ([address] ), GERD (gastroesophageal reflux disease), Hashimoto's thyroiditis, Hyperlipidemia, Obesity, and Sleep apnea  She has a current medication list which includes the following prescription(s): bupropion, calcium citrate-vitamin d, levonorgestrel, levothyroxine, multiple vitamins-minerals, omeprazole, vitamin d3, ondansetron, oxycodone, and flintstones complete  PHYSIOLOGICAL DATA REVIEW : Using PAP > 4 hours/night 100%  Estimated PRISCA 0 6/hour with pressure of 14 4cm swiftQueue@[facility]+ percentile; patient has not been using non FDA approved devices to sanitize the machine  INTERPRETATION: Compliance is excellent; Pressure setting is:optimal; ;   SUBJECTIVE: With respect to use of PAP, Guido Zafar  is experiencing some adverse effects:dry mouth/throat and mask causes redness / facial marks   She derives benefit    Is satisfied with "sleep and daytime function  Sleep Routine: Mary Melendez reports getting 7 hrs sleep; she has no difficulty initiating or maintaining sleep   She arises spontaneously and feels more refreshed since on Rx  Mary Melendez reports significantly improved excessive daytime sleepiness,  She rated [herself] at Total score: 0 /24 on the [place] Sleepiness Scale  Other issues: She no longer awakens with headache        Habits:[ reports that she quit smoking about 10 months ago  Her smoking use included cigarettes  She started smoking about 3 years ago  She has a 1 25 pack-year smoking history  She has been exposed to tobacco smoke  She has never used smokeless tobacco ], [ reports that she does not currently use alcohol ], [ reports no history of drug use ], Caffeine use:none; Exercise routine: regular  ROS: Significant for approximately 70 pounds weight loss since her initial study and bariatric surgery in April of this year  She is reporting no nasal, respiratory or cardiac symptoms  She feels mood is stable on current medication  EXAM: /72 (BP Location: Left arm, Patient Position: Sitting, Cuff Size: Standard)   Pulse 73   Ht 5' 1\" (1 549 m)   Wt 113 kg (249 lb)   SpO2 97%   BMI 47 05 kg/m²     Wt Readings from Last 3 Encounters:   06/20/23 113 kg (249 lb)   05/24/23 119 kg (262 lb 3 2 oz)   05/04/23 124 kg (273 lb 8 oz)      Patient is well groomed; well appearing  CNS: Alert, orientated, clear & coherent speech  Psych: cooperative and in no distress  Mental state: Appears normal   H&N: EOMI; NC/AT: No facial pressure marks, no rashes  Skin/Extrem: col & hydration normal; no edema  Resp: Respiratory effort is normal  Physical findings otherwise essentially unchanged from previous  IMPRESSION: Problem List Items & Comorbidities Addressed this Visit    1  TAYLOR (obstructive sleep apnea)  PAP DME Pressure Change    PAP DME Resupply/Reorder      2  Sleep related hypoxia        3   Headache upon awakening      " "  4  Daytime sleepiness        5  Anxiety and depression        6  Morbid obesity ([address] )        1-5 improved    PLAN:  1  I reviewed results of prior studies and physiologic data with the patient  2  I discussed treatment options with risks and benefits  3  Treatment with  PAP is medically necessary and Jacquelin Holden is agreable to continue use  4  Care of equipment, methods to improve comfort using PAP and importance of compliance with therapy were discussed  5  Pressure setting: adjust 12-15 cmH2O in view of the significant weight reduction  6  Rx provided to replace supplies and Care coordinated with DME provider  7  Discussed strategies for weight reduction  8  Follow-up is advised in 6 months or sooner if needed to monitor progress, compliance and to adjust therapy  Thank you for allowing me to participate in the care of this patient  Sincerely,     Authenticated electronically on 66/67/10   Board Certified Specialist     Portions of the record may have been created with voice recognition software  Occasional wrong word or \"sound a like\" substitutions may have occurred due to the inherent limitations of voice recognition software  There may also be notations and random deletions of words or characters from malfunctioning software  Read the chart carefully and recognize, using context, where substitutions/deletions have occurred      "

## 2023-06-21 ENCOUNTER — TELEPHONE (OUTPATIENT)
Dept: SLEEP CENTER | Facility: CLINIC | Age: 43
End: 2023-06-21

## 2023-06-22 LAB

## 2023-07-01 LAB
T4 FREE SERPL-MCNC: 1.2 NG/DL (ref 0.8–1.8)
TSH SERPL-ACNC: 0.86 MIU/L

## 2023-07-20 ENCOUNTER — TELEPHONE (OUTPATIENT)
Dept: BARIATRICS | Facility: CLINIC | Age: 43
End: 2023-07-20

## 2023-07-20 NOTE — TELEPHONE ENCOUNTER
Called and left V/M message asking if patient would be willing to participate in research study  Name and contact information left.

## 2023-08-04 ENCOUNTER — OFFICE VISIT (OUTPATIENT)
Dept: BARIATRICS | Facility: CLINIC | Age: 43
End: 2023-08-04
Payer: COMMERCIAL

## 2023-08-04 VITALS
BODY MASS INDEX: 42.78 KG/M2 | TEMPERATURE: 98 F | SYSTOLIC BLOOD PRESSURE: 130 MMHG | WEIGHT: 232.5 LBS | DIASTOLIC BLOOD PRESSURE: 76 MMHG | HEART RATE: 75 BPM | HEIGHT: 62 IN

## 2023-08-04 DIAGNOSIS — Z48.815 ENCOUNTER FOR SURGICAL AFTERCARE FOLLOWING SURGERY OF DIGESTIVE SYSTEM: ICD-10-CM

## 2023-08-04 DIAGNOSIS — K91.2 POSTSURGICAL MALABSORPTION: Primary | ICD-10-CM

## 2023-08-04 DIAGNOSIS — E66.01 OBESITY, CLASS III, BMI 40-49.9 (MORBID OBESITY) (HCC): ICD-10-CM

## 2023-08-04 DIAGNOSIS — Z98.84 BARIATRIC SURGERY STATUS: ICD-10-CM

## 2023-08-04 PROCEDURE — 99213 OFFICE O/P EST LOW 20 MIN: CPT | Performed by: SURGERY

## 2023-08-04 NOTE — PROGRESS NOTES
POST-OP OFFICE VISIT - BARIATRIC SURGERY  Larissa Kendall 39 y.o. female MRN: [id number]  Unit/Bed#:  Encounter: [id number]      HPI:  Larissa Kendall is a 39 y.o. female status post Robotic RNYGB by Dr. Taina Wells on 23 presents to office for 3-month post-op visit. Subjective     Patient presents to the office for post-op visit. Tolerating regular diet: Yes    Nausea/Vomiting/Constipation/Diarrhea: Intermittent nausea, denies on daily basis  Eating at least 60 g of protein: Yes  Following 30/60 minute role with liquids: Yes  Drinking at least 64 oz of fluid: Gets about 40oz per day  Taking vitamin/mineral supplements:Yes  Taking omeprazole: No  Reviewed and advised patient on vitamins and supplements. Exercising: Yes, exercise encouraged. Current weight:  232lb  BMI:  43.22  Weight lost since surgery: 47%   Excess body weight loss thus far since surgery discussed with patient. Review of Systems   Constitutional: Negative for chills and fever. HENT: Negative for ear pain and sore throat. Eyes: Negative for pain and visual disturbance. Respiratory: Negative for cough and shortness of breath. Cardiovascular: Negative for chest pain and palpitations. Gastrointestinal: Negative for abdominal pain and vomiting. Genitourinary: Negative for dysuria and hematuria. Musculoskeletal: Negative for arthralgias and back pain. Skin: Negative for color change and rash. Neurological: Negative for seizures and syncope. All other systems reviewed and are negative.       Historical Information   Past Medical History:   Diagnosis Date   • Anxiety    • CPAP (continuous positive airway pressure) dependence    • Diabetes mellitus (HCC)    • GERD (gastroesophageal reflux disease)    • Hashimoto's thyroiditis    • Hyperlipidemia    • Obesity    • Sleep apnea      Past Surgical History:   Procedure Laterality Date   •  SECTION     • EGD     • [place] LAPS GSTR RSTCV PX W/BYP SUSHMA-EN-Y LIMB <150 CM N/A 2023    Procedure: BYPASS GASTRIC R-N-Y  Binu Amy;  Surgeon: David Clark MD;  Location: [facility];  Service: Bariatrics   • WISDOM TOOTH EXTRACTION       Social History   Social History     Substance and Sexual Activity   Alcohol Use Not Currently    Comment: rarely     Social History     Substance and Sexual Activity   Drug Use Never     Social History     Tobacco Use   Smoking Status Former   • Packs/day: 0.25   • Years: 5.00   • Total pack years: 1.25   • Types: Cigarettes   • Start date: 2020   • Quit date: 2022   • Years since quittin.0   • Passive exposure: Past   Smokeless Tobacco Never       Objective       Current Vitals:   Blood Pressure: 130/76 (23 1501)  Pulse: 75 (23 1501)  Temperature: 98 °F (36.7 °C) (23 1501)  Temp Source: Tympanic (23 1501)  Height: 5' 1.5" (156.2 cm) (23 1501)  Weight - Scale: 105 kg (232 lb 8 oz) (23 1501)    Invasive Devices     None                 Physical Exam  Constitutional:       Appearance: Normal appearance. She is obese. HENT:      Head: Normocephalic and atraumatic. Nose: Nose normal.      Mouth/Throat:      Mouth: Mucous membranes are moist.   Eyes:      Extraocular Movements: Extraocular movements intact. Pupils: Pupils are equal, round, and reactive to light. Cardiovascular:      Rate and Rhythm: Normal rate and regular rhythm. Pulses: Normal pulses. Heart sounds: Normal heart sounds. Pulmonary:      Effort: Pulmonary effort is normal.      Breath sounds: Normal breath sounds. Abdominal:      Palpations: Abdomen is soft. Comments: Well-healed incisions   Musculoskeletal:      Cervical back: Normal range of motion. Skin:     General: Skin is warm. Neurological:      General: No focal deficit present. Mental Status: She is alert and oriented to person, place, and time.    Psychiatric:         Mood and Affect: Mood normal.         Behavior: Behavior normal. Assessment/PLAN:    Davide Dixon is a 39 y.o. female status post Robotic RNYGB by Dr. Sandip Hutchinson on 4/24/23 presents to office for 3-month post-op visit. Follow diet as discussed. Labs ordered for next visit. Get lab work done prior to next appointment in 3 months. It is recommended to check with your insurance BEFORE getting labs done to make sure they are covered by your policy. Make sure to HOLD any multivitamins that may contain biotin and any biotin supplements FOR 5 DAYS before any labs since it can affect the results. IMPORTANT: if you have a [facility]" account, you will receive a letter of your vitamin/mineral results through the computer. Please watch for an update to your chart since recommendations for supplement adjustments will be sent to you this way. Follow vitamin and mineral recommendations as reviewed with you. Bariatric vitamins are highly recommended. Vitamins are important for a life-time to avoid low levels which can lead to other medical problems. Exercise as tolerated. Continue PPI treatment for 2 more months  Call the office if you have any problems with abdominal pain especially associated with fever, chills, nausea, vomiting or any other concerns. All Post-bariatric surgery patients should be aware that very small quantities of any alcohol can cause impairment and it is very possible not to feel the effect. The effect can be in the system for several hours and it is also a stomach irritant. It is advised to AVOID alcohol, Nonsteroidal anti-inflammatory drugs (NSAIDS) and nicotine of all forms. Any of these can cause stomach irritation/pain. Most, if not all, post-gastric surgery patients would benefit from having a regular counselor for at least 2 years post-op to help with need for multiple life-style changes.  If you are interested in this and need help finding a regular counselor, you can also make a follow-up with our  to help you find one. Follow-up in 3 MONTHS. We kindly ask that you arrive 15 minutes before appointment time to allow for our staff to room you and check your vital signs and update your chart. We thank you for your patience at your visit. Miri Chiu PA-C  Bariatric Surgery  8/4/2023  3:07 PM

## 2023-10-28 LAB
EST. AVERAGE GLUCOSE BLD GHB EST-MCNC: 97 MG/DL
EST. AVERAGE GLUCOSE BLD GHB EST-SCNC: 5.4 MMOL/L
HBA1C MFR BLD: 5 % OF TOTAL HGB
T4 FREE SERPL-MCNC: 1.1 NG/DL (ref 0.8–1.8)
TSH SERPL-ACNC: 1.86 MIU/L

## 2023-11-02 LAB
25(OH)D3 SERPL-MCNC: 59 NG/ML (ref 30–100)
ALBUMIN SERPL-MCNC: 4.2 G/DL (ref 3.6–5.1)
ALBUMIN/GLOB SERPL: 1.8 (CALC) (ref 1–2.5)
ALP SERPL-CCNC: 74 U/L (ref 31–125)
ALT SERPL-CCNC: 12 U/L (ref 6–29)
AST SERPL-CCNC: 15 U/L (ref 10–30)
BASOPHILS # BLD AUTO: 58 CELLS/UL (ref 0–200)
BASOPHILS NFR BLD AUTO: 1 %
BILIRUB SERPL-MCNC: 0.5 MG/DL (ref 0.2–1.2)
BUN SERPL-MCNC: 10 MG/DL (ref 7–25)
BUN/CREAT SERPL: NORMAL (CALC) (ref 6–22)
CALCIUM SERPL-MCNC: 9.3 MG/DL (ref 8.6–10.2)
CALCIUM SERPL-MCNC: 9.3 MG/DL (ref 8.6–10.2)
CHLORIDE SERPL-SCNC: 106 MMOL/L (ref 98–110)
CHOLEST SERPL-MCNC: 153 MG/DL
CHOLEST/HDLC SERPL: 3.2 (CALC)
CO2 SERPL-SCNC: 25 MMOL/L (ref 20–32)
CREAT SERPL-MCNC: 0.68 MG/DL (ref 0.5–0.99)
EOSINOPHIL # BLD AUTO: 180 CELLS/UL (ref 15–500)
EOSINOPHIL NFR BLD AUTO: 3.1 %
ERYTHROCYTE [DISTWIDTH] IN BLOOD BY AUTOMATED COUNT: 12.2 % (ref 11–15)
FERRITIN SERPL-MCNC: 68 NG/ML (ref 16–232)
FOLATE SERPL-MCNC: >24 NG/ML
GFR/BSA.PRED SERPLBLD CYS-BASED-ARV: 110 ML/MIN/1.73M2
GLOBULIN SER CALC-MCNC: 2.3 G/DL (CALC) (ref 1.9–3.7)
GLUCOSE SERPL-MCNC: 85 MG/DL (ref 65–99)
HCT VFR BLD AUTO: 40.4 % (ref 35–45)
HDLC SERPL-MCNC: 48 MG/DL
HGB BLD-MCNC: 13 G/DL (ref 11.7–15.5)
IRON SERPL-MCNC: 71 MCG/DL (ref 40–190)
LDLC SERPL CALC-MCNC: 88 MG/DL (CALC)
LYMPHOCYTES # BLD AUTO: 1601 CELLS/UL (ref 850–3900)
LYMPHOCYTES NFR BLD AUTO: 27.6 %
MCH RBC QN AUTO: 29.3 PG (ref 27–33)
MCHC RBC AUTO-ENTMCNC: 32.2 G/DL (ref 32–36)
MCV RBC AUTO: 91.2 FL (ref 80–100)
MONOCYTES # BLD AUTO: 400 CELLS/UL (ref 200–950)
MONOCYTES NFR BLD AUTO: 6.9 %
NEUTROPHILS # BLD AUTO: 3561 CELLS/UL (ref 1500–7800)
NEUTROPHILS NFR BLD AUTO: 61.4 %
NONHDLC SERPL-MCNC: 105 MG/DL (CALC)
PLATELET # BLD AUTO: 222 THOUSAND/UL (ref 140–400)
PMV BLD REES-ECKER: 11.3 FL (ref 7.5–12.5)
POTASSIUM SERPL-SCNC: 3.7 MMOL/L (ref 3.5–5.3)
PROT SERPL-MCNC: 6.5 G/DL (ref 6.1–8.1)
PTH-INTACT SERPL-MCNC: 32 PG/ML (ref 16–77)
RBC # BLD AUTO: 4.43 MILLION/UL (ref 3.8–5.1)
SODIUM SERPL-SCNC: 140 MMOL/L (ref 135–146)
TRIGL SERPL-MCNC: 81 MG/DL
VIT A SERPL-MCNC: 39 MCG/DL (ref 38–98)
VIT B1 BLD-SCNC: 197 NMOL/L (ref 78–185)
VIT B12 SERPL-MCNC: 737 PG/ML (ref 200–1100)
WBC # BLD AUTO: 5.8 THOUSAND/UL (ref 3.8–10.8)

## 2023-11-06 ENCOUNTER — OFFICE VISIT (OUTPATIENT)
Dept: BARIATRICS | Facility: CLINIC | Age: 43
End: 2023-11-06
Payer: COMMERCIAL

## 2023-11-06 VITALS
TEMPERATURE: 98.2 F | BODY MASS INDEX: 37.17 KG/M2 | DIASTOLIC BLOOD PRESSURE: 78 MMHG | HEART RATE: 83 BPM | SYSTOLIC BLOOD PRESSURE: 126 MMHG | WEIGHT: 202 LBS | HEIGHT: 62 IN

## 2023-11-06 DIAGNOSIS — Z48.815 ENCOUNTER FOR SURGICAL AFTERCARE FOLLOWING SURGERY OF DIGESTIVE SYSTEM: Primary | ICD-10-CM

## 2023-11-06 DIAGNOSIS — K91.2 POSTSURGICAL MALABSORPTION: ICD-10-CM

## 2023-11-06 DIAGNOSIS — Z98.84 BARIATRIC SURGERY STATUS: ICD-10-CM

## 2023-11-06 DIAGNOSIS — E66.9 OBESITY, CLASS II, BMI 35-39.9: ICD-10-CM

## 2023-11-06 DIAGNOSIS — E03.8 HYPOTHYROIDISM DUE TO HASHIMOTO'S THYROIDITIS: ICD-10-CM

## 2023-11-06 DIAGNOSIS — E06.3 HYPOTHYROIDISM DUE TO HASHIMOTO'S THYROIDITIS: ICD-10-CM

## 2023-11-06 PROCEDURE — 99213 OFFICE O/P EST LOW 20 MIN: CPT | Performed by: PHYSICIAN ASSISTANT

## 2023-11-06 NOTE — PATIENT INSTRUCTIONS
Follow-up in 6 months. We kindly ask that your arrive 15 minutes before your scheduled appointment time with your provider to allow our staff to room you, get your vital signs and update your chart. Get lab work done. Please call the office if you need a script. It is recommended to check with your insurance BEFORE getting labs done to make sure they are covered by your policy. Call our office if you have any problems with abdominal pain especially associated with fever, chills, nausea, vomiting or any other concerns. All  Post-bariatric surgery patients should be aware that very small quantities of any alcohol can cause impairment and it is very possible not to feel the effect. The effect can be in the system for several hours. It is also a stomach irritant. It is advised to AVOID alcohol, Nonsteroidal antiinflammatory drugs (NSAIDS) and nicotine of all forms . Any of these can cause stomach irritation/pain. Discussed the effects of alcohol on a bariatric patient and the increased impairment risk. Keep up the good work!

## 2023-11-06 NOTE — PROGRESS NOTES
Assessment/Plan:     Patient ID: Rigo Stark is a 39 y.o. female. Bariatric Surgery Status    -s/p Yesenia-En-Y Gastric Bypass with Dr. Lyubov Alarcon on 4/24/2023. Presents to the office today for 3rd postop doing well. Continued/Maintain healthy weight loss with good nutrition intakes. Adequate hydration with at least 64oz. fluid intake. Follow diet as discussed. Follow vitamin and mineral recommendations as reviewed with you. Exercise as tolerated. Colonoscopy referral made: n/a    Follow-up in 6 months. We kindly ask that your arrive 15 minutes before your scheduled appointment time with your provider to allow our staff to room you, get your vital signs and update your chart. Get lab work done. Please call the office if you need a script. It is recommended to check with your insurance BEFORE getting labs done to make sure they are covered by your policy. Call our office if you have any problems with abdominal pain especially associated with fever, chills, nausea, vomiting or any other concerns. All  Post-bariatric surgery patients should be aware that very small quantities of any alcohol can cause impairment and it is very possible not to feel the effect. The effect can be in the system for several hours. It is also a stomach irritant. It is advised to AVOID alcohol, Nonsteroidal antiinflammatory drugs (NSAIDS) and nicotine of all forms . Any of these can cause stomach irritation/pain. Discussed the effects of alcohol on a bariatric patient and the increased impairment risk. Keep up the good work!      Postsurgical Malabsorption   -At risk for malabsorption of vitamins/minerals secondary to malabsorption and restriction of intake from bariatric surgery  -NOT Currently taking adequate postop bariatric surgery vitamin supplementation  -Last set of bariatric labs completed 10/2023 and overall wnl  -Patient received education about the importance of adhering to a lifelong supplementation regimen to avoid vitamin/mineral deficiencies      Diagnoses and all orders for this visit:    Encounter for surgical aftercare following surgery of digestive system    Bariatric surgery status    Postsurgical malabsorption    Obesity, Class II, BMI 35-39.9    Hypothyroidism due to Hashimoto's thyroiditis    Other orders  -     Multiple Vitamins-Minerals (HAIR SKIN NAILS PO); Take by mouth 2 (two) times a day         Subjective:      Patient ID: Tahira Hamilton is a 39 y.o. female. -s/p Yesenia-En-Y Gastric Bypass with Dr. Gibson Mayberry on 4/24/2023. Presents to the office today for 3rd postop doing well. Tolerating diet without issues; denies N/V, dysphagia, reflex    Initial: 317  Current:  202  EWL: (Weight loss is ahead of schedule at this post surgical period.)  Jerrell: current   Current BMI is Body mass index is 37.55 kg/m². Tolerating a regular diet-yes  Eating at least 60 grams of protein per day-yes  Following 30/60 minute rule with liquids-yes  Drinking at least 64 ounces of fluid per day-yes  Sufficient exercise-yes  Using NSAIDs regularly-no  Using nicotine-no  Using alcohol-no  Supplements:  willam mvi + calcium citrate + hair/skin/nails     EWL is 63%, which places the patient ahead of schedule for expected post surgical weight loss at this time. The following portions of the patient's history were reviewed and updated as appropriate: allergies, current medications, past family history, past medical history, past social history, past surgical history and problem list.    Review of Systems   Constitutional: Negative. Respiratory: Negative. Cardiovascular: Negative. Gastrointestinal: Negative. Neurological: Negative. Psychiatric/Behavioral: Negative. Objective:    /78   Pulse 83   Temp 98.2 °F (36.8 °C) (Tympanic)   Ht 5' 1.5" (1.562 m)   Wt 91.6 kg (202 lb)   BMI 37.55 kg/m²      Physical Exam  Vitals and nursing note reviewed.    Constitutional: Appearance: Normal appearance. She is obese. HENT:      Head: Normocephalic and atraumatic. Eyes:      Extraocular Movements: Extraocular movements intact. Pupils: Pupils are equal, round, and reactive to light. Cardiovascular:      Rate and Rhythm: Normal rate and regular rhythm. Pulmonary:      Effort: Pulmonary effort is normal.      Breath sounds: Normal breath sounds. Abdominal:      General: Bowel sounds are normal.      Tenderness: There is no abdominal tenderness. Musculoskeletal:         General: Normal range of motion. Cervical back: Normal range of motion. Skin:     General: Skin is warm and dry. Neurological:      General: No focal deficit present. Mental Status: She is alert and oriented to person, place, and time.    Psychiatric:         Mood and Affect: Mood normal.

## 2023-11-29 ENCOUNTER — OFFICE VISIT (OUTPATIENT)
Dept: ENDOCRINOLOGY | Facility: HOSPITAL | Age: 43
End: 2023-11-29
Payer: COMMERCIAL

## 2023-11-29 VITALS
SYSTOLIC BLOOD PRESSURE: 118 MMHG | BODY MASS INDEX: 36.25 KG/M2 | HEART RATE: 80 BPM | HEIGHT: 62 IN | WEIGHT: 197 LBS | OXYGEN SATURATION: 99 % | DIASTOLIC BLOOD PRESSURE: 80 MMHG

## 2023-11-29 DIAGNOSIS — E03.8 HYPOTHYROIDISM DUE TO HASHIMOTO'S THYROIDITIS: ICD-10-CM

## 2023-11-29 DIAGNOSIS — E78.5 HYPERLIPIDEMIA, UNSPECIFIED HYPERLIPIDEMIA TYPE: ICD-10-CM

## 2023-11-29 DIAGNOSIS — R73.03 PREDIABETES: ICD-10-CM

## 2023-11-29 DIAGNOSIS — E06.3 HYPOTHYROIDISM DUE TO HASHIMOTO'S THYROIDITIS: ICD-10-CM

## 2023-11-29 DIAGNOSIS — E66.01 CLASS 3 SEVERE OBESITY DUE TO EXCESS CALORIES WITHOUT SERIOUS COMORBIDITY WITH BODY MASS INDEX (BMI) OF 50.0 TO 59.9 IN ADULT (HCC): Primary | ICD-10-CM

## 2023-11-29 PROCEDURE — 99215 OFFICE O/P EST HI 40 MIN: CPT | Performed by: STUDENT IN AN ORGANIZED HEALTH CARE EDUCATION/TRAINING PROGRAM

## 2023-11-29 RX ORDER — LEVOTHYROXINE SODIUM 0.05 MG/1
TABLET ORAL
Qty: 200 TABLET | Refills: 1 | Status: SHIPPED | OUTPATIENT
Start: 2023-11-29

## 2023-11-29 NOTE — PROGRESS NOTES
Established Patient Progress Note       Chief Complaint   Patient presents with    Hypothyroidism    Obesity      History of Present Illness:     Vale Cano is a 39 y.o. female with a history of prediabetes- now resolved, TAYLOR, class 2 obesity s/p daina- en Y bypass in April 2023, hypohyroidism for 9 years on levothyroxine 50mcg mon-thu, 2 tab on fri-sun who presents today for follow up. Previously seen by Jessica Smith, this is a transfer of care visit, all previous notes/labs reviewed for the visit. Hypothyroidism- patient currently on levothyroxine 50mcg m-thur and 2 tab on fri-sun, most recent TFT 10/23 TSH 1.86, free t4 1.1. she reports feeling tired at baseline but not new or worse, denies any constipation, is losing some hair after surgery but not a lot. Is taking biotin supplement for this. Does have dry skin in winter. Prediabetes- was prediabetic before surgery, not on any medications. Most recent HbA1C 5%. Denies any polyuria, polydipsia. Thyromegaly- reported some dysphagia in past possible d/t GERD after surgery but reports this has since resolved. Had [address] thyroid 05/23 which showed heterogenous gland without nodules. Denies any dysphagia, odynophagia. Obesity- s.p gastric bypass surgery April 2023, was 280lbs pre surgery and now 197lbs. Is taking her bariatric MV and also calcium cit with vitD. Reports drinking her protein shake and eating 2 meals.  Follows with bariatric dept      Patient Active Problem List   Diagnosis    Anxiety    Hyperlipidemia    Hypothyroidism    Prediabetes    Class 3 severe obesity due to excess calories without serious comorbidity with body mass index (BMI) of 50.0 to 59.9 in adult [facility])    Risk factors for obstructive sleep apnea    TAYLOR (obstructive sleep apnea)      Past Medical History:   Diagnosis Date    Anxiety     CPAP (continuous positive airway pressure) dependence     Diabetes mellitus (HCC)     GERD (gastroesophageal reflux disease) Hashimoto's thyroiditis     Hyperlipidemia     Obesity     Sleep apnea       Past Surgical History:   Procedure Laterality Date     SECTION      EGD      [place] LAPS GSTR RSTCV PX W/BYP SUSHMA-EN-Y LIMB <150 CM N/A 2023    Procedure: BYPASS GASTRIC R-N-Y  W ROBOT;  Surgeon: Juliane Salmon MD;  Location: [facility] OR;  Service: Bariatrics    WISDOM TOOTH EXTRACTION        Family History   Problem Relation Age of Onset    Hypothyroidism Mother             Lung cancer Mother     Hyperlipidemia Father     Hypertension Father     Diabetes type II Paternal Grandmother             Diabetes type II Maternal Aunt     Diabetes type I Maternal Aunt     Diabetes type II Maternal Uncle     Diabetes type II Paternal Uncle     Diabetes type I Paternal Uncle     Diabetes type II Paternal Uncle      Social History     Tobacco Use    Smoking status: Former     Packs/day: 0.25     Years: 5.00     Total pack years: 1.25     Types: Cigarettes     Start date: 2020     Quit date: 2022     Years since quittin.3     Passive exposure: Past    Smokeless tobacco: Never   Substance Use Topics    Alcohol use: Not Currently     Comment: rarely     No Known Allergies    Current Outpatient Medications:     buPROPion (WELLBUTRIN) 100 mg tablet, Take 100 mg by mouth 2 (two) times a day, Disp: , Rfl:     CALCIUM CITRATE-VITAMIN D3 PO, Take by mouth, Disp: , Rfl:     levonorgestrel (MIRENA) 20 MCG/24HR IUD, by Intrauterine route, Disp: , Rfl:     levothyroxine 50 mcg tablet, Take 1 tablet daily Monday through Thursday and take 2 pills Friday, Saturday and . , Disp: 200 tablet, Rfl: 1    Multiple Vitamins-Minerals (BARIATRIC MULTIVITAMINS/IRON PO), Take by mouth in the morning chewables, Disp: , Rfl:     Multiple Vitamins-Minerals (HAIR SKIN NAILS PO), Take by mouth 2 (two) times a day, Disp: , Rfl:     Review of Systems   Constitutional:  Negative for fatigue and unexpected weight change.    HENT:  Negative for trouble swallowing and voice change. Eyes:  Negative for photophobia and visual disturbance. Respiratory:  Negative for choking and shortness of breath. Gastrointestinal:  Negative for constipation and diarrhea. Endocrine: Negative for cold intolerance and heat intolerance. Musculoskeletal:  Negative for arthralgias and myalgias. Skin:  Negative for rash. Physical Exam:  Body mass index is 36.62 kg/m². /80   Pulse 80   Ht 5' 1.5" (1.562 m)   Wt 89.4 kg (197 lb)   SpO2 99%   BMI 36.62 kg/m²    Wt Readings from Last 3 Encounters:   11/29/23 89.4 kg (197 lb)   11/06/23 91.6 kg (202 lb)   08/04/23 105 kg (232 lb 8 oz)       Physical Exam  Constitutional:       Appearance: Normal appearance. She is obese. Cardiovascular:      Rate and Rhythm: Normal rate and regular rhythm. Pulses: Normal pulses. Pulmonary:      Effort: Pulmonary effort is normal.   Abdominal:      General: Abdomen is flat. Bowel sounds are normal.      Palpations: Abdomen is soft. Skin:     General: Skin is warm and dry. Capillary Refill: Capillary refill takes less than 2 seconds. Neurological:      General: No focal deficit present. Mental Status: She is alert and oriented to person, place, and time.    Psychiatric:         Mood and Affect: Mood normal.         Labs:      Latest Reference Range & Units 10/27/23 08:52   Sodium 135 - 146 mmol/L 140   Potassium 3.5 - 5.3 mmol/L 3.7   Chloride 98 - 110 mmol/L 106   CO2 20 - 32 mmol/L 25   BUN 7 - 25 mg/dL 10   Creatinine 0.50 - 0.99 mg/dL 0.68   SL AMB BUN/CREATININE RATIO 6 - 22 (calc) SEE NOTE:   Glucose, Random 65 - 99 mg/dL 85   Calcium 8.6 - 10.2 mg/dL  8.6 - 10.2 mg/dL 9.3  9.3   AST 10 - 30 U/L 15   ALT 6 - 29 U/L 12   Alkaline Phosphatase 31 - 125 U/L 74   Total Protein 6.1 - 8.1 g/dL 6.5   Albumin 3.6 - 5.1 g/dL 4.2   TOTAL BILIRUBIN 0.2 - 1.2 mg/dL 0.5   eGFR > OR = 60 mL/min/1.73m2 110   Albumin/Globulin Ratio 1.0 - 2.5 (calc) 1.8 Cholesterol <200 mg/dL 153   Triglycerides <150 mg/dL 81   HDL > OR = 50 mg/dL 48 (L)   Non-HDL Cholesterol <130 mg/dL (calc) 105   LDL Calculated mg/dL (calc) 88   Chol HDLC Ratio <5.0 (calc) 3.2   (L): Data is abnormally low     Latest Reference Range & Units 05/18/23 12:23 06/30/23 11:52 10/27/23 09:00   TSH, POC mIU/L 2.02 0.86 1.86   Free T4 0.8 - 1.8 ng/dL 1.0 1.2 1.1     Impression & Plan:    Problem List Items Addressed This Visit          Endocrine    Hypothyroidism    Relevant Medications    levothyroxine 50 mcg tablet    Other Relevant Orders    Hemoglobin A1C    Comprehensive metabolic panel    Lipid panel    TSH, 3rd generation    T4, free    US thyroid       Other    Class 3 severe obesity due to excess calories without serious comorbidity with body mass index (BMI) of 50.0 to 59.9 in adult [facility]) - Primary    Hyperlipidemia    Prediabetes       Orders Placed This Encounter   Procedures    US thyroid     Standing Status:   Future     Standing Expiration Date:   11/29/2027     Scheduling Instructions:      No prep required. Please bring your insurance cards, a form of photo ID and a list of your medications with you. Arrive 15 minutes prior to your appointment time in order to register. To schedule this appointment, please contact Central Scheduling at [phone]. Hemoglobin A1C     Standing Status:   Future     Standing Expiration Date:   11/29/2024    Comprehensive metabolic panel     This is a patient instruction: Patient fasting for 8 hours or longer recommended. Standing Status:   Future     Standing Expiration Date:   11/29/2024    Lipid panel     This is a patient instruction: This test requires patient fasting for 10-12 hours or longer. Drinking of black coffee or black tea is acceptable. Standing Status:   Future     Standing Expiration Date:   11/29/2024    TSH, 3rd generation     This is a patient instruction: This test is non-fasting.  Please drink two glasses of water morning of bloodwork. Standing Status:   Future     Standing Expiration Date:   11/29/2024    T4, free     Standing Status:   Future     Standing Expiration Date:   11/29/2024       There are no Patient Instructions on file for this visit. Patient is a 40yF with hypothyroidism, prediabetes- resolved post bariatric surgery who presents today for follow up     1) Hypothyroidism- clinically and bio-chemically euthyroid. C/w current dose levothyroxine. Repeat TFT in 1 year. Can check PRN sooner if clinically symptomatic     2) Thyromegaly- symptoms improved, no longer having compressive symptoms. Will repeat US in 1 year for 1x follow up. If normal, no repeat needed    3) Prediabetes- now resolved, repeat Lipid and A1C annually d/t risk     4) Class 2 obesity- has been losing weight since surgery, close to 100lbs now, no symptoms of hypoglycemia or dumping syndrome. Reviewed need to take her MV regularly to avoid malabsorption issues     Discussed with the patient and all questioned fully answered. She will call me if any problems arise. Follow-up appointment in 1 year    Counseled patient on diagnostic results, prognosis, risk and benefit of treatment options, instruction for management, importance of treatment compliance, Risk  factor reduction and impressions    I have spent a total time of 40 minutes on 11/29/23 in caring for this patient including Diagnostic results, Prognosis, Risks and benefits of tx options, Instructions for management, Reviewing / ordering tests, medicine, procedures  , and Obtaining or reviewing history  .    Dossie Denver, MD

## 2023-12-19 ENCOUNTER — OFFICE VISIT (OUTPATIENT)
Dept: SLEEP CENTER | Facility: HOSPITAL | Age: 43
End: 2023-12-19
Payer: COMMERCIAL

## 2023-12-19 VITALS
SYSTOLIC BLOOD PRESSURE: 130 MMHG | OXYGEN SATURATION: 98 % | WEIGHT: 192 LBS | HEART RATE: 82 BPM | HEIGHT: 62 IN | DIASTOLIC BLOOD PRESSURE: 78 MMHG | BODY MASS INDEX: 35.33 KG/M2 | RESPIRATION RATE: 18 BRPM

## 2023-12-19 DIAGNOSIS — R51.9 HEADACHE UPON AWAKENING: ICD-10-CM

## 2023-12-19 DIAGNOSIS — E66.9 OBESITY (BMI 30-39.9): ICD-10-CM

## 2023-12-19 DIAGNOSIS — G47.33 OSA (OBSTRUCTIVE SLEEP APNEA): Primary | ICD-10-CM

## 2023-12-19 DIAGNOSIS — F32.A ANXIETY AND DEPRESSION: ICD-10-CM

## 2023-12-19 DIAGNOSIS — R40.0 DAYTIME SLEEPINESS: ICD-10-CM

## 2023-12-19 DIAGNOSIS — F41.9 ANXIETY AND DEPRESSION: ICD-10-CM

## 2023-12-19 DIAGNOSIS — G47.34 SLEEP RELATED HYPOXIA: ICD-10-CM

## 2023-12-19 PROCEDURE — 99214 OFFICE O/P EST MOD 30 MIN: CPT | Performed by: INTERNAL MEDICINE

## 2023-12-19 NOTE — PATIENT INSTRUCTIONS

## 2023-12-19 NOTE — PROGRESS NOTES
Follow-Up Note - [facility]   Courtney Diaz  43 y.o. female  :1980  MRN:[id number]  DOS:2023    CC: I saw this patient for follow-up in clinic today for Sleep disordered breathing, Coexisting Sleep and Medical Problems.  Interval changes: She is using a ResMed machine    Results of prior studies in : HST demonstrated: PRISCA (respiratory event index of) 23.7.  The lowest SpO2 recorded is 73% and 14.9% of the study was spent with saturations below 90%.  The snore index was 0%. During the subsequent therapeutic study, sleep disordered breathing was sufficiently remediated with PAP at 14 H2O.  Since she was not observed during REM, auto titrating PAP 14 -17 cm H2O was recommended     PFSH, Problem List, Medications & Allergies were reviewed in EMR.   She  has a past medical history of Anxiety, CPAP (continuous positive airway pressure) dependence, Diabetes mellitus (HCC), GERD (gastroesophageal reflux disease), Hashimoto's thyroiditis, Hyperlipidemia, Obesity, and Sleep apnea.    She has a current medication list which includes the following prescription(s): bupropion, calcium citrate-vitamin d, levonorgestrel, levothyroxine, multiple vitamins-minerals, and multiple vitamins-minerals.    PHYSIOLOGICAL DATA REVIEW : Using PAP > 4 hours/night 100%. Estimated PRISCA 0.3/hour with pressure of 14.3cm H2O@90th/95th percentile; patient has not been using non FDA approved devices to sanitize the machine.  INTERPRETATION: Compliance is excellent; Pressure setting is:optimal; ;   SUBJECTIVE: With respect to use of PAP, Courtney  is experiencing minimal adverse effects:dry mouth/throat and mask causes redness / facial marks .She derives benefit.  Is satisfied with sleep and daytime function.   Sleep Routine: Courtney reports getting 6-7 hrs sleep; she has no difficulty initiating or maintaining sleep . She arises spontaneously and feels more refreshed since on Rx.Courtney reports significantly improved excessive  "daytime sleepiness,.  She rated herself at Total score: 1 /24 on the [place] Sleepiness Scale.   Other issues: Awakens with headache but less frequently - neck & attributes to pilllow.     Habits: Reports that she quit smoking about 16 months ago. Her smoking use included cigarettes. She started smoking about 3 years ago. She has a 1.2 pack-year smoking history. She has been exposed to tobacco smoke. She has never used smokeless tobacco.,  Reports that she does not currently use alcohol.,  Reports no history of drug use., Caffeine use:very little; Exercise routine: regular.      ROS: Significant for some intentional weight loss.    EXAM: /78   Pulse 82   Resp 18   Ht 5' 1.5\" (1.562 m)   Wt 87.1 kg (192 lb)   SpO2 98%   BMI 35.69 kg/m²     Wt Readings from Last 3 Encounters:   12/19/23 87.1 kg (192 lb)   11/29/23 89.4 kg (197 lb)   11/06/23 91.6 kg (202 lb)      Patient is well groomed; well appearing.   CNS: Alert, orientated, speech clear & coherent  Psych: cooperative and in no distress. Mental state: Appears normal.  H&N: EOMI; NC/AT: No facial pressure marks, no rashes.    Skin/Extrem: col & hydration normal; no edema  Resp: Respiratory effort is normal  Physical findings otherwise essentially unchanged from previous.    IMPRESSION: Problem List Items & Comorbidities Addressed this Visit    1. TAYLOR (obstructive sleep apnea)  PAP DME Resupply/Reorder      2. Sleep related hypoxia        3. Headache upon awakening        4. Daytime sleepiness        5. Anxiety and depression        6. Obesity (BMI 30-39.9)        1-6 improved    PLAN:  I reviewed results of prior studies and physiologic data with the patient.   I discussed treatment options with risks and benefits.  Treatment with  PAP is medically necessary and Courtney is agreable to continue use.   Care of equipment, methods to improve comfort using PAP and importance of compliance with therapy were discussed.  Pressure setting: continue 14-17 cmH2O.  " "  Rx provided to replace supplies and Care coordinated with DME provider.   Encouraged to persist with strategies for weight reduction.    Follow-up is advised in 1 year or sooner if needed to monitor progress, compliance and to adjust therapy.     Thank you for allowing me to participate in the care of this patient.    Sincerely,     Authenticated electronically on 12/19/23   Board Certified Specialist     Portions of the record may have been created with voice recognition software. Occasional wrong word or \"sound a like\" substitutions may have occurred due to the inherent limitations of voice recognition software. There may also be notations and random deletions of words or characters from malfunctioning software. Read the chart carefully and recognize, using context, where substitutions/deletions have occurred.    "

## 2023-12-20 ENCOUNTER — TELEPHONE (OUTPATIENT)
Dept: SLEEP CENTER | Facility: CLINIC | Age: 43
End: 2023-12-20

## 2024-05-01 ENCOUNTER — TELEPHONE (OUTPATIENT)
Dept: BARIATRICS | Facility: CLINIC | Age: 44
End: 2024-05-01

## 2024-05-01 NOTE — TELEPHONE ENCOUNTER
Called patient to reschedule 5/8 appointment as provider on maternity leave, left message to call back to reschedule with Cornelius Yu or Lisa at next available.

## 2024-06-17 ENCOUNTER — TELEPHONE (OUTPATIENT)
Dept: BARIATRICS | Facility: CLINIC | Age: 44
End: 2024-06-17

## 2024-07-24 ENCOUNTER — OFFICE VISIT (OUTPATIENT)
Dept: BARIATRICS | Facility: CLINIC | Age: 44
End: 2024-07-24
Payer: COMMERCIAL

## 2024-07-24 VITALS
TEMPERATURE: 98.8 F | HEIGHT: 62 IN | SYSTOLIC BLOOD PRESSURE: 122 MMHG | DIASTOLIC BLOOD PRESSURE: 70 MMHG | BODY MASS INDEX: 31.28 KG/M2 | WEIGHT: 170 LBS | HEART RATE: 83 BPM

## 2024-07-24 DIAGNOSIS — K91.2 POSTSURGICAL MALABSORPTION: ICD-10-CM

## 2024-07-24 DIAGNOSIS — E06.3 HYPOTHYROIDISM DUE TO HASHIMOTO'S THYROIDITIS: ICD-10-CM

## 2024-07-24 DIAGNOSIS — E16.1 REACTIVE HYPOGLYCEMIA: ICD-10-CM

## 2024-07-24 DIAGNOSIS — E66.9 OBESITY, CLASS I, BMI 30-34.9: ICD-10-CM

## 2024-07-24 DIAGNOSIS — E03.8 HYPOTHYROIDISM DUE TO HASHIMOTO'S THYROIDITIS: ICD-10-CM

## 2024-07-24 DIAGNOSIS — Z48.815 ENCOUNTER FOR SURGICAL AFTERCARE FOLLOWING SURGERY OF DIGESTIVE SYSTEM: Primary | ICD-10-CM

## 2024-07-24 DIAGNOSIS — Z98.84 BARIATRIC SURGERY STATUS: ICD-10-CM

## 2024-07-24 PROCEDURE — 99213 OFFICE O/P EST LOW 20 MIN: CPT | Performed by: PHYSICIAN ASSISTANT

## 2024-07-24 NOTE — PROGRESS NOTES
Assessment/Plan:     Patient ID: Courtney Diaz is a 44 y.o. female.     Bariatric Surgery Status    --s/p Yesenia-En-Y Gastric Bypass with Dr. Cornelius Yu on 4/24/2023. Presents to the office today for annual postop doing well.     On occasional gets shakes and light headedness which improves with protein shot or piece of candy. Advised not going long periods of time without food which she admits has days she does not eat or drink enough. Will continue to monitor and let us know if sxs worsen     Continued/Maintain healthy weight loss with good nutrition intakes.  Adequate hydration with at least 64oz. fluid intake.  Follow diet as discussed.  Follow vitamin and mineral recommendations as reviewed with you.  Exercise as tolerated.    Colonoscopy referral made: due - declines referral   Mammo: utd     Follow-up in 6 months. We kindly ask that your arrive 15 minutes before your scheduled appointment time with your provider to allow our staff to room you, get your vital signs and update your chart.    Get lab work done. Please call the office if you need a script.  It is recommended to check with your insurance BEFORE getting labs done to make sure they are covered by your policy.      Call our office if you have any problems with abdominal pain especially associated with fever, chills, nausea, vomiting or any other concerns.    All  Post-bariatric surgery patients should be aware that very small quantities of any alcohol can cause impairment and it is very possible not to feel the effect. The effect can be in the system for several hours.  It is also a stomach irritant.     It is advised to AVOID alcohol, Nonsteroidal antiinflammatory drugs (NSAIDS) and nicotine of all forms . Any of these can cause stomach irritation/pain.    Discussed the effects of alcohol on a bariatric patient and the increased impairment risk.     Keep up the good work!     Postsurgical Malabsorption   -At risk for malabsorption of vitamins/minerals  secondary to malabsorption and restriction of intake from bariatric surgery  -Currently taking adequate postop bariatric surgery vitamin supplementation  -Next set of bariatric labs ordered for approximately 2 weeks  -Patient received education about the importance of adhering to a lifelong supplementation regimen to avoid vitamin/mineral deficiencies      Diagnoses and all orders for this visit:    Encounter for surgical aftercare following surgery of digestive system  -     Zinc; Future  -     Vitamin D 25 hydroxy; Future  -     Vitamin B12; Future  -     Vitamin B1, whole blood; Future  -     Vitamin A; Future  -     CBC and Platelet; Future  -     Comprehensive metabolic panel; Future  -     Ferritin; Future  -     Folate; Future  -     TIBC Panel (incl. Iron, TIBC, % Iron Saturation); Future  -     PTH, intact; Future    Bariatric surgery status  -     Zinc; Future  -     Vitamin D 25 hydroxy; Future  -     Vitamin B12; Future  -     Vitamin B1, whole blood; Future  -     Vitamin A; Future  -     CBC and Platelet; Future  -     Comprehensive metabolic panel; Future  -     Ferritin; Future  -     Folate; Future  -     TIBC Panel (incl. Iron, TIBC, % Iron Saturation); Future  -     PTH, intact; Future    Postsurgical malabsorption  -     Zinc; Future  -     Vitamin D 25 hydroxy; Future  -     Vitamin B12; Future  -     Vitamin B1, whole blood; Future  -     Vitamin A; Future  -     CBC and Platelet; Future  -     Comprehensive metabolic panel; Future  -     Ferritin; Future  -     Folate; Future  -     TIBC Panel (incl. Iron, TIBC, % Iron Saturation); Future  -     PTH, intact; Future    Obesity, Class I, BMI 30-34.9  -     Zinc; Future  -     Vitamin D 25 hydroxy; Future  -     Vitamin B12; Future  -     Vitamin B1, whole blood; Future  -     Vitamin A; Future  -     CBC and Platelet; Future  -     Comprehensive metabolic panel; Future  -     Ferritin; Future  -     Folate; Future  -     TIBC Panel (incl. Iron,  "TIBC, % Iron Saturation); Future  -     PTH, intact; Future    Hypothyroidism due to Hashimoto's thyroiditis  -     Zinc; Future  -     Vitamin D 25 hydroxy; Future  -     Vitamin B12; Future  -     Vitamin B1, whole blood; Future  -     Vitamin A; Future  -     CBC and Platelet; Future  -     Comprehensive metabolic panel; Future  -     Ferritin; Future  -     Folate; Future  -     TIBC Panel (incl. Iron, TIBC, % Iron Saturation); Future  -     PTH, intact; Future    Reactive hypoglycemia         Subjective:      Patient ID: Courtney Diaz is a 44 y.o. female.    Date of surgery: 4/24/2023  Procedure: RNY  Performing surgeon: Dr Cornelius Yu     Initial Weight - 319.2 lbs   Current Weight - 170.0 lbs  Total Body Weight Loss (EWL)- 149.3 lbs   EWL% - 81%   TWB % - 47%     Current BMI is Body mass index is 31.6 kg/m².    Tolerating a regular diet-yes  Eating at least 60 grams of protein per day-yes  Drinking at least 64 ounces of fluid per day-yes  Sufficient exercise-walking   Using NSAIDs regularly-no  Using nicotine-no  Using alcohol-no  Supplements:  willam mvi + calcium citrate + hair/skin/nails       The following portions of the patient's history were reviewed and updated as appropriate: allergies, current medications, past family history, past medical history, past social history, past surgical history and problem list.    Review of Systems   Constitutional: Negative.    Respiratory: Negative.     Cardiovascular: Negative.    Gastrointestinal: Negative.    Neurological: Negative.    Psychiatric/Behavioral: Negative.           Objective:    /70 (BP Location: Left arm, Patient Position: Sitting, Cuff Size: Large)   Pulse 83   Temp 98.8 °F (37.1 °C) (Tympanic)   Ht 5' 1.5\" (1.562 m)   Wt 77.1 kg (170 lb)   BMI 31.60 kg/m²      Physical Exam  Vitals and nursing note reviewed.   Constitutional:       Appearance: Normal appearance. She is obese.   HENT:      Head: Normocephalic and atraumatic.   Eyes:      " Extraocular Movements: Extraocular movements intact.      Pupils: Pupils are equal, round, and reactive to light.   Cardiovascular:      Rate and Rhythm: Normal rate.   Pulmonary:      Effort: Pulmonary effort is normal.   Musculoskeletal:         General: Normal range of motion.      Cervical back: Normal range of motion.   Skin:     General: Skin is warm and dry.   Neurological:      General: No focal deficit present.      Mental Status: She is alert and oriented to person, place, and time.   Psychiatric:         Mood and Affect: Mood normal.

## 2024-07-24 NOTE — PROGRESS NOTES
Date of surgery: 4/24/2023  Procedure: RNY  Performing surgeon: Dr Cornelius Yu    Initial Weight - 319.2 lbs   Current Weight - 170.0 lbs  Total Body Weight Loss (EWL)- 149.3 lbs   EWL% - 81%   TWB % - 47%

## 2024-08-10 DIAGNOSIS — E06.3 HYPOTHYROIDISM DUE TO HASHIMOTO'S THYROIDITIS: ICD-10-CM

## 2024-08-10 DIAGNOSIS — E03.8 HYPOTHYROIDISM DUE TO HASHIMOTO'S THYROIDITIS: ICD-10-CM

## 2024-08-11 RX ORDER — LEVOTHYROXINE SODIUM 50 UG/1
TABLET ORAL
Qty: 200 TABLET | Refills: 1 | Status: SHIPPED | OUTPATIENT
Start: 2024-08-11

## 2024-08-15 ENCOUNTER — TELEPHONE (OUTPATIENT)
Dept: BARIATRICS | Facility: CLINIC | Age: 44
End: 2024-08-15

## 2024-08-15 NOTE — TELEPHONE ENCOUNTER
Spoke with patient regarding bill received from labs which were completed in December.  Per patient, insurance will not cover lab with E66.1 diagnosis, despite the office sending a letter with an updated code.  Advised patient that I will call [employer] and give them an additional code to use.    Spoke with Ekta at [employer] and added Z98.84 as an additional code for coverage. Per Ekta, patient should allow 30-45 days for this to be reprocessed.    @ [phone] Patient was called and advised of the above.  Advised patient that this should hopefully help cover the labs.  Patient will be on the lookout for a determination from the insurance company.  No additional questions offered at this time.  Direct office number also left for patient to call with any additional concerns.

## 2024-11-23 LAB
T4 FREE SERPL-MCNC: 1.2 NG/DL (ref 0.8–1.8)
TSH SERPL-ACNC: 1.66 MIU/L

## 2024-11-25 ENCOUNTER — RESULTS FOLLOW-UP (OUTPATIENT)
Dept: ENDOCRINOLOGY | Facility: HOSPITAL | Age: 44
End: 2024-11-25

## 2024-12-04 ENCOUNTER — OFFICE VISIT (OUTPATIENT)
Dept: ENDOCRINOLOGY | Facility: HOSPITAL | Age: 44
End: 2024-12-04
Payer: COMMERCIAL

## 2024-12-04 VITALS
HEART RATE: 65 BPM | DIASTOLIC BLOOD PRESSURE: 70 MMHG | WEIGHT: 168 LBS | OXYGEN SATURATION: 100 % | SYSTOLIC BLOOD PRESSURE: 110 MMHG | HEIGHT: 62 IN | BODY MASS INDEX: 30.91 KG/M2

## 2024-12-04 DIAGNOSIS — E01.0 THYROMEGALY: ICD-10-CM

## 2024-12-04 DIAGNOSIS — R73.03 PREDIABETES: ICD-10-CM

## 2024-12-04 DIAGNOSIS — E78.5 HYPERLIPIDEMIA, UNSPECIFIED HYPERLIPIDEMIA TYPE: ICD-10-CM

## 2024-12-04 DIAGNOSIS — E66.813 CLASS 3 SEVERE OBESITY DUE TO EXCESS CALORIES WITHOUT SERIOUS COMORBIDITY WITH BODY MASS INDEX (BMI) OF 50.0 TO 59.9 IN ADULT (HCC): ICD-10-CM

## 2024-12-04 DIAGNOSIS — E66.01 CLASS 3 SEVERE OBESITY DUE TO EXCESS CALORIES WITHOUT SERIOUS COMORBIDITY WITH BODY MASS INDEX (BMI) OF 50.0 TO 59.9 IN ADULT (HCC): ICD-10-CM

## 2024-12-04 DIAGNOSIS — E06.3 HYPOTHYROIDISM DUE TO HASHIMOTO'S THYROIDITIS: Primary | ICD-10-CM

## 2024-12-04 PROCEDURE — 99214 OFFICE O/P EST MOD 30 MIN: CPT | Performed by: NURSE PRACTITIONER

## 2024-12-04 RX ORDER — LEVOTHYROXINE SODIUM 50 UG/1
TABLET ORAL
Qty: 200 TABLET | Refills: 3 | Status: SHIPPED | OUTPATIENT
Start: 2024-12-04

## 2024-12-04 NOTE — PATIENT INSTRUCTIONS
Be mindful of diet.      Exercise regularly.      Stay hydrated.      Continue levothyroxine 50 mcg daily Monday through Thursday with 2 tablets on Friday, Saturday and Sunday.      Obtain thyroid ultrasound when able.

## 2024-12-04 NOTE — PROGRESS NOTES
Courtney Diaz 44 y.o. female MRN: [id number]    Encounter: [id number]      Assessment & Plan     Assessment:  This is a 44 y.o.-year-old female with hypothyroidism due to Hashimoto's thyroiditis, pre diabetes and obesity.     Plan:  1. Thyromegaly:  Her most recent ultrasound of the thyroid showed a heterogeneous thyroid with no discrete nodules.  We will obtain follow-up thyroid ultrasound for further surveillance.     2. Hypothyroidism secondary to Hashimoto's thyroiditis:  She complains of some fatigue.  Her most recent TSH is normal.  For now, continue levothyroxine at current dose.   Recheck TSH and free T4 prior to next office visit.      3. History of prediabetes: She is status post Yesenia-en-Y gastric bypass and has lost nearly 150 pounds.  Her hemoglobin A1c was 5.2.  Continues to work with weight management with regard to diet and regular exercise. Check hemoglobin A1c and comprehensive metabolic panel prior to next visit.    CC: Hypothyroidism due to Hashimoto's thyroiditis     History of Present Illness     HPI:  44 y.o. female for follow-up of Hashimoto's thyroiditis, pre diabetes and obesity.  She reports thyromegaly and thyroid dysfunction was found after pregnancy in the setting of uncontrolled weight gain. She takes levothyroxine 50 mcg daily Monday through Thursday with 2 tablets on Friday, Saturday and Sunday consistently and appropriately.  Her most recent TSH from November 22, 2024 was 1.66 with a free T4 of 1.2.  Her most recent thyroid ultrasound from June 5, 2018 shows a grossly stable thyroid heterogeneous in nature with no discrete nodules. Overall, she notes she feels okay. She does report fatigue.  She is concerned about her difficulty with weight loss.  She also had diagnosis of prediabetes in 2018.  She has recently undergone gastric bypass surgery on April 24, 2023.  Her hemoglobin A1c performed on November 14, 2024 is 5.2.  She denies any polydipsia, polyuria or polyphagia.  She  denies family history of thyroid cancer, but does note her mother had an overactive thyroid had her thyroid removed. She denies any anterior neck discomfort, dysphagia or dysphonia.      Review of Systems   Constitutional:  Positive for fatigue. Negative for chills and fever.   HENT:  Positive for trouble swallowing. Negative for voice change.    Eyes: Negative.  Negative for photophobia, pain, discharge, redness, itching and visual disturbance.   Respiratory: Negative.  Negative for chest tightness and shortness of breath.    Cardiovascular: Negative.  Negative for chest pain.   Gastrointestinal: Negative.  Negative for abdominal pain, constipation, diarrhea and vomiting.   Endocrine: Negative for cold intolerance, heat intolerance, polydipsia, polyphagia and polyuria.   Genitourinary: Negative.    Musculoskeletal:  Positive for arthralgias.   Skin: Negative.    Allergic/Immunologic: Negative.    Neurological: Negative.  Negative for dizziness, syncope, light-headedness and headaches.   Hematological: Negative.    Psychiatric/Behavioral: Negative.     All other systems reviewed and are negative.      Historical Information   Past Medical History:   Diagnosis Date    Anxiety     CPAP (continuous positive airway pressure) dependence     Diabetes mellitus (HCC)     Disease of thyroid gland     GERD (gastroesophageal reflux disease)     Hashimoto's thyroiditis     Hyperlipidemia     Obesity     Sleep apnea      Past Surgical History:   Procedure Laterality Date     SECTION      EGD      HI LAPS GSTR RSTCV PX W/BYP SUSHMA-EN-Y LIMB <150 CM N/A 2023    Procedure: BYPASS GASTRIC R-N-Y  W ROBOT;  Surgeon: Janine Yu MD;  Location: [facility];  Service: Bariatrics    WISDOM TOOTH EXTRACTION       Social History   Social History     Substance and Sexual Activity   Alcohol Use Not Currently     Social History     Substance and Sexual Activity   Drug Use Never     Social History     Tobacco Use   Smoking Status  "Former    Current packs/day: 0.00    Average packs/day: 0.3 packs/day for 5.2 years (1.3 ttl pk-yrs)    Types: Cigarettes    Start date: 2020    Quit date: 2022    Years since quittin.3    Passive exposure: Past   Smokeless Tobacco Never     Family History:   Family History   Problem Relation Age of Onset    Hypothyroidism Mother             Lung cancer Mother     Cancer Mother             Hyperlipidemia Father     Hypertension Father     Diabetes type II Paternal Grandmother             Diabetes type II Maternal Aunt     Diabetes type I Maternal Aunt     Diabetes type II Maternal Uncle     Diabetes type II Paternal Uncle     Diabetes type I Paternal Uncle     Diabetes type II Paternal Uncle        Meds/Allergies   Current Outpatient Medications   Medication Sig Dispense Refill    buPROPion (WELLBUTRIN) 100 mg tablet Take 100 mg by mouth 2 (two) times a day      CALCIUM CITRATE-VITAMIN D3 PO Take by mouth      levonorgestrel (MIRENA) 20 MCG/24HR IUD by Intrauterine route      levothyroxine 50 mcg tablet TAKE 1 TABLET BY MOUTH DAILY MONDAY through thursday AND 2 TABLETS ON FRIDAY, SATURDAY,  200 tablet 1    Multiple Vitamins-Minerals (BARIATRIC MULTIVITAMINS/IRON PO) Take by mouth in the morning chewables      Multiple Vitamins-Minerals (HAIR SKIN NAILS PO) Take by mouth 2 (two) times a day       No current facility-administered medications for this visit.     No Known Allergies    Objective   Vitals: Blood pressure 110/70, pulse 65, height 5' 1.5\" (1.562 m), weight 76.2 kg (168 lb), SpO2 100%, not currently breastfeeding.    Physical Exam  Vitals reviewed.   Constitutional:       Appearance: She is well-developed. She is obese.   HENT:      Head: Normocephalic and atraumatic.   Eyes:      Conjunctiva/sclera: Conjunctivae normal.      Pupils: Pupils are equal, round, and reactive to light.   Cardiovascular:      Rate and Rhythm: Normal rate and regular rhythm.      Heart " "sounds: Normal heart sounds.   Pulmonary:      Effort: Pulmonary effort is normal.      Breath sounds: Normal breath sounds.   Abdominal:      General: Bowel sounds are normal.      Palpations: Abdomen is soft.   Musculoskeletal:         General: Normal range of motion.      Cervical back: Normal range of motion and neck supple.   Skin:     General: Skin is warm and dry.   Neurological:      Mental Status: She is alert and oriented to person, place, and time.   Psychiatric:         Behavior: Behavior normal.         Thought Content: Thought content normal.         Judgment: Judgment normal.         Lab Results:   Lab Results   Component Value Date/Time    Free t4 1.2 11/22/2024 12:13 PM       Imaging Studies:   Results for orders placed in visit on 06/05/18    US thyroid      Portions of the record may have been created with voice recognition software. Occasional wrong word or \"sound a like\" substitutions may have occurred due to the inherent limitations of voice recognition software. Read the chart carefully and recognize, using context, where substitutions have occurred.    "

## 2024-12-17 ENCOUNTER — OFFICE VISIT (OUTPATIENT)
Dept: SLEEP CENTER | Facility: HOSPITAL | Age: 44
End: 2024-12-17
Payer: COMMERCIAL

## 2024-12-17 VITALS
SYSTOLIC BLOOD PRESSURE: 122 MMHG | HEIGHT: 62 IN | DIASTOLIC BLOOD PRESSURE: 70 MMHG | BODY MASS INDEX: 31.1 KG/M2 | WEIGHT: 169 LBS

## 2024-12-17 DIAGNOSIS — G47.33 OSA (OBSTRUCTIVE SLEEP APNEA): Primary | ICD-10-CM

## 2024-12-17 DIAGNOSIS — R51.9 HEADACHE UPON AWAKENING: ICD-10-CM

## 2024-12-17 DIAGNOSIS — E66.9 OBESITY (BMI 30-39.9): ICD-10-CM

## 2024-12-17 DIAGNOSIS — F41.9 ANXIETY AND DEPRESSION: ICD-10-CM

## 2024-12-17 DIAGNOSIS — G47.34 SLEEP RELATED HYPOXIA: ICD-10-CM

## 2024-12-17 DIAGNOSIS — R40.0 DAYTIME SLEEPINESS: ICD-10-CM

## 2024-12-17 DIAGNOSIS — F32.A ANXIETY AND DEPRESSION: ICD-10-CM

## 2024-12-17 DIAGNOSIS — R63.4 WEIGHT REDUCTION: ICD-10-CM

## 2024-12-17 PROCEDURE — 99214 OFFICE O/P EST MOD 30 MIN: CPT | Performed by: INTERNAL MEDICINE

## 2024-12-17 NOTE — PROGRESS NOTES
Follow-Up Note - Sleep Center   Courtney Diaz  44 y.o. female  :1980  MRN:[id number]  DOS:2024    CC: I saw this patient for follow-up in clinic today for Sleep disordered breathing, Coexisting Sleep and Medical Problems.. Interval changes: None reported.      HST in  demonstrated: PRISCA (respiratory event index of) 23.7.  The lowest SpO2 recorded is 73% and 14.9% of the study was spent with saturations below 90%.  The snore index was 0%      During the subsequent therapeutic study, sleep disordered breathing was sufficiently remediated with PAP at 14 H2O.  Since she was not observed during REM, auto titrating PAP 14 -17 cm H2O was recommended       PFSH, Problem List, Medications & Allergies were reviewed in EMR.   She  has a past medical history of Anxiety, CPAP (continuous positive airway pressure) dependence, Diabetes mellitus (HCC), Disease of thyroid gland, GERD (gastroesophageal reflux disease), Hashimoto's thyroiditis, Hyperlipidemia, Obesity, and Sleep apnea.    She has a current medication list which includes the following prescription(s): bupropion, calcium citrate-vitamin d, levonorgestrel, levothyroxine, multiple vitamins-minerals, and multiple vitamins-minerals.    PHYSIOLOGICAL DATA REVIEW : Device has been used 24/30 days and average usage is 6 hours and 6 minutes using PAP > 4 hours/night 77%. Estimated PRISCA 0.5/hour with pressure of 14.5cm H2O@90th/95th percentile; use is limited by on occasion she has not used the machine because of childcare.  She feels no difference in sleep quality even when not using CPAP.  INTERPRETATION: Compliance is good; Pressure setting is:optimal; ;   SUBJECTIVE: With respect to use of PAP, Courtney  is experiencing some adverse effects: dry mouth/throat.She unsure if derives benefit.  Since weight reduction, feels satisfied with sleep and daytime function.   Sleep Routine: Courtney reports getting (Patient-Rptd) (P) 7 hrs sleep; she has no difficulty  "initiating or maintaining sleep . She arises spontaneously and (Patient-Rptd) (P) Usually feels refreshed.Courtney (Patient-Rptd) (P) Rarely]reports daytime sleepiness,.  She rated herself at Total score: (Patient-Rptd) (P) 2 /24 on the [place] Sleepiness Scale.   Other issues: No longer awakens with headache even when not using CPAP.     Habits: Reports that she quit smoking about 2 years ago. Her smoking use included cigarettes. She started smoking about 4 years ago. She has a 1.3 pack-year smoking history. She has been exposed to tobacco smoke. She has never used smokeless tobacco.,  Reports that she does not currently use alcohol.,  Reports no history of drug use., Caffeine use:limited until  ; Exercise routine: regular.      ROS: Significant for further weight reduction of around 20 pounds in the past year and weight now stable within a few pounds..  She is needing a lower dose of bupropion and anxiety is controlled.  Review of systems was otherwise negative.    EXAM: /70   Ht 5' 1.5\" (1.562 m)   Wt 76.7 kg (169 lb)   BMI 31.42 kg/m²     Wt Readings from Last 3 Encounters:   12/17/24 76.7 kg (169 lb)   12/04/24 76.2 kg (168 lb)   07/24/24 77.1 kg (170 lb)      Patient is well groomed; well appearing.   CNS: Alert, orientated, speech clear & coherent  Psych: cooperative and in no distress. Mental state: Appears normal.  H&N: EOMI; NC/AT: No facial pressure marks, no rashes.    Skin/Extrem: col & hydration normal; no edema  Resp: Respiratory effort is normal  Physical findings otherwise essentially unchanged from previous.    IMPRESSION: Problem List Items & Comorbidities Addressed this Visit    1. TAYLOR (obstructive sleep apnea)  Home Study      2. Sleep related hypoxia  Home Study      3. Weight reduction  Home Study      4. Headache upon awakening        5. Daytime sleepiness        6. Anxiety and depression        7. Obesity (BMI 30-39.9)        Above improved or resolved    PLAN:  I reviewed results of " "prior studies and physiologic data with the patient.   I discussed treatment options with risks and benefits.  Treatment with  PAP is medically necessary and Courtney is agreable to continue use.   Care of equipment, methods to improve comfort using PAP and importance of compliance with therapy were discussed.  Pressure setting: change 10-15 cmH2O using a fullface mask.    Rx provided to replace supplies and Care coordinated with DME provider.   Encouraged to persist with strategies for weight reduction/continuance.    Repeat home sleep testing in view of significant weight reduction.  She was instructed to discontinue CPAP for at least a week preceding the study.  Follow-up is advised i after the study to monitor progress, compliance and to adjust therapy.     Thank you for allowing me to participate in the care of this patient.    Sincerely,     Authenticated electronically on 12/17/24   Board Certified Specialist     Portions of the record may have been created with voice recognition software. Occasional wrong word or \"sound a like\" substitutions may have occurred due to the inherent limitations of voice recognition software. There may also be notations and random deletions of words or characters from malfunctioning software. Read the chart carefully and recognize, using context, where substitutions/deletions have occurred.    "

## 2025-01-15 ENCOUNTER — HOSPITAL ENCOUNTER (OUTPATIENT)
Facility: HOSPITAL | Age: 45
Discharge: HOME | End: 2025-01-15
Attending: INTERNAL MEDICINE
Payer: COMMERCIAL

## 2025-01-15 DIAGNOSIS — G47.34 SLEEP RELATED HYPOXIA: ICD-10-CM

## 2025-01-15 DIAGNOSIS — R63.4 WEIGHT REDUCTION: ICD-10-CM

## 2025-01-15 DIAGNOSIS — G47.33 OSA (OBSTRUCTIVE SLEEP APNEA): ICD-10-CM

## 2025-01-15 PROCEDURE — G0399 HOME SLEEP TEST/TYPE 3 PORTA: HCPCS

## 2025-01-15 NOTE — PROGRESS NOTES
Home Sleep Study Documentation    HOME STUDY DEVICE: Noxturnal no                                           Milly G3 yes  #21      Pre-Sleep Home Study:    Set-up and instructions performed by: PONCE Potts    Technician performed demonstration for Patient: yes    Return demonstration performed by Patient: yes    Written instructions provided to Patient: yes    Patient signed consent form: yes        Post-Sleep Home Study:    Additional comments by Patient: pending    Home Sleep Study Failed:pending    Failure reason: pending    Reported or Detected: pending    Scored by: pending

## 2025-01-21 PROBLEM — G47.30 SLEEP-DISORDERED BREATHING: Status: ACTIVE | Noted: 2025-01-21

## 2025-01-21 PROCEDURE — 95806 SLEEP STUDY UNATT&RESP EFFT: CPT | Performed by: INTERNAL MEDICINE

## 2025-01-29 ENCOUNTER — TELEPHONE (OUTPATIENT)
Dept: SLEEP CENTER | Facility: CLINIC | Age: 45
End: 2025-01-29

## 2025-01-29 NOTE — TELEPHONE ENCOUNTER
Home sleep study resulted and does not show sleep apnea. Previously diagnosed sleep apnea resolved. Follow up with Dr. Moralez to review results and treatment options recommended.     Results read by patient.     Called patient and left message advising I will send a [employer] message with the sleep study results and next steps.  Provided sleep center number([phone]) to call if any questions.

## 2025-04-12 LAB
25(OH)D3 SERPL-MCNC: 52 NG/ML (ref 30–100)
ALBUMIN SERPL-MCNC: 4.3 G/DL (ref 3.6–5.1)
ALBUMIN/GLOB SERPL: 2 (CALC) (ref 1–2.5)
ALP SERPL-CCNC: 42 U/L (ref 31–125)
ALT SERPL-CCNC: 12 U/L (ref 6–29)
AST SERPL-CCNC: 16 U/L (ref 10–35)
BASOPHILS # BLD AUTO: 49 CELLS/UL (ref 0–200)
BASOPHILS NFR BLD AUTO: 1.3 %
BILIRUB SERPL-MCNC: 0.6 MG/DL (ref 0.2–1.2)
BUN SERPL-MCNC: 13 MG/DL (ref 7–25)
BUN/CREAT SERPL: NORMAL (CALC) (ref 6–22)
CALCIUM SERPL-MCNC: 9.3 MG/DL (ref 8.6–10.2)
CALCIUM SERPL-MCNC: 9.3 MG/DL (ref 8.6–10.2)
CHLORIDE SERPL-SCNC: 105 MMOL/L (ref 98–110)
CO2 SERPL-SCNC: 28 MMOL/L (ref 20–32)
CREAT SERPL-MCNC: 0.74 MG/DL (ref 0.5–0.99)
EOSINOPHIL # BLD AUTO: 129 CELLS/UL (ref 15–500)
EOSINOPHIL NFR BLD AUTO: 3.4 %
ERYTHROCYTE [DISTWIDTH] IN BLOOD BY AUTOMATED COUNT: 11.6 % (ref 11–15)
FERRITIN SERPL-MCNC: 74 NG/ML (ref 16–232)
FOLATE SERPL-MCNC: 22.4 NG/ML
GFR/BSA.PRED SERPLBLD CYS-BASED-ARV: 102 ML/MIN/1.73M2
GLOBULIN SER CALC-MCNC: 2.2 G/DL (CALC) (ref 1.9–3.7)
GLUCOSE SERPL-MCNC: 84 MG/DL (ref 65–99)
HCT VFR BLD AUTO: 39.6 % (ref 35–45)
HGB BLD-MCNC: 13.5 G/DL (ref 11.7–15.5)
IRON SATN MFR SERPL: 40 % (CALC) (ref 16–45)
IRON SERPL-MCNC: 128 MCG/DL (ref 40–190)
LYMPHOCYTES # BLD AUTO: 1189 CELLS/UL (ref 850–3900)
LYMPHOCYTES NFR BLD AUTO: 31.3 %
MCH RBC QN AUTO: 31.4 PG (ref 27–33)
MCHC RBC AUTO-ENTMCNC: 34.1 G/DL (ref 32–36)
MCV RBC AUTO: 92.1 FL (ref 80–100)
MONOCYTES # BLD AUTO: 315 CELLS/UL (ref 200–950)
MONOCYTES NFR BLD AUTO: 8.3 %
NEUTROPHILS # BLD AUTO: 2117 CELLS/UL (ref 1500–7800)
NEUTROPHILS NFR BLD AUTO: 55.7 %
PLATELET # BLD AUTO: 221 THOUSAND/UL (ref 140–400)
PMV BLD REES-ECKER: 10.5 FL (ref 7.5–12.5)
POTASSIUM SERPL-SCNC: 4 MMOL/L (ref 3.5–5.3)
PROT SERPL-MCNC: 6.5 G/DL (ref 6.1–8.1)
PTH-INTACT SERPL-MCNC: 31 PG/ML (ref 16–77)
RBC # BLD AUTO: 4.3 MILLION/UL (ref 3.8–5.1)
SODIUM SERPL-SCNC: 138 MMOL/L (ref 135–146)
TIBC SERPL-MCNC: 324 MCG/DL (CALC) (ref 250–450)
VIT B12 SERPL-MCNC: 677 PG/ML (ref 200–1100)
WBC # BLD AUTO: 3.8 THOUSAND/UL (ref 3.8–10.8)

## 2025-04-14 LAB
25(OH)D3 SERPL-MCNC: 52 NG/ML (ref 30–100)
ALBUMIN SERPL-MCNC: 4.3 G/DL (ref 3.6–5.1)
ALBUMIN/GLOB SERPL: 2 (CALC) (ref 1–2.5)
ALP SERPL-CCNC: 42 U/L (ref 31–125)
ALT SERPL-CCNC: 12 U/L (ref 6–29)
AST SERPL-CCNC: 16 U/L (ref 10–35)
BASOPHILS # BLD AUTO: 49 CELLS/UL (ref 0–200)
BASOPHILS NFR BLD AUTO: 1.3 %
BILIRUB SERPL-MCNC: 0.6 MG/DL (ref 0.2–1.2)
BUN SERPL-MCNC: 13 MG/DL (ref 7–25)
BUN/CREAT SERPL: NORMAL (CALC) (ref 6–22)
CALCIUM SERPL-MCNC: 9.3 MG/DL (ref 8.6–10.2)
CALCIUM SERPL-MCNC: 9.3 MG/DL (ref 8.6–10.2)
CHLORIDE SERPL-SCNC: 105 MMOL/L (ref 98–110)
CO2 SERPL-SCNC: 28 MMOL/L (ref 20–32)
CREAT SERPL-MCNC: 0.74 MG/DL (ref 0.5–0.99)
EOSINOPHIL # BLD AUTO: 129 CELLS/UL (ref 15–500)
EOSINOPHIL NFR BLD AUTO: 3.4 %
ERYTHROCYTE [DISTWIDTH] IN BLOOD BY AUTOMATED COUNT: 11.6 % (ref 11–15)
FERRITIN SERPL-MCNC: 74 NG/ML (ref 16–232)
FOLATE SERPL-MCNC: 22.4 NG/ML
GFR/BSA.PRED SERPLBLD CYS-BASED-ARV: 102 ML/MIN/1.73M2
GLOBULIN SER CALC-MCNC: 2.2 G/DL (CALC) (ref 1.9–3.7)
GLUCOSE SERPL-MCNC: 84 MG/DL (ref 65–99)
HCT VFR BLD AUTO: 39.6 % (ref 35–45)
HGB BLD-MCNC: 13.5 G/DL (ref 11.7–15.5)
IRON SATN MFR SERPL: 40 % (CALC) (ref 16–45)
IRON SERPL-MCNC: 128 MCG/DL (ref 40–190)
LYMPHOCYTES # BLD AUTO: 1189 CELLS/UL (ref 850–3900)
LYMPHOCYTES NFR BLD AUTO: 31.3 %
MCH RBC QN AUTO: 31.4 PG (ref 27–33)
MCHC RBC AUTO-ENTMCNC: 34.1 G/DL (ref 32–36)
MCV RBC AUTO: 92.1 FL (ref 80–100)
MONOCYTES # BLD AUTO: 315 CELLS/UL (ref 200–950)
MONOCYTES NFR BLD AUTO: 8.3 %
NEUTROPHILS # BLD AUTO: 2117 CELLS/UL (ref 1500–7800)
NEUTROPHILS NFR BLD AUTO: 55.7 %
PLATELET # BLD AUTO: 221 THOUSAND/UL (ref 140–400)
PMV BLD REES-ECKER: 10.5 FL (ref 7.5–12.5)
POTASSIUM SERPL-SCNC: 4 MMOL/L (ref 3.5–5.3)
PROT SERPL-MCNC: 6.5 G/DL (ref 6.1–8.1)
PTH-INTACT SERPL-MCNC: 31 PG/ML (ref 16–77)
RBC # BLD AUTO: 4.3 MILLION/UL (ref 3.8–5.1)
SODIUM SERPL-SCNC: 138 MMOL/L (ref 135–146)
TIBC SERPL-MCNC: 324 MCG/DL (CALC) (ref 250–450)
VIT A SERPL-MCNC: 64 MCG/DL (ref 38–98)
VIT B12 SERPL-MCNC: 677 PG/ML (ref 200–1100)
WBC # BLD AUTO: 3.8 THOUSAND/UL (ref 3.8–10.8)
ZINC SERPL-MCNC: 69 MCG/DL (ref 60–130)

## 2025-04-18 ENCOUNTER — OFFICE VISIT (OUTPATIENT)
Dept: BARIATRICS | Facility: CLINIC | Age: 45
End: 2025-04-18
Payer: COMMERCIAL

## 2025-04-18 VITALS
BODY MASS INDEX: 31.47 KG/M2 | HEIGHT: 62 IN | SYSTOLIC BLOOD PRESSURE: 120 MMHG | OXYGEN SATURATION: 99 % | DIASTOLIC BLOOD PRESSURE: 78 MMHG | HEART RATE: 76 BPM | WEIGHT: 171 LBS | TEMPERATURE: 98.1 F

## 2025-04-18 DIAGNOSIS — E06.3 HYPOTHYROIDISM DUE TO HASHIMOTO THYROIDITIS: ICD-10-CM

## 2025-04-18 DIAGNOSIS — Z98.84 BARIATRIC SURGERY STATUS: ICD-10-CM

## 2025-04-18 DIAGNOSIS — F41.9 ANXIETY: ICD-10-CM

## 2025-04-18 DIAGNOSIS — Z12.11 ENCOUNTER FOR SCREENING COLONOSCOPY: ICD-10-CM

## 2025-04-18 DIAGNOSIS — Z48.815 ENCOUNTER FOR SURGICAL AFTERCARE FOLLOWING SURGERY OF DIGESTIVE SYSTEM: Primary | ICD-10-CM

## 2025-04-18 DIAGNOSIS — K91.2 POSTSURGICAL MALABSORPTION: ICD-10-CM

## 2025-04-18 PROCEDURE — 99214 OFFICE O/P EST MOD 30 MIN: CPT | Performed by: PHYSICIAN ASSISTANT

## 2025-04-18 NOTE — PROGRESS NOTES
Assessment/Plan:     Patient ID: Courtney Diaz is a 45 y.o. female.     Bariatric Surgery Status    --s/p Yesenia-En-Y Gastric Bypass with Dr. Cornelius Yu on 4/24/2023. Presents to the office today for annual postop doing well.      On occasional gets shakes and light headedness which improves with protein shot or piece of candy. Advised not going long periods of time without food which she admits has days she does not eat or drink enough. Will continue to monitor and let us know if sxs worsen       Continued/Maintain healthy weight loss with good nutrition intakes.  Adequate hydration with at least 64oz. fluid intake.  Follow diet as discussed.  Follow vitamin and mineral recommendations as reviewed with you.  Exercise as tolerated.    Colonoscopy referral made: yes  Mammo: utd    Follow-up in 1 year. We kindly ask that your arrive 15 minutes before your scheduled appointment time with your provider to allow our staff to room you, get your vital signs and update your chart.    Get lab work done. Please call the office if you need a script.  It is recommended to check with your insurance BEFORE getting labs done to make sure they are covered by your policy.      Call our office if you have any problems with abdominal pain especially associated with fever, chills, nausea, vomiting or any other concerns.    All  Post-bariatric surgery patients should be aware that very small quantities of any alcohol can cause impairment and it is very possible not to feel the effect. The effect can be in the system for several hours.  It is also a stomach irritant.     It is advised to AVOID alcohol, Nonsteroidal antiinflammatory drugs (NSAIDS) and nicotine of all forms . Any of these can cause stomach irritation/pain.    Discussed the effects of alcohol on a bariatric patient and the increased impairment risk.     Keep up the good work!     Postsurgical Malabsorption   -At risk for malabsorption of vitamins/minerals secondary to  "malabsorption and restriction of intake from bariatric surgery  -Currently taking adequate postop bariatric surgery vitamin supplementation  - recent labs still pending   -Patient received education about the importance of adhering to a lifelong supplementation regimen to avoid vitamin/mineral deficiencies      Diagnoses and all orders for this visit:    Encounter for surgical aftercare following surgery of digestive system    Bariatric surgery status    Postsurgical malabsorption    Anxiety    Hypothyroidism due to Hashimoto thyroiditis    Encounter for screening colonoscopy  -     Ambulatory referral for colon cancer education; Future         Subjective:      Patient ID: Courtney Diaz is a 45 y.o. female.  --s/p Yesenia-En-Y Gastric Bypass with Dr. Cornelius Yu on 4/24/2023. Presents to the office today for annual postop doing well. Tolerating diet without issues; denies N/V, dysphagia, reflux.   Initial: 319  Current: 171  EWL: (Weight loss is ahead of schedule at this post surgical period.)  Current BMI is Body mass index is 31.79 kg/m².    Tolerating a regular diet-yes  Eating at least 60 grams of protein per day-yes  Drinking at least 64 ounces of fluid per day-yes  Sufficient exercise-walking   Using nicotine-no  Using alcohol-no  Supplements:  willam MVI + calcium citrate + hair/skin/nails    The following portions of the patient's history were reviewed and updated as appropriate: allergies, current medications, past family history, past medical history, past social history, past surgical history and problem list.    Review of Systems   Constitutional: Negative.    Respiratory: Negative.     Cardiovascular: Negative.    Gastrointestinal: Negative.    Neurological: Negative.    Psychiatric/Behavioral: Negative.           Objective:    /78 (BP Location: Left arm, Patient Position: Sitting, Cuff Size: Large)   Pulse 76   Temp 98.1 °F (36.7 °C) (Tympanic)   Ht 5' 1.5\" (1.562 m)   Wt 77.6 kg (171 lb)   SpO2 99% "   BMI 31.79 kg/m²      Physical Exam  Vitals and nursing note reviewed.   Constitutional:       Appearance: Normal appearance. She is obese.   HENT:      Head: Normocephalic and atraumatic.   Eyes:      Extraocular Movements: Extraocular movements intact.   Cardiovascular:      Rate and Rhythm: Normal rate.   Pulmonary:      Effort: Pulmonary effort is normal.   Musculoskeletal:         General: Normal range of motion.      Cervical back: Normal range of motion.   Skin:     General: Skin is warm and dry.   Neurological:      General: No focal deficit present.      Mental Status: She is alert and oriented to person, place, and time.   Psychiatric:         Mood and Affect: Mood normal.

## 2025-04-18 NOTE — PATIENT INSTRUCTIONS
Follow-up in 1 year. We kindly ask that your arrive 15 minutes before your scheduled appointment time with your provider to allow our staff to room you, get your vital signs and update your chart.    Get lab work done. Please call the office if you need a script.  It is recommended to check with your insurance BEFORE getting labs done to make sure they are covered by your policy.      Call our office if you have any problems with abdominal pain especially associated with fever, chills, nausea, vomiting or any other concerns.    All  Post-bariatric surgery patients should be aware that very small quantities of any alcohol can cause impairment and it is very possible not to feel the effect. The effect can be in the system for several hours.  It is also a stomach irritant.     It is advised to AVOID alcohol, Nonsteroidal antiinflammatory drugs (NSAIDS) and nicotine of all forms . Any of these can cause stomach irritation/pain.    Discussed the effects of alcohol on a bariatric patient and the increased impairment risk.     Keep up the good work!

## 2025-04-18 NOTE — PROGRESS NOTES
Date of surgery: 4/24/2023  Procedure: RNY gastric bypass  Performing surgeon: Dr. Cornelius Yu    Initial Weight - 319.2 lbs.  Current Weight - 171.0 lbs.  Jerrell Weight - 170.0 lbs.  Total Body Weight Loss (EWL) - 148.1 lbs.  EWL% - 80%  TWB% - 46%

## 2025-04-21 ENCOUNTER — RESULTS FOLLOW-UP (OUTPATIENT)
Dept: ENDOCRINOLOGY | Facility: HOSPITAL | Age: 45
End: 2025-04-21

## 2025-04-21 LAB
25(OH)D3 SERPL-MCNC: 52 NG/ML (ref 30–100)
ALBUMIN SERPL-MCNC: 4.3 G/DL (ref 3.6–5.1)
ALBUMIN/GLOB SERPL: 2 (CALC) (ref 1–2.5)
ALP SERPL-CCNC: 42 U/L (ref 31–125)
ALT SERPL-CCNC: 12 U/L (ref 6–29)
AST SERPL-CCNC: 16 U/L (ref 10–35)
BASOPHILS # BLD AUTO: 49 CELLS/UL (ref 0–200)
BASOPHILS NFR BLD AUTO: 1.3 %
BILIRUB SERPL-MCNC: 0.6 MG/DL (ref 0.2–1.2)
BUN SERPL-MCNC: 13 MG/DL (ref 7–25)
BUN/CREAT SERPL: NORMAL (CALC) (ref 6–22)
CALCIUM SERPL-MCNC: 9.3 MG/DL (ref 8.6–10.2)
CALCIUM SERPL-MCNC: 9.3 MG/DL (ref 8.6–10.2)
CHLORIDE SERPL-SCNC: 105 MMOL/L (ref 98–110)
CO2 SERPL-SCNC: 28 MMOL/L (ref 20–32)
CREAT SERPL-MCNC: 0.74 MG/DL (ref 0.5–0.99)
EOSINOPHIL # BLD AUTO: 129 CELLS/UL (ref 15–500)
EOSINOPHIL NFR BLD AUTO: 3.4 %
ERYTHROCYTE [DISTWIDTH] IN BLOOD BY AUTOMATED COUNT: 11.6 % (ref 11–15)
FERRITIN SERPL-MCNC: 74 NG/ML (ref 16–232)
FOLATE SERPL-MCNC: 22.4 NG/ML
GFR/BSA.PRED SERPLBLD CYS-BASED-ARV: 102 ML/MIN/1.73M2
GLOBULIN SER CALC-MCNC: 2.2 G/DL (CALC) (ref 1.9–3.7)
GLUCOSE SERPL-MCNC: 84 MG/DL (ref 65–99)
HCT VFR BLD AUTO: 39.6 % (ref 35–45)
HGB BLD-MCNC: 13.5 G/DL (ref 11.7–15.5)
IRON SATN MFR SERPL: 40 % (CALC) (ref 16–45)
IRON SERPL-MCNC: 128 MCG/DL (ref 40–190)
LYMPHOCYTES # BLD AUTO: 1189 CELLS/UL (ref 850–3900)
LYMPHOCYTES NFR BLD AUTO: 31.3 %
MCH RBC QN AUTO: 31.4 PG (ref 27–33)
MCHC RBC AUTO-ENTMCNC: 34.1 G/DL (ref 32–36)
MCV RBC AUTO: 92.1 FL (ref 80–100)
MONOCYTES # BLD AUTO: 315 CELLS/UL (ref 200–950)
MONOCYTES NFR BLD AUTO: 8.3 %
NEUTROPHILS # BLD AUTO: 2117 CELLS/UL (ref 1500–7800)
NEUTROPHILS NFR BLD AUTO: 55.7 %
PLATELET # BLD AUTO: 221 THOUSAND/UL (ref 140–400)
PMV BLD REES-ECKER: 10.5 FL (ref 7.5–12.5)
POTASSIUM SERPL-SCNC: 4 MMOL/L (ref 3.5–5.3)
PROT SERPL-MCNC: 6.5 G/DL (ref 6.1–8.1)
PTH-INTACT SERPL-MCNC: 31 PG/ML (ref 16–77)
RBC # BLD AUTO: 4.3 MILLION/UL (ref 3.8–5.1)
SODIUM SERPL-SCNC: 138 MMOL/L (ref 135–146)
TIBC SERPL-MCNC: 324 MCG/DL (CALC) (ref 250–450)
VIT A SERPL-MCNC: 64 MCG/DL (ref 38–98)
VIT B1 BLD-SCNC: 211 NMOL/L (ref 78–185)
VIT B12 SERPL-MCNC: 677 PG/ML (ref 200–1100)
WBC # BLD AUTO: 3.8 THOUSAND/UL (ref 3.8–10.8)
ZINC SERPL-MCNC: 69 MCG/DL (ref 60–130)

## 2025-07-07 DIAGNOSIS — Z00.6 ENCOUNTER FOR EXAMINATION FOR NORMAL COMPARISON OR CONTROL IN CLINICAL RESEARCH PROGRAM: ICD-10-CM

## 2025-07-12 ENCOUNTER — APPOINTMENT (OUTPATIENT)
Dept: LAB | Facility: CLINIC | Age: 45
End: 2025-07-12

## 2025-07-12 DIAGNOSIS — Z00.6 ENCOUNTER FOR EXAMINATION FOR NORMAL COMPARISON OR CONTROL IN CLINICAL RESEARCH PROGRAM: ICD-10-CM

## 2025-07-12 PROCEDURE — 36415 COLL VENOUS BLD VENIPUNCTURE: CPT

## 2025-07-21 LAB
APOB+LDLR+PCSK9 GENE MUT ANL BLD/T: NOT DETECTED
BRCA1+BRCA2 DEL+DUP + FULL MUT ANL BLD/T: NOT DETECTED
MLH1+MSH2+MSH6+PMS2 GN DEL+DUP+FUL M: NOT DETECTED

## (undated) DEVICE — ASTOUND STANDARD SURGICAL GOWN, XL: Brand: CONVERTORS

## (undated) DEVICE — DECANTER: Brand: UNBRANDED

## (undated) DEVICE — STAPLER 60 RELOAD BLUE: Brand: SUREFORM

## (undated) DEVICE — BLADELESS OBTURATOR: Brand: WECK VISTA

## (undated) DEVICE — TROCAR: Brand: KII FIOS FIRST ENTRY

## (undated) DEVICE — MONOPOLAR CURVED SCISSORS: Brand: ENDOWRIST

## (undated) DEVICE — TISSUE RETRIEVAL SYSTEM: Brand: INZII RETRIEVAL SYSTEM

## (undated) DEVICE — SCD SEQUENTIAL COMPRESSION COMFORT SLEEVE MEDIUM KNEE LENGTH: Brand: KENDALL SCD

## (undated) DEVICE — VESSEL SEALER EXTEND: Brand: ENDOWRIST

## (undated) DEVICE — DRAPE EQUIPMENT RF WAND

## (undated) DEVICE — CADIERE FORCEPS: Brand: ENDOWRIST

## (undated) DEVICE — TIP COVER ACCESSORY

## (undated) DEVICE — ELECTRO LUBE IS A SINGLE PATIENT USE DEVICE THAT IS INTENDED TO BE USED ON ELECTROSURGICAL ELECTRODES TO REDUCE STICKING.: Brand: KEY SURGICAL ELECTRO LUBE

## (undated) DEVICE — COLUMN DRAPE

## (undated) DEVICE — ENDOPATH PNEUMONEEDLE INSUFFLATION NEEDLES WITH LUER LOCK CONNECTORS 120MM: Brand: ENDOPATH

## (undated) DEVICE — SUT ETHIBOND 0 CT-1 30 IN X424H

## (undated) DEVICE — PLUMEPEN PRO 10FT

## (undated) DEVICE — STRL COTTON TIP APPLCTR 6IN PK: Brand: CARDINAL HEALTH

## (undated) DEVICE — URETERAL CATHETER ADAPTOR TIP

## (undated) DEVICE — PBT NON ABSORBABLE WOUND CLOSURE DEVICE: Brand: V-LOC

## (undated) DEVICE — SUT MONOCRYL 4-0 PS-2 27 IN Y426H

## (undated) DEVICE — TRAVELKIT CONTAINS FIRST STEP KIT (200ML EP-4 KIT) AND SOILED SCOPE BAG - 1 KIT: Brand: TRAVELKIT CONTAINS FIRST STEP KIT AND SOILED SCOPE BAG

## (undated) DEVICE — 3000CC GUARDIAN II: Brand: GUARDIAN

## (undated) DEVICE — ARM DRAPE

## (undated) DEVICE — GLOVE SRG BIOGEL 7.5

## (undated) DEVICE — Device: Brand: DEFENDO AIR/WATER/SUCTION AND BIOPSY VALVE

## (undated) DEVICE — VISIGI 3D®  CALIBRATION SYSTEM  SIZE 36FR BYPASS/SHORT: Brand: BOEHRINGER® VISIGI 3D®  CALIBRATION SYSTEM  SIZE 36FR BYPASS/SHORT

## (undated) DEVICE — ABSORBABLE WOUND CLOSURE DEVICE: Brand: V-LOC 90

## (undated) DEVICE — VIOLET BRAIDED (POLYGLACTIN 910), SYNTHETIC ABSORBABLE SUTURE: Brand: COATED VICRYL

## (undated) DEVICE — WEBRIL 6 IN UNSTERILE

## (undated) DEVICE — 2000CC GUARDIAN II: Brand: GUARDIAN

## (undated) DEVICE — 10 MM BABCOCKS WITH RATCHET HANDLES: Brand: ENDOPATH

## (undated) DEVICE — DISPOSABLE OR TOWEL: Brand: CARDINAL HEALTH

## (undated) DEVICE — STAPLER CANNULA SEAL: Brand: ENDOWRIST

## (undated) DEVICE — MEGA SUTURECUT ND: Brand: ENDOWRIST

## (undated) DEVICE — CANNULA SEAL

## (undated) DEVICE — STAPLER 60: Brand: SUREFORM

## (undated) DEVICE — REDUCER: Brand: ENDOWRIST

## (undated) DEVICE — KIT, ROBOTIC BARIATRIC: Brand: CARDINAL HEALTH

## (undated) DEVICE — STAPLER 60 RELOAD WHITE: Brand: SUREFORM

## (undated) DEVICE — TUBE SET SMOKE EVAC PNEUMOCLEAR HIGH FLOW